# Patient Record
Sex: MALE | Race: WHITE | NOT HISPANIC OR LATINO | Employment: OTHER | URBAN - METROPOLITAN AREA
[De-identification: names, ages, dates, MRNs, and addresses within clinical notes are randomized per-mention and may not be internally consistent; named-entity substitution may affect disease eponyms.]

---

## 2017-03-08 ENCOUNTER — HOSPITAL ENCOUNTER (OUTPATIENT)
Dept: LAB | Facility: HOSPITAL | Age: 72
Setting detail: SPECIMEN
Discharge: HOME | End: 2017-03-08
Attending: INTERNAL MEDICINE | Admitting: INTERNAL MEDICINE

## 2017-09-05 ENCOUNTER — HOSPITAL ENCOUNTER (OUTPATIENT)
Dept: LAB | Facility: HOSPITAL | Age: 72
Setting detail: SPECIMEN
Discharge: HOME | End: 2017-09-05
Attending: INTERNAL MEDICINE | Admitting: INTERNAL MEDICINE

## 2017-12-29 ENCOUNTER — HOSPITAL ENCOUNTER (OUTPATIENT)
Dept: CARDIOLOGY | Facility: HOSPITAL | Age: 72
Discharge: HOME | End: 2017-12-29
Attending: INTERNAL MEDICINE | Admitting: INTERNAL MEDICINE

## 2018-03-08 ENCOUNTER — OFFICE (AMBULATORY)
Dept: URBAN - METROPOLITAN AREA CLINIC 64 | Facility: CLINIC | Age: 73
End: 2018-03-08

## 2018-03-08 VITALS
HEIGHT: 70 IN | HEART RATE: 60 BPM | DIASTOLIC BLOOD PRESSURE: 88 MMHG | SYSTOLIC BLOOD PRESSURE: 207 MMHG | WEIGHT: 210 LBS

## 2018-03-08 DIAGNOSIS — Z86.010 PERSONAL HISTORY OF COLONIC POLYPS: ICD-10-CM

## 2018-03-08 PROCEDURE — 99202 OFFICE O/P NEW SF 15 MIN: CPT | Performed by: INTERNAL MEDICINE

## 2018-04-11 ENCOUNTER — OFFICE (AMBULATORY)
Dept: URBAN - METROPOLITAN AREA PATHOLOGY 4 | Facility: PATHOLOGY | Age: 73
End: 2018-04-11
Payer: COMMERCIAL

## 2018-04-11 ENCOUNTER — ON CAMPUS - OUTPATIENT (AMBULATORY)
Dept: URBAN - METROPOLITAN AREA HOSPITAL 2 | Facility: HOSPITAL | Age: 73
End: 2018-04-11

## 2018-04-11 ENCOUNTER — HOSPITAL ENCOUNTER (OUTPATIENT)
Dept: OTHER | Facility: HOSPITAL | Age: 73
Setting detail: SPECIMEN
Discharge: HOME | End: 2018-04-11
Attending: INTERNAL MEDICINE | Admitting: INTERNAL MEDICINE

## 2018-04-11 VITALS
SYSTOLIC BLOOD PRESSURE: 93 MMHG | WEIGHT: 204 LBS | OXYGEN SATURATION: 99 % | OXYGEN SATURATION: 100 % | HEART RATE: 69 BPM | HEART RATE: 66 BPM | DIASTOLIC BLOOD PRESSURE: 64 MMHG | RESPIRATION RATE: 12 BRPM | SYSTOLIC BLOOD PRESSURE: 106 MMHG | DIASTOLIC BLOOD PRESSURE: 71 MMHG | OXYGEN SATURATION: 94 % | DIASTOLIC BLOOD PRESSURE: 73 MMHG | RESPIRATION RATE: 20 BRPM | RESPIRATION RATE: 16 BRPM | DIASTOLIC BLOOD PRESSURE: 42 MMHG | HEIGHT: 69 IN | DIASTOLIC BLOOD PRESSURE: 56 MMHG | HEART RATE: 62 BPM | HEART RATE: 72 BPM | SYSTOLIC BLOOD PRESSURE: 108 MMHG | TEMPERATURE: 98 F | DIASTOLIC BLOOD PRESSURE: 48 MMHG | SYSTOLIC BLOOD PRESSURE: 131 MMHG | RESPIRATION RATE: 18 BRPM | HEART RATE: 61 BPM | DIASTOLIC BLOOD PRESSURE: 61 MMHG | OXYGEN SATURATION: 97 % | SYSTOLIC BLOOD PRESSURE: 155 MMHG | RESPIRATION RATE: 15 BRPM | OXYGEN SATURATION: 98 % | SYSTOLIC BLOOD PRESSURE: 154 MMHG | SYSTOLIC BLOOD PRESSURE: 120 MMHG

## 2018-04-11 DIAGNOSIS — K57.30 DIVERTICULOSIS OF LARGE INTESTINE WITHOUT PERFORATION OR ABS: ICD-10-CM

## 2018-04-11 DIAGNOSIS — D12.2 BENIGN NEOPLASM OF ASCENDING COLON: ICD-10-CM

## 2018-04-11 DIAGNOSIS — D12.3 BENIGN NEOPLASM OF TRANSVERSE COLON: ICD-10-CM

## 2018-04-11 DIAGNOSIS — K64.0 FIRST DEGREE HEMORRHOIDS: ICD-10-CM

## 2018-04-11 DIAGNOSIS — Z86.010 PERSONAL HISTORY OF COLONIC POLYPS: ICD-10-CM

## 2018-04-11 LAB
GI HISTOLOGY: A. UNSPECIFIED: (no result)
GI HISTOLOGY: PDF REPORT: (no result)

## 2018-04-11 PROCEDURE — 88305 TISSUE EXAM BY PATHOLOGIST: CPT | Mod: 26 | Performed by: INTERNAL MEDICINE

## 2018-04-11 PROCEDURE — 45385 COLONOSCOPY W/LESION REMOVAL: CPT | Mod: PT | Performed by: INTERNAL MEDICINE

## 2018-04-11 RX ADMIN — PROPOFOL: 10 INJECTION, EMULSION INTRAVENOUS at 10:25

## 2019-01-01 ENCOUNTER — OFFICE VISIT (OUTPATIENT)
Dept: CARDIOLOGY | Facility: CLINIC | Age: 74
End: 2019-01-01

## 2019-01-01 ENCOUNTER — HOSPITAL ENCOUNTER (OUTPATIENT)
Dept: OTHER | Facility: HOSPITAL | Age: 74
Discharge: HOME | End: 2019-08-05

## 2019-01-01 ENCOUNTER — HOSPITAL ENCOUNTER (OUTPATIENT)
Dept: INTERVENTIONAL RADIOLOGY/VASCULAR | Facility: HOSPITAL | Age: 74
Discharge: HOME | End: 2019-08-21
Admitting: RADIOLOGY

## 2019-01-01 ENCOUNTER — TRANSCRIBE ORDERS (OUTPATIENT)
Dept: ADMINISTRATIVE | Facility: HOSPITAL | Age: 74
End: 2019-01-01

## 2019-01-01 VITALS
SYSTOLIC BLOOD PRESSURE: 170 MMHG | HEIGHT: 69 IN | DIASTOLIC BLOOD PRESSURE: 70 MMHG | BODY MASS INDEX: 31.4 KG/M2 | OXYGEN SATURATION: 97 % | HEART RATE: 62 BPM | WEIGHT: 212 LBS

## 2019-01-01 VITALS
BODY MASS INDEX: 30.27 KG/M2 | DIASTOLIC BLOOD PRESSURE: 64 MMHG | WEIGHT: 204.37 LBS | HEIGHT: 69 IN | HEART RATE: 63 BPM | OXYGEN SATURATION: 96 % | SYSTOLIC BLOOD PRESSURE: 163 MMHG | RESPIRATION RATE: 13 BRPM | TEMPERATURE: 98.2 F

## 2019-01-01 DIAGNOSIS — I73.9 PVD (PERIPHERAL VASCULAR DISEASE) WITH CLAUDICATION (HCC): Primary | ICD-10-CM

## 2019-01-01 DIAGNOSIS — I35.0 NONRHEUMATIC AORTIC VALVE STENOSIS: ICD-10-CM

## 2019-01-01 DIAGNOSIS — Z00.6 EXAMINATION FOR NORMAL COMPARISON OR CONTROL IN CLINICAL RESEARCH: ICD-10-CM

## 2019-01-01 DIAGNOSIS — E78.5 DYSLIPIDEMIA: ICD-10-CM

## 2019-01-01 DIAGNOSIS — I70.213 ATHEROSCLER OF NATIVE ARTERY OF BOTH LEGS WITH INTERMIT CLAUDICATION (HCC): ICD-10-CM

## 2019-01-01 DIAGNOSIS — I70.212 ATHEROSCLEROSIS OF NATIVE ARTERY OF LEFT LOWER EXTREMITY WITH INTERMITTENT CLAUDICATION (HCC): ICD-10-CM

## 2019-01-01 DIAGNOSIS — Z95.1 HX OF CABG: Primary | ICD-10-CM

## 2019-01-01 DIAGNOSIS — I10 ESSENTIAL HYPERTENSION: ICD-10-CM

## 2019-01-01 LAB
ANION GAP SERPL CALCULATED.3IONS-SCNC: 18.1 MMOL/L (ref 5–15)
APTT PPP: 25.2 SECONDS (ref 24–31)
BASOPHILS # BLD AUTO: 0 10*3/MM3 (ref 0–0.2)
BASOPHILS NFR BLD AUTO: 0.4 % (ref 0–1.5)
BUN BLD-MCNC: 12 MG/DL (ref 8–20)
BUN/CREAT SERPL: 10 (ref 6.2–20.3)
CALCIUM SPEC-SCNC: 9.1 MG/DL (ref 8.9–10.3)
CHLORIDE SERPL-SCNC: 107 MMOL/L (ref 101–111)
CO2 SERPL-SCNC: 19 MMOL/L (ref 22–32)
CREAT BLD-MCNC: 1.2 MG/DL (ref 0.7–1.2)
DEPRECATED RDW RBC AUTO: 45.1 FL (ref 37–54)
DEPRECATED RDW RBC AUTO: 45.9 FL (ref 37–54)
EOSINOPHIL # BLD AUTO: 0.1 10*3/MM3 (ref 0–0.4)
EOSINOPHIL NFR BLD AUTO: 1.2 % (ref 0.3–6.2)
ERYTHROCYTE [DISTWIDTH] IN BLOOD BY AUTOMATED COUNT: 15.5 % (ref 12.3–15.4)
ERYTHROCYTE [DISTWIDTH] IN BLOOD BY AUTOMATED COUNT: 15.8 % (ref 12.3–15.4)
GFR SERPL CREATININE-BSD FRML MDRD: 60 ML/MIN/1.73
GLUCOSE BLD-MCNC: 202 MG/DL (ref 65–99)
HCT VFR BLD AUTO: 37.2 % (ref 37.5–51)
HCT VFR BLD AUTO: 37.3 % (ref 37.5–51)
HGB BLD-MCNC: 12.1 G/DL (ref 13–17.7)
HGB BLD-MCNC: 12.1 G/DL (ref 13–17.7)
INR PPP: 1.04 (ref 0.9–1.1)
LYMPHOCYTES # BLD AUTO: 2 10*3/MM3 (ref 0.7–3.1)
LYMPHOCYTES NFR BLD AUTO: 24.1 % (ref 19.6–45.3)
MCH RBC QN AUTO: 26.7 PG (ref 26.6–33)
MCH RBC QN AUTO: 26.9 PG (ref 26.6–33)
MCHC RBC AUTO-ENTMCNC: 32.5 G/DL (ref 31.5–35.7)
MCHC RBC AUTO-ENTMCNC: 32.6 G/DL (ref 31.5–35.7)
MCV RBC AUTO: 82.2 FL (ref 79–97)
MCV RBC AUTO: 82.6 FL (ref 79–97)
MONOCYTES # BLD AUTO: 0.8 10*3/MM3 (ref 0.1–0.9)
MONOCYTES NFR BLD AUTO: 9.4 % (ref 5–12)
NEUTROPHILS # BLD AUTO: 5.4 10*3/MM3 (ref 1.7–7)
NEUTROPHILS NFR BLD AUTO: 64.9 % (ref 42.7–76)
NRBC BLD AUTO-RTO: 0.1 /100 WBC (ref 0–0.2)
PLATELET # BLD AUTO: 270 10*3/MM3 (ref 140–450)
PLATELET # BLD AUTO: 282 10*3/MM3 (ref 140–450)
PMV BLD AUTO: 8.4 FL (ref 6–12)
PMV BLD AUTO: 9.1 FL (ref 6–12)
POTASSIUM BLD-SCNC: 4.1 MMOL/L (ref 3.6–5.1)
PROTHROMBIN TIME: 10.6 SECONDS (ref 9.6–11.7)
RBC # BLD AUTO: 4.5 10*6/MM3 (ref 4.14–5.8)
RBC # BLD AUTO: 4.54 10*6/MM3 (ref 4.14–5.8)
SODIUM BLD-SCNC: 140 MMOL/L (ref 136–144)
WBC NRBC COR # BLD: 8.3 10*3/MM3 (ref 3.4–10.8)
WBC NRBC COR # BLD: 8.4 10*3/MM3 (ref 3.4–10.8)

## 2019-01-01 PROCEDURE — 25010000002 ONDANSETRON PER 1 MG: Performed by: RADIOLOGY

## 2019-01-01 PROCEDURE — C1725 CATH, TRANSLUMIN NON-LASER: HCPCS

## 2019-01-01 PROCEDURE — C1894 INTRO/SHEATH, NON-LASER: HCPCS

## 2019-01-01 PROCEDURE — C1769 GUIDE WIRE: HCPCS

## 2019-01-01 PROCEDURE — 75630 X-RAY AORTA LEG ARTERIES: CPT

## 2019-01-01 PROCEDURE — 25010000002 HEPARIN (PORCINE) PER 1000 UNITS: Performed by: RADIOLOGY

## 2019-01-01 PROCEDURE — 85025 COMPLETE CBC W/AUTO DIFF WBC: CPT | Performed by: RADIOLOGY

## 2019-01-01 PROCEDURE — 80048 BASIC METABOLIC PNL TOTAL CA: CPT | Performed by: RADIOLOGY

## 2019-01-01 PROCEDURE — 85730 THROMBOPLASTIN TIME PARTIAL: CPT | Performed by: RADIOLOGY

## 2019-01-01 PROCEDURE — 25010000002 IOPAMIDOL 61 % SOLUTION: Performed by: THORACIC SURGERY (CARDIOTHORACIC VASCULAR SURGERY)

## 2019-01-01 PROCEDURE — C1760 CLOSURE DEV, VASC: HCPCS

## 2019-01-01 PROCEDURE — 25010000002 MIDAZOLAM PER 1 MG: Performed by: RADIOLOGY

## 2019-01-01 PROCEDURE — 99153 MOD SED SAME PHYS/QHP EA: CPT

## 2019-01-01 PROCEDURE — 93000 ELECTROCARDIOGRAM COMPLETE: CPT | Performed by: INTERNAL MEDICINE

## 2019-01-01 PROCEDURE — 99152 MOD SED SAME PHYS/QHP 5/>YRS: CPT

## 2019-01-01 PROCEDURE — C1876 STENT, NON-COA/NON-COV W/DEL: HCPCS

## 2019-01-01 PROCEDURE — 25010000002 FENTANYL CITRATE (PF) 100 MCG/2ML SOLUTION: Performed by: RADIOLOGY

## 2019-01-01 PROCEDURE — 85610 PROTHROMBIN TIME: CPT | Performed by: RADIOLOGY

## 2019-01-01 PROCEDURE — C1887 CATHETER, GUIDING: HCPCS

## 2019-01-01 PROCEDURE — 85027 COMPLETE CBC AUTOMATED: CPT | Performed by: RADIOLOGY

## 2019-01-01 PROCEDURE — 99214 OFFICE O/P EST MOD 30 MIN: CPT | Performed by: INTERNAL MEDICINE

## 2019-01-01 RX ORDER — METFORMIN HYDROCHLORIDE 500 MG/1
TABLET, EXTENDED RELEASE ORAL
Qty: 360 TABLET | Refills: 0 | OUTPATIENT
Start: 2019-01-01

## 2019-01-01 RX ORDER — AMLODIPINE BESYLATE 5 MG/1
TABLET ORAL EVERY 24 HOURS
COMMUNITY
Start: 2018-06-27 | End: 2019-01-01

## 2019-01-01 RX ORDER — HEPARIN SODIUM 1000 [USP'U]/ML
INJECTION, SOLUTION INTRAVENOUS; SUBCUTANEOUS
Status: COMPLETED | OUTPATIENT
Start: 2019-01-01 | End: 2019-01-01

## 2019-01-01 RX ORDER — NIACIN 500 MG
TABLET ORAL EVERY 24 HOURS
COMMUNITY
Start: 2015-08-24 | End: 2020-01-01 | Stop reason: SDUPTHER

## 2019-01-01 RX ORDER — LOSARTAN POTASSIUM 100 MG/1
100 TABLET ORAL DAILY
COMMUNITY
Start: 2015-08-24

## 2019-01-01 RX ORDER — ATORVASTATIN CALCIUM 40 MG/1
TABLET, FILM COATED ORAL
Qty: 90 TABLET | OUTPATIENT
Start: 2019-01-01

## 2019-01-01 RX ORDER — OFLOXACIN 3 MG/ML
SOLUTION/ DROPS OPHTHALMIC
Refills: 0 | COMMUNITY
Start: 2019-01-01 | End: 2020-01-01

## 2019-01-01 RX ORDER — FINASTERIDE 5 MG/1
5 TABLET, FILM COATED ORAL NIGHTLY
COMMUNITY
Start: 2015-08-24

## 2019-01-01 RX ORDER — ASPIRIN 81 MG/1
81 TABLET ORAL 2 TIMES DAILY
COMMUNITY
Start: 2015-08-24 | End: 2020-01-01 | Stop reason: HOSPADM

## 2019-01-01 RX ORDER — NITROGLYCERIN 0.4 MG/1
TABLET SUBLINGUAL
Qty: 25 TABLET | Refills: 0 | Status: SHIPPED | OUTPATIENT
Start: 2019-01-01 | End: 2019-01-01 | Stop reason: SDUPTHER

## 2019-01-01 RX ORDER — GLIMEPIRIDE 2 MG/1
TABLET ORAL
Qty: 360 TABLET | Refills: 0 | OUTPATIENT
Start: 2019-01-01

## 2019-01-01 RX ORDER — ESOMEPRAZOLE MAGNESIUM 40 MG/1
CAPSULE, DELAYED RELEASE ORAL
COMMUNITY
Start: 2015-08-24 | End: 2020-01-01 | Stop reason: ALTCHOICE

## 2019-01-01 RX ORDER — SODIUM CHLORIDE 0.9 % (FLUSH) 0.9 %
3-10 SYRINGE (ML) INJECTION AS NEEDED
Status: DISCONTINUED | OUTPATIENT
Start: 2019-01-01 | End: 2019-01-01 | Stop reason: HOSPADM

## 2019-01-01 RX ORDER — ATENOLOL 25 MG/1
TABLET ORAL
Qty: 180 TABLET | Refills: 1 | Status: SHIPPED | OUTPATIENT
Start: 2019-01-01 | End: 2020-01-01

## 2019-01-01 RX ORDER — MIDAZOLAM HYDROCHLORIDE 1 MG/ML
INJECTION INTRAMUSCULAR; INTRAVENOUS
Status: COMPLETED | OUTPATIENT
Start: 2019-01-01 | End: 2019-01-01

## 2019-01-01 RX ORDER — GLIMEPIRIDE 2 MG/1
4 TABLET ORAL 2 TIMES DAILY
COMMUNITY
Start: 2018-02-13

## 2019-01-01 RX ORDER — CLOPIDOGREL BISULFATE 75 MG/1
TABLET ORAL EVERY 24 HOURS
COMMUNITY
Start: 2018-03-01 | End: 2019-01-01 | Stop reason: SDUPTHER

## 2019-01-01 RX ORDER — ATORVASTATIN CALCIUM 40 MG/1
TABLET, FILM COATED ORAL
Qty: 30 TABLET | Refills: 0 | Status: SHIPPED | OUTPATIENT
Start: 2019-01-01 | End: 2020-01-01 | Stop reason: ALTCHOICE

## 2019-01-01 RX ORDER — METFORMIN HYDROCHLORIDE 500 MG/1
2000 TABLET, EXTENDED RELEASE ORAL
COMMUNITY
Start: 2018-09-13

## 2019-01-01 RX ORDER — CLOPIDOGREL BISULFATE 75 MG/1
TABLET ORAL
Qty: 90 TABLET | Refills: 1 | Status: SHIPPED | OUTPATIENT
Start: 2019-01-01 | End: 2020-01-01

## 2019-01-01 RX ORDER — MINOXIDIL 2 %
1 SOLUTION, NON-ORAL TOPICAL 2 TIMES DAILY
COMMUNITY
Start: 2015-08-24 | End: 2020-01-01 | Stop reason: HOSPADM

## 2019-01-01 RX ORDER — NITROGLYCERIN 0.4 MG/1
TABLET SUBLINGUAL
COMMUNITY
Start: 2018-07-02 | End: 2019-01-01 | Stop reason: SDUPTHER

## 2019-01-01 RX ORDER — AMLODIPINE BESYLATE 10 MG/1
10 TABLET ORAL DAILY
Refills: 3 | COMMUNITY
Start: 2019-05-11

## 2019-01-01 RX ORDER — ISOSORBIDE MONONITRATE 60 MG/1
TABLET, EXTENDED RELEASE ORAL EVERY 24 HOURS
COMMUNITY
Start: 2017-09-28 | End: 2019-01-01

## 2019-01-01 RX ORDER — ONDANSETRON 2 MG/ML
INJECTION INTRAMUSCULAR; INTRAVENOUS
Status: COMPLETED | OUTPATIENT
Start: 2019-01-01 | End: 2019-01-01

## 2019-01-01 RX ORDER — SODIUM CHLORIDE 0.9 % (FLUSH) 0.9 %
3 SYRINGE (ML) INJECTION EVERY 12 HOURS SCHEDULED
Status: DISCONTINUED | OUTPATIENT
Start: 2019-01-01 | End: 2019-01-01 | Stop reason: HOSPADM

## 2019-01-01 RX ORDER — ISOSORBIDE MONONITRATE 60 MG/1
TABLET, EXTENDED RELEASE ORAL
Qty: 90 TABLET | Refills: 0 | Status: SHIPPED | OUTPATIENT
Start: 2019-01-01 | End: 2020-01-01

## 2019-01-01 RX ORDER — ATORVASTATIN CALCIUM 40 MG/1
TABLET, FILM COATED ORAL
COMMUNITY
Start: 2018-07-09 | End: 2019-01-01

## 2019-01-01 RX ORDER — FENTANYL CITRATE 50 UG/ML
INJECTION, SOLUTION INTRAMUSCULAR; INTRAVENOUS
Status: COMPLETED | OUTPATIENT
Start: 2019-01-01 | End: 2019-01-01

## 2019-01-01 RX ORDER — ATENOLOL 25 MG/1
TABLET ORAL EVERY 12 HOURS
COMMUNITY
Start: 2017-11-16 | End: 2019-01-01 | Stop reason: SDUPTHER

## 2019-01-01 RX ORDER — NITROGLYCERIN 0.4 MG/1
TABLET SUBLINGUAL
Qty: 25 TABLET | Refills: 0 | Status: SHIPPED | OUTPATIENT
Start: 2019-01-01 | End: 2020-01-01

## 2019-01-01 RX ADMIN — IOPAMIDOL 11 ML: 612 INJECTION, SOLUTION INTRAVENOUS at 11:22

## 2019-01-01 RX ADMIN — HEPARIN SODIUM 5000 UNITS: 1000 INJECTION, SOLUTION INTRAVENOUS; SUBCUTANEOUS at 09:38

## 2019-01-01 RX ADMIN — FENTANYL CITRATE 100 MCG: 50 INJECTION, SOLUTION INTRAMUSCULAR; INTRAVENOUS at 09:15

## 2019-01-01 RX ADMIN — ONDANSETRON 4 MG: 2 INJECTION INTRAMUSCULAR; INTRAVENOUS at 08:55

## 2019-01-01 RX ADMIN — FENTANYL CITRATE 50 MCG: 50 INJECTION, SOLUTION INTRAMUSCULAR; INTRAVENOUS at 09:27

## 2019-01-01 RX ADMIN — MIDAZOLAM 1 MG: 1 INJECTION INTRAMUSCULAR; INTRAVENOUS at 09:15

## 2019-01-01 RX ADMIN — CEFAZOLIN SODIUM 2 G: 1 INJECTION, POWDER, FOR SOLUTION INTRAMUSCULAR; INTRAVENOUS at 09:01

## 2019-01-01 RX ADMIN — IOPAMIDOL 100 ML: 612 INJECTION, SOLUTION INTRAVENOUS at 11:22

## 2019-01-01 RX ADMIN — MIDAZOLAM 0.5 MG: 1 INJECTION INTRAMUSCULAR; INTRAVENOUS at 09:27

## 2019-06-21 PROBLEM — R01.1 SYSTOLIC MURMUR: Status: ACTIVE | Noted: 2017-06-26

## 2019-06-21 PROBLEM — E78.5 HYPERLIPIDEMIA: Status: ACTIVE | Noted: 2019-01-01

## 2019-06-21 PROBLEM — I21.3 ST ELEVATION (STEMI) MYOCARDIAL INFARCTION (HCC): Status: ACTIVE | Noted: 2019-01-01

## 2019-06-21 PROBLEM — I25.10 CORONARY ARTERY DISEASE: Status: ACTIVE | Noted: 2019-01-01

## 2019-07-01 NOTE — PROGRESS NOTES
Encounter Date:07/01/2019  6 months follow-up    Patient ID: Clemente Salinas is a 73 y.o. male.    Chief Complaint:  Status post CABG  Systolic murmur.  Status post stent placement  Hypertension  Diabetes  Dyslipidemia      History of Present Illness  Patient is having tightness with walking concerning for intermittent claudication.    Since I have last seen, the patient has been without any chest discomfort ,shortness of breath, palpitations, dizziness or syncope.  Denies having any headache ,abdominal pain ,nausea, vomiting , diarrhea constipation, loss of weight or loss of appetite.  Denies having any excessive bruising ,hematuria or blood in the stool.    Review of all systems negative except as indicated    Assessment and Plan       [[[[[[[[[[[[[[[[[  Impression  =============   - status post CABG 07/2001.    Status post stent to SVG to lateral marginal branch and LAD 09/09/2013.  Status post stent to SVG to RCA 06/18/2013 after subendocardial myocardial infarction. lima to LAD was patent.  SVG to 2nd marginal branch and RCA were patent.  Normal left   ventricular function.     -  exertional chest discomfort -improved.    Negative stress Cardiolite test 07/05/2016.     -systolic murmur -echocardiogram will be reviewed.  Patient has gradient of 17 mm of mercury across aortic valve.  12/29/2017    Possible left lower extremity claudication-intermittent       -diabetes dyslipidemia and hypertension -improved      - status post cholecystectomy      -past history of smoking      -history of BPH      -family history of coronary artery disease.  ============    Plan  ============  EKG showed sinus bradycardia nonspecific ST-T wave changes 54/min  Patient is having left lower extremity claudication.  Patient has an appointment with vascular surgeon.   patient is not having any angina pectoris or congestive heart failure  Medications were reviewed and updated.  Followup in 6 months  ]]]]]]]]]]]]]]]]]]]]]]]]             Diagnosis Plan   1. Hx of CABG     2. Dyslipidemia     3. Essential hypertension     4. Atherosclerosis of native artery of left lower extremity with intermittent claudication (CMS/HCC)     5. Nonrheumatic aortic valve stenosis     LAB RESULTS (LAST 7 DAYS)    CBC        BMP        CMP         BNP        TROPONIN        CoAg        Creatinine Clearance  CrCl cannot be calculated (No order found.).    ABG        Radiology  No radiology results for the last day                The following portions of the patient's history were reviewed and updated as appropriate: allergies, current medications, past family history, past medical history, past social history, past surgical history and problem list.    Review of Systems   Constitution: Negative for weight gain and weight loss.   Cardiovascular: Negative for chest pain, leg swelling, palpitations and syncope.   Respiratory: Negative for shortness of breath.    Skin: Negative for rash.   Gastrointestinal: Negative for nausea and vomiting.   Neurological: Negative for dizziness, light-headedness and numbness.         Current Outpatient Medications:   •  amLODIPine (NORVASC) 10 MG tablet, Take  by mouth Daily., Disp: , Rfl: 3  •  aspirin (ADULT ASPIRIN EC LOW STRENGTH) 81 MG EC tablet, ADULT ASPIRIN EC LOW STRENGTH 81 MG ORAL TABLET DELAYED RELEASE, Disp: , Rfl:   •  atenolol (TENORMIN) 25 MG tablet, Every 12 (Twelve) Hours., Disp: , Rfl:   •  atorvastatin (LIPITOR) 40 MG tablet, ATORVASTATIN CALCIUM 40 MG TABS, Disp: , Rfl:   •  Cholecalciferol ([employer]S VITAMIN D3) 1000 units chewable tablet, Daily., Disp: , Rfl:   •  clopidogrel (PLAVIX) 75 MG tablet, Daily., Disp: , Rfl:   •  esomeprazole (NEXIUM) 40 MG capsule, NEXIUM 40 MG CPDR, Disp: , Rfl:   •  finasteride (PROSCAR) 5 MG tablet, FINASTERIDE 5 MG TABS, Disp: , Rfl:   •  glimepiride (AMARYL) 2 MG tablet, GLIMEPIRIDE 2 MG TABS, Disp: , Rfl:   •  glucose blood (ONETOUCH VERIO) test strip, ONETOUCH VERIO STRP, Disp: , Rfl:    •  isosorbide mononitrate (IMDUR) 60 MG 24 hr tablet, Daily., Disp: , Rfl:   •  losartan (COZAAR) 100 MG tablet, LOSARTAN POTASSIUM 50 MG TABS, Disp: , Rfl:   •  metFORMIN ER (GLUCOPHAGE-XR) 500 MG 24 hr tablet, 4 (Four) Times a Day., Disp: , Rfl:   •  niacin 500 MG tablet, Daily., Disp: , Rfl:   •  nitroglycerin (NITROSTAT) 0.4 MG SL tablet, NITROSTAT 0.4 MG SUBL, Disp: , Rfl:   •  ofloxacin (OCUFLOX) 0.3 % ophthalmic solution, , Disp: , Rfl: 0  •  Omega-3 Fatty Acids (CVS FISH OIL) 1200 MG capsule, CVS FISH OIL 1200 MG CAPS, Disp: , Rfl:     No Known Allergies    Family History   Problem Relation Age of Onset   • Heart disease Father    • Heart attack Father 62   • Heart attack Paternal Grandmother 72   • Heart disease Paternal Grandmother        Past Surgical History:   Procedure Laterality Date   • CHOLECYSTECTOMY     • CORONARY ANGIOPLASTY WITH STENT PLACEMENT  09/09/2013    LAD   • CORONARY ARTERY BYPASS GRAFT  07/2001   • CORONARY STENT PLACEMENT  06/18/2013       Past Medical History:   Diagnosis Date   • BPH (benign prostatic hyperplasia)    • CAD (coronary artery disease)    • DM type 2 (diabetes mellitus, type 2) (CMS/[facility]) 2001   • HLD (hyperlipidemia)    • HTN (hypertension) 2003   • Myocardial infarction (CMS/[facility])    • Peripheral neuropathy    • Vitamin D deficiency 12/2010       Family History   Problem Relation Age of Onset   • Heart disease Father    • Heart attack Father 62   • Heart attack Paternal Grandmother 72   • Heart disease Paternal Grandmother        Social History     Socioeconomic History   • Marital status:      Spouse name: Not on file   • Number of children: Not on file   • Years of education: Not on file   • Highest education level: Not on file   Tobacco Use   • Smoking status: Former Smoker   Substance and Sexual Activity   • Alcohol use: No   • Drug use: No           ECG 12 Lead  Date/Time: 7/1/2019 10:45 AM  Performed by: Pj Hough MD  Authorized by: [place]  "MD Pj   Previous ECG: no previous ECG available  Comments: Sinus bradycardia 54/min nonspecific ST-T wave changes normal axis normal intervals no ectopy              Objective:       Physical Exam    /70 (BP Location: Left arm, Patient Position: Sitting, Cuff Size: Large Adult)   Pulse 62   Ht 175.3 cm (69\")   Wt 96.2 kg (212 lb)   SpO2 97%   BMI 31.31 kg/m²   The patient is alert, oriented and in no distress.    Vital signs as noted above.    Head and neck revealed no carotid bruits or jugular venous distension.  No thyromegaly or lymphadenopathy is present.    Lungs clear.  No wheezing.  Breath sounds are normal bilaterally.    Heart normal first and second heart sounds.  Grade 2/6 systolic murmur.  No pericardial rub is present.  No gallop is present.    Abdomen soft and nontender.  No organomegaly is present.    Extremities revealed decreased lower extremity pulses.  No pedal edema.  Skin warm and dry.    Musculoskeletal system is grossly normal.    CNS grossly normal.        "

## 2019-08-21 PROBLEM — I70.213 ATHEROSCLER OF NATIVE ARTERY OF BOTH LEGS WITH INTERMIT CLAUDICATION (HCC): Status: ACTIVE | Noted: 2019-01-01

## 2019-08-21 NOTE — NURSING NOTE
Dr. Patterson deployed stent in left common iliac artery. Angioplasty within stent taking place. TrialBee stent 10mm x 37mm x 75cm, Lot # [id number].

## 2019-08-21 NOTE — NURSING NOTE
Dr. Patterson is performing angioplasty using a [id number] balloon, in patient's right superficial femoral artery.

## 2019-08-21 NOTE — DISCHARGE INSTRUCTIONS
A responsible adult should stay with you and you should rest quietly for the rest of the day. Do not drink alcohol, drive or cook for 24 hours following your procedure.  Progress your diet as tolerated.  Resume your usually medications including aspirin.  When you remove your dressing in 48 hours, a small amount of blood is to be expected. Do not be alarmed.  If your legs become painful, numb, swollen or is bleeding excessively apply pressure and proceed to the Emergency room. Have someone wake you up during the first night home to check on you. Do not shower, bath, or get your dressing wet at all for 48 hours.  You may shower after the dressing is removed. No lifting more that 10 pounds for 48 hours.  If severe pain, increased shortness of air or racing heartbeat occur, seek immediate medical attention.  Follow up with Dr. Blandon with further questions.

## 2019-08-21 NOTE — NURSING NOTE
Local dressing of 4x4, betadine, and tegaderm applied to RCFA. Hemostasis achieved to LCFA. Small hematoma noted to LCFA, but area is soft per S. RT StanfordR. Local pressure dressing of 4x4, betadine, and tegaderm applied to LCFA.

## 2019-08-21 NOTE — NURSING NOTE
Dr. Patterson deployed stent in pt's right superficial femoral artery and angioplasty within stent is taking place. [facility] 6mm x 37mm x 75cm, lot # [id number].

## 2019-08-21 NOTE — NURSING NOTE
Pt transferred back to post-op via stretcher and on room air. Pt. Is awake but drowsy and oriented x3. Pt. Has dressings dry and intact to right and left groins. Pt. Placed back on 2L oxygen via N/C in post-op due to dropping to 88% at times being drowsy.

## 2019-08-21 NOTE — NURSING NOTE
Pt. Brought into IR procedure room and moved self from stretcher to IR exam table in supine position. Pt. Provided warm blankets for comfort. Pt. Remains on heart, blood pressure, and oxygen sat monitoring. Pt. Placed on 2L oxygen via N/C for procedure. Pt denies any pain prior to start of procedure.

## 2019-08-21 NOTE — NURSING NOTE
Pt. Right and left groin prepped in sterile fashion using chlorhexidine scrub and then draped on sterile fashion.

## 2020-01-01 ENCOUNTER — APPOINTMENT (OUTPATIENT)
Dept: MRI IMAGING | Facility: HOSPITAL | Age: 75
End: 2020-01-01

## 2020-01-01 ENCOUNTER — APPOINTMENT (OUTPATIENT)
Dept: GENERAL RADIOLOGY | Facility: HOSPITAL | Age: 75
End: 2020-01-01

## 2020-01-01 ENCOUNTER — HOSPITAL ENCOUNTER (INPATIENT)
Facility: HOSPITAL | Age: 75
LOS: 4 days | Discharge: HOME HEALTH | End: 2020-05-11
Attending: NEUROLOGICAL SURGERY | Admitting: NEUROLOGICAL SURGERY

## 2020-01-01 ENCOUNTER — READMISSION MANAGEMENT (OUTPATIENT)
Dept: CALL CENTER | Facility: HOSPITAL | Age: 75
End: 2020-01-01

## 2020-01-01 ENCOUNTER — ANESTHESIA (OUTPATIENT)
Dept: GASTROENTEROLOGY | Facility: HOSPITAL | Age: 75
End: 2020-01-01

## 2020-01-01 ENCOUNTER — TELEPHONE (OUTPATIENT)
Dept: NEUROSURGERY | Facility: CLINIC | Age: 75
End: 2020-01-01

## 2020-01-01 ENCOUNTER — OFFICE VISIT (OUTPATIENT)
Dept: CARDIOLOGY | Facility: CLINIC | Age: 75
End: 2020-01-01

## 2020-01-01 ENCOUNTER — APPOINTMENT (OUTPATIENT)
Dept: CT IMAGING | Facility: HOSPITAL | Age: 75
End: 2020-01-01

## 2020-01-01 ENCOUNTER — HOSPITAL ENCOUNTER (OUTPATIENT)
Dept: RADIATION ONCOLOGY | Facility: HOSPITAL | Age: 75
Setting detail: RADIATION/ONCOLOGY SERIES
End: 2020-01-01

## 2020-01-01 ENCOUNTER — ANESTHESIA (OUTPATIENT)
Dept: MEDSURG UNIT | Facility: HOSPITAL | Age: 75
End: 2020-01-01

## 2020-01-01 ENCOUNTER — ANESTHESIA EVENT (OUTPATIENT)
Dept: GASTROENTEROLOGY | Facility: HOSPITAL | Age: 75
End: 2020-01-01

## 2020-01-01 ENCOUNTER — OFFICE VISIT (OUTPATIENT)
Dept: NEUROSURGERY | Facility: CLINIC | Age: 75
End: 2020-01-01

## 2020-01-01 ENCOUNTER — ANESTHESIA EVENT (OUTPATIENT)
Dept: MEDSURG UNIT | Facility: HOSPITAL | Age: 75
End: 2020-01-01

## 2020-01-01 ENCOUNTER — HOSPITAL ENCOUNTER (INPATIENT)
Facility: HOSPITAL | Age: 75
LOS: 5 days | Discharge: HOME | End: 2020-05-05
Attending: FAMILY MEDICINE | Admitting: FAMILY MEDICINE

## 2020-01-01 ENCOUNTER — ANESTHESIA EVENT (OUTPATIENT)
Dept: PERIOP | Facility: HOSPITAL | Age: 75
End: 2020-01-01

## 2020-01-01 ENCOUNTER — HOSPITAL ENCOUNTER (EMERGENCY)
Facility: HOSPITAL | Age: 75
End: 2020-05-25
Attending: EMERGENCY MEDICINE | Admitting: EMERGENCY MEDICINE

## 2020-01-01 ENCOUNTER — ANESTHESIA (OUTPATIENT)
Dept: PERIOP | Facility: HOSPITAL | Age: 75
End: 2020-01-01

## 2020-01-01 VITALS
SYSTOLIC BLOOD PRESSURE: 111 MMHG | DIASTOLIC BLOOD PRESSURE: 59 MMHG | OXYGEN SATURATION: 96 % | BODY MASS INDEX: 26.87 KG/M2 | WEIGHT: 181.44 LBS | RESPIRATION RATE: 14 BRPM | HEIGHT: 69 IN | HEART RATE: 70 BPM | TEMPERATURE: 98.3 F

## 2020-01-01 VITALS — BODY MASS INDEX: 26.78 KG/M2 | WEIGHT: 181.44 LBS

## 2020-01-01 VITALS
WEIGHT: 209 LBS | OXYGEN SATURATION: 96 % | SYSTOLIC BLOOD PRESSURE: 170 MMHG | BODY MASS INDEX: 30.96 KG/M2 | DIASTOLIC BLOOD PRESSURE: 68 MMHG | HEIGHT: 69 IN | HEART RATE: 65 BPM

## 2020-01-01 VITALS
BODY MASS INDEX: 28.73 KG/M2 | SYSTOLIC BLOOD PRESSURE: 148 MMHG | HEIGHT: 69 IN | OXYGEN SATURATION: 95 % | WEIGHT: 194 LBS | DIASTOLIC BLOOD PRESSURE: 59 MMHG | HEART RATE: 50 BPM | RESPIRATION RATE: 16 BRPM | TEMPERATURE: 97.4 F

## 2020-01-01 DIAGNOSIS — C79.9 METASTATIC CANCER (HCC): ICD-10-CM

## 2020-01-01 DIAGNOSIS — I46.9 CARDIOPULMONARY ARREST (HCC): Primary | ICD-10-CM

## 2020-01-01 DIAGNOSIS — Z98.1 S/P CERVICAL SPINAL FUSION: Primary | ICD-10-CM

## 2020-01-01 DIAGNOSIS — E78.2 MIXED HYPERLIPIDEMIA: Primary | ICD-10-CM

## 2020-01-01 DIAGNOSIS — I70.212 ATHEROSCLEROSIS OF NATIVE ARTERY OF LEFT LOWER EXTREMITY WITH INTERMITTENT CLAUDICATION (HCC): ICD-10-CM

## 2020-01-01 DIAGNOSIS — Z98.61 STATUS POST PERCUTANEOUS TRANSLUMINAL CORONARY ANGIOPLASTY: ICD-10-CM

## 2020-01-01 DIAGNOSIS — Z98.890 S/P VERTEBROPLASTY: ICD-10-CM

## 2020-01-01 DIAGNOSIS — C34.31 MALIGNANT NEOPLASM OF LOWER LOBE OF RIGHT LUNG (HCC): ICD-10-CM

## 2020-01-01 DIAGNOSIS — Z95.1 STATUS POST CORONARY ARTERY BYPASS GRAFT: ICD-10-CM

## 2020-01-01 DIAGNOSIS — E78.2 MODERATE MIXED HYPERLIPIDEMIA NOT REQUIRING STATIN THERAPY: ICD-10-CM

## 2020-01-01 DIAGNOSIS — E11.49 TYPE 2 DIABETES MELLITUS WITH OTHER DIABETIC NEUROLOGICAL COMPLICATION (HCC): ICD-10-CM

## 2020-01-01 DIAGNOSIS — I10 ESSENTIAL HYPERTENSION: ICD-10-CM

## 2020-01-01 DIAGNOSIS — C34.91 MALIGNANT NEOPLASM OF RIGHT LUNG, UNSPECIFIED PART OF LUNG (HCC): Primary | ICD-10-CM

## 2020-01-01 LAB
ABO GROUP BLD: NORMAL
ALBUMIN SERPL-MCNC: 3.5 G/DL (ref 3.5–5.2)
ALBUMIN SERPL-MCNC: 3.5 G/DL (ref 3.5–5.2)
ALBUMIN SERPL-MCNC: 4.1 G/DL (ref 3.5–5.2)
ALBUMIN/GLOB SERPL: 1.2 G/DL
ALBUMIN/GLOB SERPL: 1.2 G/DL
ALBUMIN/GLOB SERPL: 1.3 G/DL
ALP SERPL-CCNC: 141 U/L (ref 39–117)
ALP SERPL-CCNC: 156 U/L (ref 39–117)
ALP SERPL-CCNC: 187 U/L (ref 39–117)
ALT SERPL W P-5'-P-CCNC: 10 U/L (ref 1–41)
ALT SERPL W P-5'-P-CCNC: 14 U/L (ref 1–41)
ALT SERPL W P-5'-P-CCNC: 15 U/L (ref 1–41)
ANION GAP SERPL CALCULATED.3IONS-SCNC: 11 MMOL/L (ref 5–15)
ANION GAP SERPL CALCULATED.3IONS-SCNC: 13 MMOL/L (ref 5–15)
ANION GAP SERPL CALCULATED.3IONS-SCNC: 14 MMOL/L (ref 5–15)
ANION GAP SERPL CALCULATED.3IONS-SCNC: 14 MMOL/L (ref 5–15)
APTT PPP: 21.3 SECONDS (ref 24–31)
AST SERPL-CCNC: 14 U/L (ref 1–40)
AST SERPL-CCNC: 17 U/L (ref 1–40)
AST SERPL-CCNC: 22 U/L (ref 1–40)
B PERT DNA SPEC QL NAA+PROBE: NOT DETECTED
B PERT DNA SPEC QL NAA+PROBE: NOT DETECTED
BACTERIA SPEC RESP CULT: NO GROWTH
BASE DEFICIT: ABNORMAL
BASE EXCESS BLDA CALC-SCNC: <0 MMOL/L (ref 0–3)
BASOPHILS # BLD AUTO: 0 10*3/MM3 (ref 0–0.2)
BASOPHILS NFR BLD AUTO: 0.1 % (ref 0–1.5)
BASOPHILS NFR BLD AUTO: 0.1 % (ref 0–1.5)
BASOPHILS NFR BLD AUTO: 0.3 % (ref 0–1.5)
BASOPHILS NFR BLD AUTO: 0.3 % (ref 0–1.5)
BH BB BLOOD EXPIRATION DATE: NORMAL
BH BB BLOOD EXPIRATION DATE: NORMAL
BH BB BLOOD TYPE BARCODE: 6200
BH BB BLOOD TYPE BARCODE: 6200
BH BB DISPENSE STATUS: NORMAL
BH BB DISPENSE STATUS: NORMAL
BH BB PRODUCT CODE: NORMAL
BH BB PRODUCT CODE: NORMAL
BH BB UNIT NUMBER: NORMAL
BH BB UNIT NUMBER: NORMAL
BILIRUB SERPL-MCNC: 0.4 MG/DL (ref 0.2–1.2)
BILIRUB SERPL-MCNC: 0.4 MG/DL (ref 0.2–1.2)
BILIRUB SERPL-MCNC: 0.5 MG/DL (ref 0.2–1.2)
BLD GP AB SCN SERPL QL: NEGATIVE
BUN BLD-MCNC: 14 MG/DL (ref 8–23)
BUN BLD-MCNC: 15 MG/DL (ref 8–23)
BUN BLD-MCNC: 18 MG/DL (ref 8–23)
BUN BLD-MCNC: 28 MG/DL (ref 8–23)
BUN/CREAT SERPL: 11.6 (ref 7–25)
BUN/CREAT SERPL: 13 (ref 7–25)
BUN/CREAT SERPL: 16.7 (ref 7–25)
BUN/CREAT SERPL: 24.3 (ref 7–25)
C PNEUM DNA NPH QL NAA+NON-PROBE: NOT DETECTED
C PNEUM DNA NPH QL NAA+NON-PROBE: NOT DETECTED
CA-I BLDA-SCNC: 1.22 MMOL/L (ref 1.12–1.32)
CA-I SERPL ISE-MCNC: 1.36 MMOL/L (ref 1.2–1.3)
CA-I SERPL ISE-MCNC: 1.57 MMOL/L (ref 1.2–1.3)
CALCIUM SPEC-SCNC: 12.6 MG/DL (ref 8.6–10.5)
CALCIUM SPEC-SCNC: 8.1 MG/DL (ref 8.6–10.5)
CALCIUM SPEC-SCNC: 8.6 MG/DL (ref 8.6–10.5)
CALCIUM SPEC-SCNC: 9.9 MG/DL (ref 8.6–10.5)
CHLORIDE SERPL-SCNC: 102 MMOL/L (ref 98–107)
CHLORIDE SERPL-SCNC: 102 MMOL/L (ref 98–107)
CHLORIDE SERPL-SCNC: 93 MMOL/L (ref 98–107)
CHLORIDE SERPL-SCNC: 93 MMOL/L (ref 98–107)
CMV DNA SPEC QL NAA+PROBE: NEGATIVE
CO2 BLDA-SCNC: 25 MMOL/L (ref 23–27)
CO2 SERPL-SCNC: 22 MMOL/L (ref 22–29)
CO2 SERPL-SCNC: 23 MMOL/L (ref 22–29)
CO2 SERPL-SCNC: 23 MMOL/L (ref 22–29)
CO2 SERPL-SCNC: 25 MMOL/L (ref 22–29)
CREAT BLD-MCNC: 1.03 MG/DL (ref 0.76–1.27)
CREAT BLD-MCNC: 1.08 MG/DL (ref 0.76–1.27)
CREAT BLD-MCNC: 1.15 MG/DL (ref 0.76–1.27)
CREAT BLD-MCNC: 1.15 MG/DL (ref 0.76–1.27)
CREAT BLD-MCNC: 1.21 MG/DL (ref 0.76–1.27)
CROSSMATCH INTERPRETATION: NORMAL
CROSSMATCH INTERPRETATION: NORMAL
D DIMER PPP FEU-MCNC: 3.77 MCGFEU/ML (ref 0.17–0.59)
DEPRECATED RDW RBC AUTO: 44.6 FL (ref 37–54)
DEPRECATED RDW RBC AUTO: 47.3 FL (ref 37–54)
DEPRECATED RDW RBC AUTO: 47.7 FL (ref 37–54)
DEPRECATED RDW RBC AUTO: 49.4 FL (ref 37–54)
EOSINOPHIL # BLD AUTO: 0 10*3/MM3 (ref 0–0.4)
EOSINOPHIL # BLD AUTO: 0 10*3/MM3 (ref 0–0.4)
EOSINOPHIL # BLD AUTO: 0.1 10*3/MM3 (ref 0–0.4)
EOSINOPHIL # BLD AUTO: 0.1 10*3/MM3 (ref 0–0.4)
EOSINOPHIL NFR BLD AUTO: 0 % (ref 0.3–6.2)
EOSINOPHIL NFR BLD AUTO: 0.2 % (ref 0.3–6.2)
EOSINOPHIL NFR BLD AUTO: 0.6 % (ref 0.3–6.2)
EOSINOPHIL NFR BLD AUTO: 0.8 % (ref 0.3–6.2)
ERYTHROCYTE [DISTWIDTH] IN BLOOD BY AUTOMATED COUNT: 16.2 % (ref 12.3–15.4)
ERYTHROCYTE [DISTWIDTH] IN BLOOD BY AUTOMATED COUNT: 16.6 % (ref 12.3–15.4)
ERYTHROCYTE [DISTWIDTH] IN BLOOD BY AUTOMATED COUNT: 16.7 % (ref 12.3–15.4)
ERYTHROCYTE [DISTWIDTH] IN BLOOD BY AUTOMATED COUNT: 16.9 % (ref 12.3–15.4)
FERRITIN SERPL-MCNC: 115.7 NG/ML (ref 30–400)
FLUAV H1 2009 PAND RNA NPH QL NAA+PROBE: NOT DETECTED
FLUAV H1 2009 PAND RNA NPH QL NAA+PROBE: NOT DETECTED
FLUAV H1 HA GENE NPH QL NAA+PROBE: NOT DETECTED
FLUAV H1 HA GENE NPH QL NAA+PROBE: NOT DETECTED
FLUAV H3 RNA NPH QL NAA+PROBE: NOT DETECTED
FLUAV H3 RNA NPH QL NAA+PROBE: NOT DETECTED
FLUAV SUBTYP SPEC NAA+PROBE: NOT DETECTED
FLUAV SUBTYP SPEC NAA+PROBE: NOT DETECTED
FLUBV RNA ISLT QL NAA+PROBE: NOT DETECTED
FLUBV RNA ISLT QL NAA+PROBE: NOT DETECTED
GFR SERPL CREATININE-BSD FRML MDRD: 59 ML/MIN/1.73
GFR SERPL CREATININE-BSD FRML MDRD: 63 ML/MIN/1.73
GFR SERPL CREATININE-BSD FRML MDRD: 63 ML/MIN/1.73
GFR SERPL CREATININE-BSD FRML MDRD: 67 ML/MIN/1.73
GFR SERPL CREATININE-BSD FRML MDRD: 71 ML/MIN/1.73
GLOBULIN UR ELPH-MCNC: 2.6 GM/DL
GLOBULIN UR ELPH-MCNC: 3 GM/DL
GLOBULIN UR ELPH-MCNC: 3.4 GM/DL
GLUCOSE BLD-MCNC: 130 MG/DL (ref 65–99)
GLUCOSE BLD-MCNC: 197 MG/DL (ref 65–99)
GLUCOSE BLD-MCNC: 254 MG/DL (ref 65–99)
GLUCOSE BLD-MCNC: 350 MG/DL (ref 65–99)
GLUCOSE BLDC GLUCOMTR-MCNC: 130 MG/DL (ref 70–105)
GLUCOSE BLDC GLUCOMTR-MCNC: 132 MG/DL (ref 70–105)
GLUCOSE BLDC GLUCOMTR-MCNC: 141 MG/DL (ref 70–105)
GLUCOSE BLDC GLUCOMTR-MCNC: 143 MG/DL (ref 70–105)
GLUCOSE BLDC GLUCOMTR-MCNC: 145 MG/DL (ref 70–105)
GLUCOSE BLDC GLUCOMTR-MCNC: 145 MG/DL (ref 70–105)
GLUCOSE BLDC GLUCOMTR-MCNC: 146 MG/DL (ref 70–105)
GLUCOSE BLDC GLUCOMTR-MCNC: 152 MG/DL (ref 70–105)
GLUCOSE BLDC GLUCOMTR-MCNC: 153 MG/DL (ref 70–105)
GLUCOSE BLDC GLUCOMTR-MCNC: 154 MG/DL (ref 70–105)
GLUCOSE BLDC GLUCOMTR-MCNC: 172 MG/DL (ref 70–105)
GLUCOSE BLDC GLUCOMTR-MCNC: 174 MG/DL (ref 70–105)
GLUCOSE BLDC GLUCOMTR-MCNC: 178 MG/DL (ref 70–105)
GLUCOSE BLDC GLUCOMTR-MCNC: 191 MG/DL (ref 70–105)
GLUCOSE BLDC GLUCOMTR-MCNC: 205 MG/DL (ref 70–105)
GLUCOSE BLDC GLUCOMTR-MCNC: 206 MG/DL (ref 70–105)
GLUCOSE BLDC GLUCOMTR-MCNC: 209 MG/DL (ref 70–105)
GLUCOSE BLDC GLUCOMTR-MCNC: 210 MG/DL (ref 70–105)
GLUCOSE BLDC GLUCOMTR-MCNC: 212 MG/DL (ref 70–105)
GLUCOSE BLDC GLUCOMTR-MCNC: 217 MG/DL (ref 70–105)
GLUCOSE BLDC GLUCOMTR-MCNC: 224 MG/DL (ref 70–105)
GLUCOSE BLDC GLUCOMTR-MCNC: 225 MG/DL (ref 70–105)
GLUCOSE BLDC GLUCOMTR-MCNC: 233 MG/DL (ref 70–105)
GLUCOSE BLDC GLUCOMTR-MCNC: 234 MG/DL (ref 70–105)
GLUCOSE BLDC GLUCOMTR-MCNC: 234 MG/DL (ref 70–105)
GLUCOSE BLDC GLUCOMTR-MCNC: 243 MG/DL (ref 70–105)
GLUCOSE BLDC GLUCOMTR-MCNC: 248 MG/DL (ref 70–105)
GLUCOSE BLDC GLUCOMTR-MCNC: 269 MG/DL (ref 70–105)
GLUCOSE BLDC GLUCOMTR-MCNC: 270 MG/DL (ref 70–105)
GLUCOSE BLDC GLUCOMTR-MCNC: 278 MG/DL (ref 70–105)
GLUCOSE BLDC GLUCOMTR-MCNC: 286 MG/DL (ref 70–105)
GLUCOSE BLDC GLUCOMTR-MCNC: 292 MG/DL (ref 70–105)
GLUCOSE BLDC GLUCOMTR-MCNC: 297 MG/DL (ref 70–105)
GLUCOSE BLDC GLUCOMTR-MCNC: 297 MG/DL (ref 70–105)
GLUCOSE BLDC GLUCOMTR-MCNC: 301 MG/DL (ref 70–105)
GLUCOSE BLDC GLUCOMTR-MCNC: 302 MG/DL (ref 70–105)
GLUCOSE BLDC GLUCOMTR-MCNC: 305 MG/DL (ref 70–105)
GLUCOSE BLDC GLUCOMTR-MCNC: 305 MG/DL (ref 70–105)
GLUCOSE BLDC GLUCOMTR-MCNC: 306 MG/DL (ref 70–105)
GLUCOSE BLDC GLUCOMTR-MCNC: 313 MG/DL (ref 70–105)
GLUCOSE BLDC GLUCOMTR-MCNC: 318 MG/DL (ref 70–105)
GLUCOSE BLDC GLUCOMTR-MCNC: 348 MG/DL (ref 70–105)
GRAM STN SPEC: NORMAL
GRAM STN SPEC: NORMAL
HADV DNA SPEC NAA+PROBE: NOT DETECTED
HADV DNA SPEC NAA+PROBE: NOT DETECTED
HCO3 BLDA-SCNC: 23.6 MMOL/L (ref 22–26)
HCOV 229E RNA SPEC QL NAA+PROBE: NOT DETECTED
HCOV 229E RNA SPEC QL NAA+PROBE: NOT DETECTED
HCOV HKU1 RNA SPEC QL NAA+PROBE: NOT DETECTED
HCOV HKU1 RNA SPEC QL NAA+PROBE: NOT DETECTED
HCOV NL63 RNA SPEC QL NAA+PROBE: NOT DETECTED
HCOV NL63 RNA SPEC QL NAA+PROBE: NOT DETECTED
HCOV OC43 RNA SPEC QL NAA+PROBE: NOT DETECTED
HCOV OC43 RNA SPEC QL NAA+PROBE: NOT DETECTED
HCT VFR BLD AUTO: 27.6 % (ref 37.5–51)
HCT VFR BLD AUTO: 29.1 % (ref 37.5–51)
HCT VFR BLD AUTO: 30.3 % (ref 37.5–51)
HCT VFR BLD AUTO: 35.5 % (ref 37.5–51)
HCT VFR BLD AUTO: 38.7 % (ref 37.5–51)
HCT VFR BLDA CALC: 30 % (ref 38–51)
HGB BLD-MCNC: 10.9 G/DL (ref 13–17.7)
HGB BLD-MCNC: 12.1 G/DL (ref 13–17.7)
HGB BLD-MCNC: 12.7 G/DL (ref 13–17.7)
HGB BLD-MCNC: 9.5 G/DL (ref 13–17.7)
HGB BLD-MCNC: 9.7 G/DL (ref 13–17.7)
HGB BLDA-MCNC: 10.2 G/DL (ref 12–17)
HMPV RNA NPH QL NAA+NON-PROBE: NOT DETECTED
HMPV RNA NPH QL NAA+NON-PROBE: NOT DETECTED
HPIV1 RNA SPEC QL NAA+PROBE: NOT DETECTED
HPIV1 RNA SPEC QL NAA+PROBE: NOT DETECTED
HPIV2 RNA SPEC QL NAA+PROBE: NOT DETECTED
HPIV2 RNA SPEC QL NAA+PROBE: NOT DETECTED
HPIV3 RNA NPH QL NAA+PROBE: NOT DETECTED
HPIV3 RNA NPH QL NAA+PROBE: NOT DETECTED
HPIV4 P GENE NPH QL NAA+PROBE: NOT DETECTED
HPIV4 P GENE NPH QL NAA+PROBE: NOT DETECTED
INR PPP: 1.19 (ref 0.9–1.1)
IRON 24H UR-MRATE: 44 MCG/DL (ref 59–158)
IRON SATN MFR SERPL: 13 % (ref 20–50)
LAB AP CASE REPORT: NORMAL
LAB AP DIAGNOSIS COMMENT: NORMAL
LYMPHOCYTES # BLD AUTO: 0.5 10*3/MM3 (ref 0.7–3.1)
LYMPHOCYTES # BLD AUTO: 0.6 10*3/MM3 (ref 0.7–3.1)
LYMPHOCYTES # BLD AUTO: 1.1 10*3/MM3 (ref 0.7–3.1)
LYMPHOCYTES # BLD AUTO: 1.3 10*3/MM3 (ref 0.7–3.1)
LYMPHOCYTES NFR BLD AUTO: 12.4 % (ref 19.6–45.3)
LYMPHOCYTES NFR BLD AUTO: 4.6 % (ref 19.6–45.3)
LYMPHOCYTES NFR BLD AUTO: 5.9 % (ref 19.6–45.3)
LYMPHOCYTES NFR BLD AUTO: 9.5 % (ref 19.6–45.3)
Lab: NORMAL
M PNEUMO IGG SER IA-ACNC: NOT DETECTED
M PNEUMO IGG SER IA-ACNC: NOT DETECTED
MCH RBC QN AUTO: 27 PG (ref 26.6–33)
MCH RBC QN AUTO: 27 PG (ref 26.6–33)
MCH RBC QN AUTO: 27.3 PG (ref 26.6–33)
MCH RBC QN AUTO: 28.1 PG (ref 26.6–33)
MCHC RBC AUTO-ENTMCNC: 32.6 G/DL (ref 31.5–35.7)
MCHC RBC AUTO-ENTMCNC: 33 G/DL (ref 31.5–35.7)
MCHC RBC AUTO-ENTMCNC: 34.1 G/DL (ref 31.5–35.7)
MCHC RBC AUTO-ENTMCNC: 35.8 G/DL (ref 31.5–35.7)
MCV RBC AUTO: 78.5 FL (ref 79–97)
MCV RBC AUTO: 80 FL (ref 79–97)
MCV RBC AUTO: 81.8 FL (ref 79–97)
MCV RBC AUTO: 83 FL (ref 79–97)
MONOCYTES # BLD AUTO: 0.5 10*3/MM3 (ref 0.1–0.9)
MONOCYTES # BLD AUTO: 0.6 10*3/MM3 (ref 0.1–0.9)
MONOCYTES # BLD AUTO: 0.7 10*3/MM3 (ref 0.1–0.9)
MONOCYTES # BLD AUTO: 1.6 10*3/MM3 (ref 0.1–0.9)
MONOCYTES NFR BLD AUTO: 11.6 % (ref 5–12)
MONOCYTES NFR BLD AUTO: 4.5 % (ref 5–12)
MONOCYTES NFR BLD AUTO: 6.3 % (ref 5–12)
MONOCYTES NFR BLD AUTO: 7.8 % (ref 5–12)
NEUTROPHILS # BLD AUTO: 10.5 10*3/MM3 (ref 1.7–7)
NEUTROPHILS # BLD AUTO: 6.8 10*3/MM3 (ref 1.7–7)
NEUTROPHILS # BLD AUTO: 8.2 10*3/MM3 (ref 1.7–7)
NEUTROPHILS # BLD AUTO: 9.2 10*3/MM3 (ref 1.7–7)
NEUTROPHILS NFR BLD AUTO: 77.8 % (ref 42.7–76)
NEUTROPHILS NFR BLD AUTO: 78.9 % (ref 42.7–76)
NEUTROPHILS NFR BLD AUTO: 87.5 % (ref 42.7–76)
NEUTROPHILS NFR BLD AUTO: 90.8 % (ref 42.7–76)
NRBC BLD AUTO-RTO: 0 /100 WBC (ref 0–0.2)
NRBC BLD AUTO-RTO: 0.1 /100 WBC (ref 0–0.2)
PATH REPORT.FINAL DX SPEC: NORMAL
PATH REPORT.GROSS SPEC: NORMAL
PCO2 BLDA: 44 MM HG (ref 35–45)
PH BLDA: 7.34 PH UNITS (ref 7.35–7.45)
PLATELET # BLD AUTO: 202 10*3/MM3 (ref 140–450)
PLATELET # BLD AUTO: 212 10*3/MM3 (ref 140–450)
PLATELET # BLD AUTO: 217 10*3/MM3 (ref 140–450)
PLATELET # BLD AUTO: 261 10*3/MM3 (ref 140–450)
PMV BLD AUTO: 7.3 FL (ref 6–12)
PMV BLD AUTO: 7.5 FL (ref 6–12)
PMV BLD AUTO: 7.7 FL (ref 6–12)
PMV BLD AUTO: 7.9 FL (ref 6–12)
PO2 BLDA: 293 MM HG (ref 80–105)
POTASSIUM BLD-SCNC: 3.8 MMOL/L (ref 3.5–5.2)
POTASSIUM BLD-SCNC: 4.2 MMOL/L (ref 3.5–5.2)
POTASSIUM BLD-SCNC: 4.3 MMOL/L (ref 3.5–5.2)
POTASSIUM BLD-SCNC: 4.4 MMOL/L (ref 3.5–5.2)
POTASSIUM BLDA-SCNC: 4.1 MMOL/L (ref 3.5–4.9)
PROT SERPL-MCNC: 6.1 G/DL (ref 6–8.5)
PROT SERPL-MCNC: 6.5 G/DL (ref 6–8.5)
PROT SERPL-MCNC: 7.5 G/DL (ref 6–8.5)
PROTHROMBIN TIME: 12.1 SECONDS (ref 9.6–11.7)
RBC # BLD AUTO: 3.5 10*6/MM3 (ref 4.14–5.8)
RBC # BLD AUTO: 3.86 10*6/MM3 (ref 4.14–5.8)
RBC # BLD AUTO: 4.43 10*6/MM3 (ref 4.14–5.8)
RBC # BLD AUTO: 4.73 10*6/MM3 (ref 4.14–5.8)
RH BLD: POSITIVE
RHINOVIRUS RNA SPEC NAA+PROBE: NOT DETECTED
RHINOVIRUS RNA SPEC NAA+PROBE: NOT DETECTED
RSV RNA NPH QL NAA+NON-PROBE: NOT DETECTED
RSV RNA NPH QL NAA+NON-PROBE: NOT DETECTED
SAO2 % BLDCOA: 100 % (ref 95–98)
SARS-COV-2 RNA RESP QL NAA+PROBE: NOT DETECTED
SODIUM BLD-SCNC: 129 MMOL/L (ref 136–145)
SODIUM BLD-SCNC: 130 MMOL/L (ref 136–145)
SODIUM BLD-SCNC: 138 MMOL/L (ref 136–145)
SODIUM BLD-SCNC: 138 MMOL/L (ref 136–145)
SODIUM BLDA-SCNC: 134 MMOL/L (ref 138–146)
SPECIMEN SOURCE: NORMAL
SPECIMEN SOURCE: NORMAL
T&S EXPIRATION DATE: NORMAL
TIBC SERPL-MCNC: 350 MCG/DL (ref 298–536)
TRANSFERRIN SERPL-MCNC: 235 MG/DL (ref 200–360)
TROPONIN T SERPL-MCNC: <0.01 NG/ML (ref 0–0.03)
UNIT  ABO: NORMAL
UNIT  ABO: NORMAL
UNIT  RH: NORMAL
UNIT  RH: NORMAL
WBC NRBC COR # BLD: 10.1 10*3/MM3 (ref 3.4–10.8)
WBC NRBC COR # BLD: 13.4 10*3/MM3 (ref 3.4–10.8)
WBC NRBC COR # BLD: 8.6 10*3/MM3 (ref 3.4–10.8)
WBC NRBC COR # BLD: 9.3 10*3/MM3 (ref 3.4–10.8)

## 2020-01-01 PROCEDURE — C1713 ANCHOR/SCREW BN/BN,TIS/BN: HCPCS | Performed by: NEUROLOGICAL SURGERY

## 2020-01-01 PROCEDURE — 85025 COMPLETE CBC W/AUTO DIFF WBC: CPT | Performed by: NURSE PRACTITIONER

## 2020-01-01 PROCEDURE — 86900 BLOOD TYPING SEROLOGIC ABO: CPT

## 2020-01-01 PROCEDURE — 82803 BLOOD GASES ANY COMBINATION: CPT

## 2020-01-01 PROCEDURE — 71275 CT ANGIOGRAPHY CHEST: CPT

## 2020-01-01 PROCEDURE — 72040 X-RAY EXAM NECK SPINE 2-3 VW: CPT

## 2020-01-01 PROCEDURE — 88177 CYTP FNA EVAL EA ADDL: CPT | Performed by: INTERNAL MEDICINE

## 2020-01-01 PROCEDURE — 85014 HEMATOCRIT: CPT | Performed by: NURSE PRACTITIONER

## 2020-01-01 PROCEDURE — 25010000002 HYDROMORPHONE PER 4 MG: Performed by: NURSE PRACTITIONER

## 2020-01-01 PROCEDURE — 72156 MRI NECK SPINE W/O & W/DYE: CPT

## 2020-01-01 PROCEDURE — 88341 IMHCHEM/IMCYTCHM EA ADD ANTB: CPT | Performed by: INTERNAL MEDICINE

## 2020-01-01 PROCEDURE — G0378 HOSPITAL OBSERVATION PER HR: HCPCS

## 2020-01-01 PROCEDURE — 22585 ARTHRD ANT NTRBD MIN DSC EA: CPT | Performed by: NEUROLOGICAL SURGERY

## 2020-01-01 PROCEDURE — 82330 ASSAY OF CALCIUM: CPT

## 2020-01-01 PROCEDURE — 87070 CULTURE OTHR SPECIMN AEROBIC: CPT | Performed by: INTERNAL MEDICINE

## 2020-01-01 PROCEDURE — 82728 ASSAY OF FERRITIN: CPT | Performed by: INTERNAL MEDICINE

## 2020-01-01 PROCEDURE — 80053 COMPREHEN METABOLIC PANEL: CPT | Performed by: FAMILY MEDICINE

## 2020-01-01 PROCEDURE — 63710000001 DEXAMETHASONE PER 0.25 MG: Performed by: NEUROLOGICAL SURGERY

## 2020-01-01 PROCEDURE — 85730 THROMBOPLASTIN TIME PARTIAL: CPT | Performed by: ANESTHESIOLOGY

## 2020-01-01 PROCEDURE — 25010000002 FENTANYL CITRATE (PF) 100 MCG/2ML SOLUTION: Performed by: ANESTHESIOLOGIST ASSISTANT

## 2020-01-01 PROCEDURE — 25010000002 ENOXAPARIN PER 10 MG: Performed by: NURSE PRACTITIONER

## 2020-01-01 PROCEDURE — 85018 HEMOGLOBIN: CPT | Performed by: NURSE PRACTITIONER

## 2020-01-01 PROCEDURE — 63710000001 INSULIN LISPRO (HUMAN) PER 5 UNITS: Performed by: FAMILY MEDICINE

## 2020-01-01 PROCEDURE — 87206 SMEAR FLUORESCENT/ACID STAI: CPT | Performed by: INTERNAL MEDICINE

## 2020-01-01 PROCEDURE — 63710000001 INSULIN LISPRO (HUMAN) PER 5 UNITS: Performed by: INTERNAL MEDICINE

## 2020-01-01 PROCEDURE — 25010000002 PROPOFOL 10 MG/ML EMULSION: Performed by: ANESTHESIOLOGY

## 2020-01-01 PROCEDURE — 25010000002 HYDRALAZINE PER 20 MG: Performed by: ANESTHESIOLOGY

## 2020-01-01 PROCEDURE — 92950 HEART/LUNG RESUSCITATION CPR: CPT

## 2020-01-01 PROCEDURE — 36430 TRANSFUSION BLD/BLD COMPNT: CPT

## 2020-01-01 PROCEDURE — 25010000002 SUCCINYLCHOLINE PER 20 MG: Performed by: NURSE ANESTHETIST, CERTIFIED REGISTERED

## 2020-01-01 PROCEDURE — 84466 ASSAY OF TRANSFERRIN: CPT | Performed by: INTERNAL MEDICINE

## 2020-01-01 PROCEDURE — 97116 GAIT TRAINING THERAPY: CPT

## 2020-01-01 PROCEDURE — 25010000003 AMPICILLIN-SULBACTAM PER 1.5 G: Performed by: INTERNAL MEDICINE

## 2020-01-01 PROCEDURE — 84295 ASSAY OF SERUM SODIUM: CPT

## 2020-01-01 PROCEDURE — 99024 POSTOP FOLLOW-UP VISIT: CPT | Performed by: NURSE PRACTITIONER

## 2020-01-01 PROCEDURE — 22843 INSERT SPINE FIXATION DEVICE: CPT | Performed by: NEUROLOGICAL SURGERY

## 2020-01-01 PROCEDURE — 71045 X-RAY EXAM CHEST 1 VIEW: CPT

## 2020-01-01 PROCEDURE — 25010000002 EPINEPHRINE 1 MG/10ML SOLUTION PREFILLED SYRINGE: Performed by: EMERGENCY MEDICINE

## 2020-01-01 PROCEDURE — 87205 SMEAR GRAM STAIN: CPT | Performed by: INTERNAL MEDICINE

## 2020-01-01 PROCEDURE — 72100 X-RAY EXAM L-S SPINE 2/3 VWS: CPT

## 2020-01-01 PROCEDURE — 22554 ARTHRD ANT NTRBD MIN DSC CRV: CPT | Performed by: NEUROLOGICAL SURGERY

## 2020-01-01 PROCEDURE — 97162 PT EVAL MOD COMPLEX 30 MIN: CPT

## 2020-01-01 PROCEDURE — 82962 GLUCOSE BLOOD TEST: CPT

## 2020-01-01 PROCEDURE — 85379 FIBRIN DEGRADATION QUANT: CPT | Performed by: PHYSICIAN ASSISTANT

## 2020-01-01 PROCEDURE — 72129 CT CHEST SPINE W/DYE: CPT

## 2020-01-01 PROCEDURE — 97530 THERAPEUTIC ACTIVITIES: CPT

## 2020-01-01 PROCEDURE — 63710000001 INSULIN LISPRO (HUMAN) PER 5 UNITS: Performed by: NEUROLOGICAL SURGERY

## 2020-01-01 PROCEDURE — 72126 CT NECK SPINE W/DYE: CPT

## 2020-01-01 PROCEDURE — 76000 FLUOROSCOPY <1 HR PHYS/QHP: CPT

## 2020-01-01 PROCEDURE — 63710000001 INSULIN REGULAR HUMAN PER 5 UNITS: Performed by: FAMILY MEDICINE

## 2020-01-01 PROCEDURE — 85610 PROTHROMBIN TIME: CPT | Performed by: NURSE PRACTITIONER

## 2020-01-01 PROCEDURE — 63710000001 INSULIN REGULAR HUMAN PER 5 UNITS: Performed by: ANESTHESIOLOGY

## 2020-01-01 PROCEDURE — 22614 ARTHRD PST TQ 1NTRSPC EA ADD: CPT | Performed by: NEUROLOGICAL SURGERY

## 2020-01-01 PROCEDURE — 25010000002 ZOLEDRONIC ACID PER 1 MG: Performed by: INTERNAL MEDICINE

## 2020-01-01 PROCEDURE — 25010000002 GADOTERIDOL PER 1 ML: Performed by: FAMILY MEDICINE

## 2020-01-01 PROCEDURE — 25010000002 MORPHINE PER 10 MG: Performed by: FAMILY MEDICINE

## 2020-01-01 PROCEDURE — 0PS33ZZ REPOSITION CERVICAL VERTEBRA, PERCUTANEOUS APPROACH: ICD-10-PCS | Performed by: NEUROLOGICAL SURGERY

## 2020-01-01 PROCEDURE — 25010000002 HYDROMORPHONE PER 4 MG: Performed by: NEUROLOGICAL SURGERY

## 2020-01-01 PROCEDURE — 25010000002 ONDANSETRON PER 1 MG: Performed by: FAMILY MEDICINE

## 2020-01-01 PROCEDURE — 25010000002 ALBUMIN HUMAN 5% PER 50 ML: Performed by: NURSE ANESTHETIST, CERTIFIED REGISTERED

## 2020-01-01 PROCEDURE — 88342 IMHCHEM/IMCYTCHM 1ST ANTB: CPT | Performed by: INTERNAL MEDICINE

## 2020-01-01 PROCEDURE — P9016 RBC LEUKOCYTES REDUCED: HCPCS

## 2020-01-01 PROCEDURE — 22600 ARTHRD PST TQ 1NTRSPC CRV: CPT | Performed by: NEUROLOGICAL SURGERY

## 2020-01-01 PROCEDURE — 88173 CYTOPATH EVAL FNA REPORT: CPT | Performed by: INTERNAL MEDICINE

## 2020-01-01 PROCEDURE — 25010000002 HYDROMORPHONE PER 4 MG: Performed by: ANESTHESIOLOGY

## 2020-01-01 PROCEDURE — 88112 CYTOPATH CELL ENHANCE TECH: CPT | Performed by: INTERNAL MEDICINE

## 2020-01-01 PROCEDURE — 70470 CT HEAD/BRAIN W/O & W/DYE: CPT

## 2020-01-01 PROCEDURE — 87798 DETECT AGENT NOS DNA AMP: CPT | Performed by: FAMILY MEDICINE

## 2020-01-01 PROCEDURE — 07D78ZX EXTRACTION OF THORAX LYMPHATIC, VIA NATURAL OR ARTIFICIAL OPENING ENDOSCOPIC, DIAGNOSTIC: ICD-10-PCS | Performed by: INTERNAL MEDICINE

## 2020-01-01 PROCEDURE — 87496 CYTOMEG DNA AMP PROBE: CPT | Performed by: INTERNAL MEDICINE

## 2020-01-01 PROCEDURE — 25010000002 FENTANYL CITRATE (PF) 100 MCG/2ML SOLUTION: Performed by: ANESTHESIOLOGY

## 2020-01-01 PROCEDURE — 88108 CYTOPATH CONCENTRATE TECH: CPT | Performed by: INTERNAL MEDICINE

## 2020-01-01 PROCEDURE — A9579 GAD-BASE MR CONTRAST NOS,1ML: HCPCS | Performed by: FAMILY MEDICINE

## 2020-01-01 PROCEDURE — 74177 CT ABD & PELVIS W/CONTRAST: CPT

## 2020-01-01 PROCEDURE — 86850 RBC ANTIBODY SCREEN: CPT | Performed by: ANESTHESIOLOGY

## 2020-01-01 PROCEDURE — 82330 ASSAY OF CALCIUM: CPT | Performed by: INTERNAL MEDICINE

## 2020-01-01 PROCEDURE — 72157 MRI CHEST SPINE W/O & W/DYE: CPT

## 2020-01-01 PROCEDURE — 63710000001 INSULIN LISPRO (HUMAN) PER 5 UNITS: Performed by: ANESTHESIOLOGY

## 2020-01-01 PROCEDURE — 85025 COMPLETE CBC W/AUTO DIFF WBC: CPT | Performed by: PHYSICIAN ASSISTANT

## 2020-01-01 PROCEDURE — 0 IOPAMIDOL PER 1 ML: Performed by: PHYSICIAN ASSISTANT

## 2020-01-01 PROCEDURE — 25010000002 ONDANSETRON PER 1 MG: Performed by: ANESTHESIOLOGY

## 2020-01-01 PROCEDURE — 25010000002 DEXAMETHASONE PER 1 MG: Performed by: NURSE ANESTHETIST, CERTIFIED REGISTERED

## 2020-01-01 PROCEDURE — 25010000002 PROPOFOL 10 MG/ML EMULSION: Performed by: ANESTHESIOLOGIST ASSISTANT

## 2020-01-01 PROCEDURE — 22510 PERQ CERVICOTHORACIC INJECT: CPT | Performed by: NEUROLOGICAL SURGERY

## 2020-01-01 PROCEDURE — 0RG20K0 FUSION OF 2 OR MORE CERVICAL VERTEBRAL JOINTS WITH NONAUTOLOGOUS TISSUE SUBSTITUTE, ANTERIOR APPROACH, ANTERIOR COLUMN, OPEN APPROACH: ICD-10-PCS | Performed by: NEUROLOGICAL SURGERY

## 2020-01-01 PROCEDURE — 80053 COMPREHEN METABOLIC PANEL: CPT | Performed by: PHYSICIAN ASSISTANT

## 2020-01-01 PROCEDURE — 99024 POSTOP FOLLOW-UP VISIT: CPT | Performed by: NEUROLOGICAL SURGERY

## 2020-01-01 PROCEDURE — P9041 ALBUMIN (HUMAN),5%, 50ML: HCPCS | Performed by: NURSE ANESTHETIST, CERTIFIED REGISTERED

## 2020-01-01 PROCEDURE — 85014 HEMATOCRIT: CPT

## 2020-01-01 PROCEDURE — 25010000002 CEFAZOLIN PER 500 MG: Performed by: NURSE PRACTITIONER

## 2020-01-01 PROCEDURE — 86900 BLOOD TYPING SEROLOGIC ABO: CPT | Performed by: ANESTHESIOLOGY

## 2020-01-01 PROCEDURE — 0BDF8ZX EXTRACTION OF RIGHT LOWER LUNG LOBE, VIA NATURAL OR ARTIFICIAL OPENING ENDOSCOPIC, DIAGNOSTIC: ICD-10-PCS | Performed by: INTERNAL MEDICINE

## 2020-01-01 PROCEDURE — 82947 ASSAY GLUCOSE BLOOD QUANT: CPT

## 2020-01-01 PROCEDURE — 87116 MYCOBACTERIA CULTURE: CPT | Performed by: INTERNAL MEDICINE

## 2020-01-01 PROCEDURE — 85025 COMPLETE CBC W/AUTO DIFF WBC: CPT | Performed by: FAMILY MEDICINE

## 2020-01-01 PROCEDURE — 0099U HC BIOFIRE FILMARRAY RESP PANEL 1: CPT | Performed by: INTERNAL MEDICINE

## 2020-01-01 PROCEDURE — 80048 BASIC METABOLIC PNL TOTAL CA: CPT

## 2020-01-01 PROCEDURE — 86923 COMPATIBILITY TEST ELECTRIC: CPT

## 2020-01-01 PROCEDURE — 0RT30ZZ RESECTION OF CERVICAL VERTEBRAL DISC, OPEN APPROACH: ICD-10-PCS | Performed by: NEUROLOGICAL SURGERY

## 2020-01-01 PROCEDURE — 80053 COMPREHEN METABOLIC PANEL: CPT | Performed by: INTERNAL MEDICINE

## 2020-01-01 PROCEDURE — 0PU33JZ SUPPLEMENT CERVICAL VERTEBRA WITH SYNTHETIC SUBSTITUTE, PERCUTANEOUS APPROACH: ICD-10-PCS | Performed by: NEUROLOGICAL SURGERY

## 2020-01-01 PROCEDURE — 99284 EMERGENCY DEPT VISIT MOD MDM: CPT

## 2020-01-01 PROCEDURE — 93000 ELECTROCARDIOGRAM COMPLETE: CPT | Performed by: INTERNAL MEDICINE

## 2020-01-01 PROCEDURE — 72132 CT LUMBAR SPINE W/DYE: CPT

## 2020-01-01 PROCEDURE — 99283 EMERGENCY DEPT VISIT LOW MDM: CPT

## 2020-01-01 PROCEDURE — 25010000002 IRON SUCROSE PER 1 MG: Performed by: NURSE PRACTITIONER

## 2020-01-01 PROCEDURE — C1726 CATH, BAL DIL, NON-VASCULAR: HCPCS | Performed by: INTERNAL MEDICINE

## 2020-01-01 PROCEDURE — 99231 SBSQ HOSP IP/OBS SF/LOW 25: CPT | Performed by: NEUROLOGICAL SURGERY

## 2020-01-01 PROCEDURE — 84484 ASSAY OF TROPONIN QUANT: CPT | Performed by: PHYSICIAN ASSISTANT

## 2020-01-01 PROCEDURE — 84132 ASSAY OF SERUM POTASSIUM: CPT

## 2020-01-01 PROCEDURE — 0B9F8ZX DRAINAGE OF RIGHT LOWER LUNG LOBE, VIA NATURAL OR ARTIFICIAL OPENING ENDOSCOPIC, DIAGNOSTIC: ICD-10-PCS | Performed by: INTERNAL MEDICINE

## 2020-01-01 PROCEDURE — 72020 X-RAY EXAM OF SPINE 1 VIEW: CPT

## 2020-01-01 PROCEDURE — 88304 TISSUE EXAM BY PATHOLOGIST: CPT | Performed by: NEUROLOGICAL SURGERY

## 2020-01-01 PROCEDURE — 70553 MRI BRAIN STEM W/O & W/DYE: CPT

## 2020-01-01 PROCEDURE — 87102 FUNGUS ISOLATION CULTURE: CPT | Performed by: INTERNAL MEDICINE

## 2020-01-01 PROCEDURE — 25010000002 KETOROLAC TROMETHAMINE PER 15 MG: Performed by: NEUROLOGICAL SURGERY

## 2020-01-01 PROCEDURE — 63300 REMOVE VERT XDRL BODY CRVCL: CPT | Performed by: NEUROLOGICAL SURGERY

## 2020-01-01 PROCEDURE — 22854 INSJ BIOMECHANICAL DEVICE: CPT | Performed by: NEUROLOGICAL SURGERY

## 2020-01-01 PROCEDURE — 22512 VERTEBROPLASTY ADDL INJECT: CPT | Performed by: NEUROLOGICAL SURGERY

## 2020-01-01 PROCEDURE — 83540 ASSAY OF IRON: CPT | Performed by: INTERNAL MEDICINE

## 2020-01-01 PROCEDURE — 25010000002 CEFTRIAXONE PER 250 MG: Performed by: PHYSICIAN ASSISTANT

## 2020-01-01 PROCEDURE — 87635 SARS-COV-2 COVID-19 AMP PRB: CPT | Performed by: PHYSICIAN ASSISTANT

## 2020-01-01 PROCEDURE — 81479 UNLISTED MOLECULAR PATHOLOGY: CPT

## 2020-01-01 PROCEDURE — 88305 TISSUE EXAM BY PATHOLOGIST: CPT | Performed by: INTERNAL MEDICINE

## 2020-01-01 PROCEDURE — 99214 OFFICE O/P EST MOD 30 MIN: CPT | Performed by: INTERNAL MEDICINE

## 2020-01-01 PROCEDURE — 85025 COMPLETE CBC W/AUTO DIFF WBC: CPT | Performed by: INTERNAL MEDICINE

## 2020-01-01 PROCEDURE — 86901 BLOOD TYPING SEROLOGIC RH(D): CPT

## 2020-01-01 PROCEDURE — 0 IOPAMIDOL PER 1 ML: Performed by: FAMILY MEDICINE

## 2020-01-01 PROCEDURE — 86901 BLOOD TYPING SEROLOGIC RH(D): CPT | Performed by: ANESTHESIOLOGY

## 2020-01-01 PROCEDURE — 93005 ELECTROCARDIOGRAM TRACING: CPT | Performed by: PHYSICIAN ASSISTANT

## 2020-01-01 PROCEDURE — 0099U HC BIOFIRE FILMARRAY RESP PANEL 1: CPT | Performed by: PHYSICIAN ASSISTANT

## 2020-01-01 PROCEDURE — 82565 ASSAY OF CREATININE: CPT | Performed by: INTERNAL MEDICINE

## 2020-01-01 PROCEDURE — 25010000002 MIDAZOLAM PER 1 MG: Performed by: ANESTHESIOLOGY

## 2020-01-01 PROCEDURE — 88172 CYTP DX EVAL FNA 1ST EA SITE: CPT | Performed by: INTERNAL MEDICINE

## 2020-01-01 PROCEDURE — 25010000002 HYDROMORPHONE PER 4 MG: Performed by: NURSE ANESTHETIST, CERTIFIED REGISTERED

## 2020-01-01 DEVICE — ROD 1553201120 5.5 TI CP4 NS CURV 120MM
Type: IMPLANTABLE DEVICE | Site: SPINE CERVICAL | Status: FUNCTIONAL
Brand: CD HORIZON® SPINAL SYSTEM

## 2020-01-01 DEVICE — TRANSITION MAS G3605535 5.5 X 35MM
Type: IMPLANTABLE DEVICE | Site: SPINE CERVICAL | Status: FUNCTIONAL
Brand: INFINITY™ OCCIPITOCERVICAL UPPER THORACIC SYSTEM

## 2020-01-01 DEVICE — IMPLANTABLE DEVICE: Type: IMPLANTABLE DEVICE | Site: SPINE CERVICAL | Status: FUNCTIONAL

## 2020-01-01 DEVICE — ALLOGRFT SPNG STRIP OSTEOAMP COMPRESSIBLE LG 50X10X7MM: Type: IMPLANTABLE DEVICE | Site: SPINE CERVICAL | Status: FUNCTIONAL

## 2020-01-01 DEVICE — SYSTEM WITHOUT NEEDLES WITH HV BONE CEMENT
Type: IMPLANTABLE DEVICE | Site: SPINE LUMBAR | Status: FUNCTIONAL
Brand: AUTOPLEX, VERTAPLEX

## 2020-01-01 DEVICE — FLOSEAL HEMOSTATIC MATRIX, 10 ML
Type: IMPLANTABLE DEVICE | Site: SPINE CERVICAL | Status: FUNCTIONAL
Brand: FLOSEAL

## 2020-01-01 DEVICE — GRFT BONE MAGNIFUSE PC 1X10CM: Type: IMPLANTABLE DEVICE | Site: SPINE CERVICAL | Status: FUNCTIONAL

## 2020-01-01 RX ORDER — ACETAMINOPHEN 650 MG/1
650 SUPPOSITORY RECTAL ONCE AS NEEDED
Status: DISCONTINUED | OUTPATIENT
Start: 2020-01-01 | End: 2020-01-01 | Stop reason: HOSPADM

## 2020-01-01 RX ORDER — EPHEDRINE SULFATE 50 MG/ML
5 INJECTION, SOLUTION INTRAVENOUS ONCE AS NEEDED
Status: DISCONTINUED | OUTPATIENT
Start: 2020-01-01 | End: 2020-01-01 | Stop reason: HOSPADM

## 2020-01-01 RX ORDER — PREGABALIN 100 MG/1
100 CAPSULE ORAL EVERY 12 HOURS SCHEDULED
Qty: 60 CAPSULE | Refills: 1 | Status: SHIPPED | OUTPATIENT
Start: 2020-01-01

## 2020-01-01 RX ORDER — ASPIRIN 81 MG/1
81 TABLET ORAL DAILY
Status: DISCONTINUED | OUTPATIENT
Start: 2020-01-01 | End: 2020-01-01

## 2020-01-01 RX ORDER — HYDROCODONE BITARTRATE AND ACETAMINOPHEN 5; 325 MG/1; MG/1
1 TABLET ORAL ONCE AS NEEDED
Status: DISCONTINUED | OUTPATIENT
Start: 2020-01-01 | End: 2020-01-01 | Stop reason: HOSPADM

## 2020-01-01 RX ORDER — MORPHINE SULFATE 4 MG/ML
2 INJECTION, SOLUTION INTRAMUSCULAR; INTRAVENOUS
Status: DISCONTINUED | OUTPATIENT
Start: 2020-01-01 | End: 2020-01-01 | Stop reason: HOSPADM

## 2020-01-01 RX ORDER — PREGABALIN 100 MG/1
100 CAPSULE ORAL EVERY 12 HOURS SCHEDULED
Status: DISCONTINUED | OUTPATIENT
Start: 2020-01-01 | End: 2020-01-01 | Stop reason: SDUPTHER

## 2020-01-01 RX ORDER — HYDROCODONE BITARTRATE AND ACETAMINOPHEN 7.5; 325 MG/1; MG/1
1 TABLET ORAL EVERY 6 HOURS PRN
Qty: 60 TABLET | Refills: 0 | Status: ON HOLD | OUTPATIENT
Start: 2020-01-01 | End: 2020-01-01

## 2020-01-01 RX ORDER — NITROGLYCERIN 0.4 MG/1
0.4 TABLET SUBLINGUAL
Status: DISCONTINUED | OUTPATIENT
Start: 2020-01-01 | End: 2020-01-01 | Stop reason: HOSPADM

## 2020-01-01 RX ORDER — ASPIRIN 81 MG/1
81 TABLET ORAL ONCE
Status: COMPLETED | OUTPATIENT
Start: 2020-01-01 | End: 2020-01-01

## 2020-01-01 RX ORDER — HYDROMORPHONE HCL 110MG/55ML
1 PATIENT CONTROLLED ANALGESIA SYRINGE INTRAVENOUS ONCE
Status: COMPLETED | OUTPATIENT
Start: 2020-01-01 | End: 2020-01-01

## 2020-01-01 RX ORDER — ISOSORBIDE MONONITRATE 60 MG/1
60 TABLET, EXTENDED RELEASE ORAL DAILY
COMMUNITY

## 2020-01-01 RX ORDER — LIDOCAINE HYDROCHLORIDE AND EPINEPHRINE 10; 10 MG/ML; UG/ML
INJECTION, SOLUTION INFILTRATION; PERINEURAL AS NEEDED
Status: DISCONTINUED | OUTPATIENT
Start: 2020-01-01 | End: 2020-01-01 | Stop reason: HOSPADM

## 2020-01-01 RX ORDER — PHENYLEPHRINE HCL IN 0.9% NACL 0.5 MG/5ML
.5-3 SYRINGE (ML) INTRAVENOUS
Status: DISCONTINUED | OUTPATIENT
Start: 2020-01-01 | End: 2020-01-01 | Stop reason: HOSPADM

## 2020-01-01 RX ORDER — AMLODIPINE BESYLATE 5 MG/1
10 TABLET ORAL ONCE
Status: COMPLETED | OUTPATIENT
Start: 2020-01-01 | End: 2020-01-01

## 2020-01-01 RX ORDER — ONDANSETRON 2 MG/ML
4 INJECTION INTRAMUSCULAR; INTRAVENOUS ONCE
Status: COMPLETED | OUTPATIENT
Start: 2020-01-01 | End: 2020-01-01

## 2020-01-01 RX ORDER — HYDRALAZINE HYDROCHLORIDE 20 MG/ML
5 INJECTION INTRAMUSCULAR; INTRAVENOUS
Status: DISCONTINUED | OUTPATIENT
Start: 2020-01-01 | End: 2020-01-01 | Stop reason: HOSPADM

## 2020-01-01 RX ORDER — ACETAMINOPHEN 325 MG/1
650 TABLET ORAL ONCE AS NEEDED
Status: DISCONTINUED | OUTPATIENT
Start: 2020-01-01 | End: 2020-01-01 | Stop reason: HOSPADM

## 2020-01-01 RX ORDER — AMLODIPINE BESYLATE 5 MG/1
10 TABLET ORAL DAILY
Status: DISCONTINUED | OUTPATIENT
Start: 2020-01-01 | End: 2020-01-01 | Stop reason: HOSPADM

## 2020-01-01 RX ORDER — HYDROCODONE BITARTRATE AND ACETAMINOPHEN 10; 325 MG/1; MG/1
1 TABLET ORAL EVERY 4 HOURS PRN
Status: DISCONTINUED | OUTPATIENT
Start: 2020-01-01 | End: 2020-01-01 | Stop reason: HOSPADM

## 2020-01-01 RX ORDER — ROSUVASTATIN CALCIUM 10 MG/1
1 TABLET, COATED ORAL DAILY
COMMUNITY
Start: 2019-01-01

## 2020-01-01 RX ORDER — ROSUVASTATIN CALCIUM 10 MG/1
10 TABLET, COATED ORAL DAILY
Status: DISCONTINUED | OUTPATIENT
Start: 2020-01-01 | End: 2020-01-01 | Stop reason: HOSPADM

## 2020-01-01 RX ORDER — HYDROCODONE BITARTRATE AND ACETAMINOPHEN 10; 325 MG/1; MG/1
1 TABLET ORAL EVERY 4 HOURS PRN
Qty: 90 TABLET | Refills: 0 | Status: SHIPPED | OUTPATIENT
Start: 2020-01-01

## 2020-01-01 RX ORDER — GLIPIZIDE 5 MG/1
5 TABLET ORAL
Status: DISCONTINUED | OUTPATIENT
Start: 2020-01-01 | End: 2020-01-01 | Stop reason: HOSPADM

## 2020-01-01 RX ORDER — OXYCODONE HYDROCHLORIDE 5 MG/1
5 TABLET ORAL ONCE AS NEEDED
Status: DISCONTINUED | OUTPATIENT
Start: 2020-01-01 | End: 2020-01-01 | Stop reason: HOSPADM

## 2020-01-01 RX ORDER — RANITIDINE HCL 75 MG
75 TABLET ORAL DAILY
COMMUNITY
End: 2020-01-01 | Stop reason: HOSPADM

## 2020-01-01 RX ORDER — ATENOLOL 25 MG/1
25 TABLET ORAL 2 TIMES DAILY
Status: DISCONTINUED | OUTPATIENT
Start: 2020-01-01 | End: 2020-01-01 | Stop reason: HOSPADM

## 2020-01-01 RX ORDER — ISOSORBIDE MONONITRATE 60 MG/1
60 TABLET, EXTENDED RELEASE ORAL DAILY
Status: DISCONTINUED | OUTPATIENT
Start: 2020-01-01 | End: 2020-01-01 | Stop reason: HOSPADM

## 2020-01-01 RX ORDER — ALPRAZOLAM 0.5 MG/1
0.5 TABLET ORAL 3 TIMES DAILY PRN
Status: DISCONTINUED | OUTPATIENT
Start: 2020-01-01 | End: 2020-01-01 | Stop reason: HOSPADM

## 2020-01-01 RX ORDER — PANTOPRAZOLE SODIUM 40 MG/1
1 TABLET, DELAYED RELEASE ORAL DAILY
COMMUNITY
Start: 2019-01-01 | End: 2020-01-01

## 2020-01-01 RX ORDER — CHLORAL HYDRATE 500 MG
1000 CAPSULE ORAL
COMMUNITY

## 2020-01-01 RX ORDER — SODIUM CHLORIDE 9 MG/ML
9 INJECTION, SOLUTION INTRAVENOUS CONTINUOUS PRN
Status: DISCONTINUED | OUTPATIENT
Start: 2020-01-01 | End: 2020-01-01 | Stop reason: HOSPADM

## 2020-01-01 RX ORDER — SODIUM CHLORIDE, SODIUM LACTATE, POTASSIUM CHLORIDE, CALCIUM CHLORIDE 600; 310; 30; 20 MG/100ML; MG/100ML; MG/100ML; MG/100ML
1000 INJECTION, SOLUTION INTRAVENOUS CONTINUOUS
Status: DISPENSED | OUTPATIENT
Start: 2020-01-01 | End: 2020-01-01

## 2020-01-01 RX ORDER — LIDOCAINE 50 MG/G
OINTMENT TOPICAL AS NEEDED
Status: DISCONTINUED | OUTPATIENT
Start: 2020-01-01 | End: 2020-01-01 | Stop reason: HOSPADM

## 2020-01-01 RX ORDER — LABETALOL HYDROCHLORIDE 5 MG/ML
10 INJECTION, SOLUTION INTRAVENOUS ONCE
Status: COMPLETED | OUTPATIENT
Start: 2020-01-01 | End: 2020-01-01

## 2020-01-01 RX ORDER — SODIUM CHLORIDE 0.9 % (FLUSH) 0.9 %
10 SYRINGE (ML) INJECTION AS NEEDED
Status: DISCONTINUED | OUTPATIENT
Start: 2020-01-01 | End: 2020-01-01 | Stop reason: HOSPADM

## 2020-01-01 RX ORDER — DEXTROSE MONOHYDRATE 25 G/50ML
25 INJECTION, SOLUTION INTRAVENOUS
Status: DISCONTINUED | OUTPATIENT
Start: 2020-01-01 | End: 2020-01-01 | Stop reason: HOSPADM

## 2020-01-01 RX ORDER — ONDANSETRON 2 MG/ML
4 INJECTION INTRAMUSCULAR; INTRAVENOUS EVERY 6 HOURS PRN
Status: DISCONTINUED | OUTPATIENT
Start: 2020-01-01 | End: 2020-01-01 | Stop reason: HOSPADM

## 2020-01-01 RX ORDER — PROMETHAZINE HYDROCHLORIDE 25 MG/1
25 SUPPOSITORY RECTAL ONCE AS NEEDED
Status: DISCONTINUED | OUTPATIENT
Start: 2020-01-01 | End: 2020-01-01 | Stop reason: SDUPTHER

## 2020-01-01 RX ORDER — NALOXONE HCL 0.4 MG/ML
0.4 VIAL (ML) INJECTION AS NEEDED
Status: DISCONTINUED | OUTPATIENT
Start: 2020-01-01 | End: 2020-01-01 | Stop reason: HOSPADM

## 2020-01-01 RX ORDER — GLYCOPYRROLATE 1 MG/5 ML
SYRINGE (ML) INTRAVENOUS AS NEEDED
Status: DISCONTINUED | OUTPATIENT
Start: 2020-01-01 | End: 2020-01-01 | Stop reason: SURG

## 2020-01-01 RX ORDER — MEPERIDINE HYDROCHLORIDE 25 MG/ML
12.5 INJECTION INTRAMUSCULAR; INTRAVENOUS; SUBCUTANEOUS
Status: DISCONTINUED | OUTPATIENT
Start: 2020-01-01 | End: 2020-01-01 | Stop reason: HOSPADM

## 2020-01-01 RX ORDER — MIDAZOLAM HYDROCHLORIDE 1 MG/ML
INJECTION INTRAMUSCULAR; INTRAVENOUS AS NEEDED
Status: DISCONTINUED | OUTPATIENT
Start: 2020-01-01 | End: 2020-01-01 | Stop reason: SURG

## 2020-01-01 RX ORDER — ONDANSETRON 2 MG/ML
INJECTION INTRAMUSCULAR; INTRAVENOUS AS NEEDED
Status: DISCONTINUED | OUTPATIENT
Start: 2020-01-01 | End: 2020-01-01 | Stop reason: SURG

## 2020-01-01 RX ORDER — EPINEPHRINE 0.1 MG/ML
SYRINGE (ML) INJECTION
Status: COMPLETED | OUTPATIENT
Start: 2020-01-01 | End: 2020-01-01

## 2020-01-01 RX ORDER — FERROUS SULFATE TAB EC 324 MG (65 MG FE EQUIVALENT) 324 (65 FE) MG
324 TABLET DELAYED RESPONSE ORAL 2 TIMES DAILY WITH MEALS
Status: DISCONTINUED | OUTPATIENT
Start: 2020-01-01 | End: 2020-01-01 | Stop reason: HOSPADM

## 2020-01-01 RX ORDER — HYDROMORPHONE HCL 110MG/55ML
0.5 PATIENT CONTROLLED ANALGESIA SYRINGE INTRAVENOUS
Status: DISCONTINUED | OUTPATIENT
Start: 2020-01-01 | End: 2020-01-01 | Stop reason: HOSPADM

## 2020-01-01 RX ORDER — ATORVASTATIN CALCIUM 40 MG/1
TABLET, FILM COATED ORAL
Qty: 30 TABLET | Refills: 0 | OUTPATIENT
Start: 2020-01-01

## 2020-01-01 RX ORDER — FERROUS SULFATE TAB EC 324 MG (65 MG FE EQUIVALENT) 324 (65 FE) MG
324 TABLET DELAYED RESPONSE ORAL 2 TIMES DAILY WITH MEALS
Qty: 30 TABLET | Refills: 0 | Status: SHIPPED | OUTPATIENT
Start: 2020-01-01

## 2020-01-01 RX ORDER — ALPRAZOLAM 1 MG/1
1 TABLET ORAL NIGHTLY
Qty: 30 TABLET | Refills: 0 | Status: SHIPPED | OUTPATIENT
Start: 2020-01-01 | End: 2020-01-01

## 2020-01-01 RX ORDER — HYDROCODONE BITARTRATE AND ACETAMINOPHEN 7.5; 325 MG/1; MG/1
1 TABLET ORAL EVERY 6 HOURS PRN
Status: DISCONTINUED | OUTPATIENT
Start: 2020-01-01 | End: 2020-01-01 | Stop reason: HOSPADM

## 2020-01-01 RX ORDER — SODIUM CHLORIDE 0.9 % (FLUSH) 0.9 %
10 SYRINGE (ML) INJECTION AS NEEDED
Status: CANCELLED | OUTPATIENT
Start: 2020-01-01

## 2020-01-01 RX ORDER — AMOXICILLIN 250 MG
2 CAPSULE ORAL 2 TIMES DAILY PRN
Status: DISCONTINUED | OUTPATIENT
Start: 2020-01-01 | End: 2020-01-01 | Stop reason: HOSPADM

## 2020-01-01 RX ORDER — DIPHENHYDRAMINE HCL 25 MG
25 TABLET ORAL
Status: DISCONTINUED | OUTPATIENT
Start: 2020-01-01 | End: 2020-01-01 | Stop reason: HOSPADM

## 2020-01-01 RX ORDER — NALOXONE HCL 0.4 MG/ML
0.4 VIAL (ML) INJECTION
Status: DISCONTINUED | OUTPATIENT
Start: 2020-01-01 | End: 2020-01-01 | Stop reason: HOSPADM

## 2020-01-01 RX ORDER — PROMETHAZINE HYDROCHLORIDE 25 MG/ML
12.5 INJECTION, SOLUTION INTRAMUSCULAR; INTRAVENOUS ONCE AS NEEDED
Status: DISCONTINUED | OUTPATIENT
Start: 2020-01-01 | End: 2020-01-01 | Stop reason: HOSPADM

## 2020-01-01 RX ORDER — LOSARTAN POTASSIUM 50 MG/1
100 TABLET ORAL DAILY
Status: DISCONTINUED | OUTPATIENT
Start: 2020-01-01 | End: 2020-01-01 | Stop reason: HOSPADM

## 2020-01-01 RX ORDER — ZOLPIDEM TARTRATE 5 MG/1
5 TABLET ORAL NIGHTLY PRN
Status: DISCONTINUED | OUTPATIENT
Start: 2020-01-01 | End: 2020-01-01 | Stop reason: HOSPADM

## 2020-01-01 RX ORDER — LABETALOL HYDROCHLORIDE 5 MG/ML
5 INJECTION, SOLUTION INTRAVENOUS
Status: DISCONTINUED | OUTPATIENT
Start: 2020-01-01 | End: 2020-01-01 | Stop reason: HOSPADM

## 2020-01-01 RX ORDER — LIDOCAINE HYDROCHLORIDE 20 MG/ML
INJECTION, SOLUTION EPIDURAL; INFILTRATION; INTRACAUDAL; PERINEURAL AS NEEDED
Status: DISCONTINUED | OUTPATIENT
Start: 2020-01-01 | End: 2020-01-01 | Stop reason: SURG

## 2020-01-01 RX ORDER — HYDROMORPHONE HCL 110MG/55ML
1 PATIENT CONTROLLED ANALGESIA SYRINGE INTRAVENOUS
Status: DISCONTINUED | OUTPATIENT
Start: 2020-01-01 | End: 2020-01-01 | Stop reason: HOSPADM

## 2020-01-01 RX ORDER — CLOPIDOGREL BISULFATE 75 MG/1
75 TABLET ORAL DAILY
COMMUNITY

## 2020-01-01 RX ORDER — DEXTROMETHORPHAN HYDROBROMIDE AND PROMETHAZINE HYDROCHLORIDE 15; 6.25 MG/5ML; MG/5ML
SYRUP ORAL
COMMUNITY
Start: 2019-01-01 | End: 2020-01-01

## 2020-01-01 RX ORDER — ALPRAZOLAM 1 MG/1
1 TABLET ORAL NIGHTLY
Status: DISCONTINUED | OUTPATIENT
Start: 2020-01-01 | End: 2020-01-01 | Stop reason: HOSPADM

## 2020-01-01 RX ORDER — SODIUM CHLORIDE AND POTASSIUM CHLORIDE 150; 450 MG/100ML; MG/100ML
100 INJECTION, SOLUTION INTRAVENOUS CONTINUOUS
Status: DISCONTINUED | OUTPATIENT
Start: 2020-01-01 | End: 2020-01-01 | Stop reason: HOSPADM

## 2020-01-01 RX ORDER — SODIUM CHLORIDE 0.9 % (FLUSH) 0.9 %
3 SYRINGE (ML) INJECTION EVERY 12 HOURS SCHEDULED
Status: DISCONTINUED | OUTPATIENT
Start: 2020-01-01 | End: 2020-01-01 | Stop reason: HOSPADM

## 2020-01-01 RX ORDER — PROMETHAZINE HYDROCHLORIDE 25 MG/1
25 SUPPOSITORY RECTAL ONCE AS NEEDED
Status: DISCONTINUED | OUTPATIENT
Start: 2020-01-01 | End: 2020-01-01 | Stop reason: HOSPADM

## 2020-01-01 RX ORDER — KETOROLAC TROMETHAMINE 30 MG/ML
30 INJECTION, SOLUTION INTRAMUSCULAR; INTRAVENOUS ONCE
Status: COMPLETED | OUTPATIENT
Start: 2020-01-01 | End: 2020-01-01

## 2020-01-01 RX ORDER — ROCURONIUM BROMIDE 10 MG/ML
INJECTION, SOLUTION INTRAVENOUS AS NEEDED
Status: DISCONTINUED | OUTPATIENT
Start: 2020-01-01 | End: 2020-01-01 | Stop reason: SURG

## 2020-01-01 RX ORDER — FENTANYL CITRATE 50 UG/ML
50 INJECTION, SOLUTION INTRAMUSCULAR; INTRAVENOUS
Status: DISCONTINUED | OUTPATIENT
Start: 2020-01-01 | End: 2020-01-01 | Stop reason: HOSPADM

## 2020-01-01 RX ORDER — MORPHINE SULFATE 4 MG/ML
2 INJECTION, SOLUTION INTRAMUSCULAR; INTRAVENOUS
Status: DISPENSED | OUTPATIENT
Start: 2020-01-01 | End: 2020-01-01

## 2020-01-01 RX ORDER — DIPHENHYDRAMINE HYDROCHLORIDE 50 MG/ML
12.5 INJECTION INTRAMUSCULAR; INTRAVENOUS
Status: DISCONTINUED | OUTPATIENT
Start: 2020-01-01 | End: 2020-01-01 | Stop reason: HOSPADM

## 2020-01-01 RX ORDER — DEXTROSE MONOHYDRATE 25 G/50ML
25-50 INJECTION, SOLUTION INTRAVENOUS
Status: DISCONTINUED | OUTPATIENT
Start: 2020-01-01 | End: 2020-01-01 | Stop reason: HOSPADM

## 2020-01-01 RX ORDER — MIRTAZAPINE 15 MG/1
15 TABLET, FILM COATED ORAL NIGHTLY
Status: DISCONTINUED | OUTPATIENT
Start: 2020-01-01 | End: 2020-01-01 | Stop reason: HOSPADM

## 2020-01-01 RX ORDER — SODIUM CHLORIDE 0.9 % (FLUSH) 0.9 %
10 SYRINGE (ML) INJECTION EVERY 12 HOURS SCHEDULED
Status: DISCONTINUED | OUTPATIENT
Start: 2020-01-01 | End: 2020-01-01 | Stop reason: HOSPADM

## 2020-01-01 RX ORDER — PROMETHAZINE HYDROCHLORIDE 25 MG/1
25 TABLET ORAL ONCE AS NEEDED
Status: DISCONTINUED | OUTPATIENT
Start: 2020-01-01 | End: 2020-01-01 | Stop reason: SDUPTHER

## 2020-01-01 RX ORDER — DEXAMETHASONE SODIUM PHOSPHATE 4 MG/ML
INJECTION, SOLUTION INTRA-ARTICULAR; INTRALESIONAL; INTRAMUSCULAR; INTRAVENOUS; SOFT TISSUE AS NEEDED
Status: DISCONTINUED | OUTPATIENT
Start: 2020-01-01 | End: 2020-01-01 | Stop reason: SURG

## 2020-01-01 RX ORDER — ONDANSETRON 2 MG/ML
4 INJECTION INTRAMUSCULAR; INTRAVENOUS ONCE AS NEEDED
Status: DISCONTINUED | OUTPATIENT
Start: 2020-01-01 | End: 2020-01-01 | Stop reason: HOSPADM

## 2020-01-01 RX ORDER — SODIUM CHLORIDE 9 MG/ML
INJECTION, SOLUTION INTRAVENOUS CONTINUOUS PRN
Status: DISCONTINUED | OUTPATIENT
Start: 2020-01-01 | End: 2020-01-01 | Stop reason: SURG

## 2020-01-01 RX ORDER — HYDROMORPHONE HCL 110MG/55ML
0.5 PATIENT CONTROLLED ANALGESIA SYRINGE INTRAVENOUS
Status: DISCONTINUED | OUTPATIENT
Start: 2020-01-01 | End: 2020-01-01

## 2020-01-01 RX ORDER — FAMOTIDINE 20 MG/1
20 TABLET, FILM COATED ORAL DAILY
Status: DISCONTINUED | OUTPATIENT
Start: 2020-01-01 | End: 2020-01-01 | Stop reason: HOSPADM

## 2020-01-01 RX ORDER — PANTOPRAZOLE SODIUM 40 MG/1
40 TABLET, DELAYED RELEASE ORAL
Status: DISCONTINUED | OUTPATIENT
Start: 2020-01-01 | End: 2020-01-01 | Stop reason: HOSPADM

## 2020-01-01 RX ORDER — ATENOLOL 25 MG/1
TABLET ORAL
Qty: 180 TABLET | Refills: 1 | Status: SHIPPED | OUTPATIENT
Start: 2020-01-01 | End: 2020-01-01

## 2020-01-01 RX ORDER — NALOXONE HCL 0.4 MG/ML
0.4 VIAL (ML) INJECTION
Status: DISCONTINUED | OUTPATIENT
Start: 2020-01-01 | End: 2020-01-01

## 2020-01-01 RX ORDER — NIACIN 500 MG/1
1 TABLET, EXTENDED RELEASE ORAL DAILY
COMMUNITY
Start: 2020-01-01

## 2020-01-01 RX ORDER — PROMETHAZINE HYDROCHLORIDE 25 MG/1
25 TABLET ORAL ONCE AS NEEDED
Status: DISCONTINUED | OUTPATIENT
Start: 2020-01-01 | End: 2020-01-01 | Stop reason: HOSPADM

## 2020-01-01 RX ORDER — DEXAMETHASONE 4 MG/1
4 TABLET ORAL EVERY 12 HOURS SCHEDULED
Status: DISCONTINUED | OUTPATIENT
Start: 2020-01-01 | End: 2020-01-01 | Stop reason: HOSPADM

## 2020-01-01 RX ORDER — FINASTERIDE 5 MG/1
5 TABLET, FILM COATED ORAL NIGHTLY
Status: DISCONTINUED | OUTPATIENT
Start: 2020-01-01 | End: 2020-01-01 | Stop reason: HOSPADM

## 2020-01-01 RX ORDER — PHENYLEPHRINE HCL IN 0.9% NACL 0.5 MG/5ML
SYRINGE (ML) INTRAVENOUS AS NEEDED
Status: DISCONTINUED | OUTPATIENT
Start: 2020-01-01 | End: 2020-01-01 | Stop reason: SURG

## 2020-01-01 RX ORDER — LIDOCAINE 50 MG/G
OINTMENT TOPICAL
Status: DISPENSED
Start: 2020-01-01 | End: 2020-01-01

## 2020-01-01 RX ORDER — BENZONATATE 100 MG/1
1 CAPSULE ORAL 3 TIMES DAILY
COMMUNITY
Start: 2019-01-01 | End: 2020-01-01

## 2020-01-01 RX ORDER — NICOTINE POLACRILEX 4 MG
15 LOZENGE BUCCAL
Status: DISCONTINUED | OUTPATIENT
Start: 2020-01-01 | End: 2020-01-01 | Stop reason: HOSPADM

## 2020-01-01 RX ORDER — CALCIUM CARBONATE 200(500)MG
2 TABLET,CHEWABLE ORAL 2 TIMES DAILY PRN
Status: DISCONTINUED | OUTPATIENT
Start: 2020-01-01 | End: 2020-01-01 | Stop reason: HOSPADM

## 2020-01-01 RX ORDER — SODIUM CHLORIDE 0.9 % (FLUSH) 0.9 %
10 SYRINGE (ML) INJECTION EVERY 12 HOURS SCHEDULED
Status: CANCELLED | OUTPATIENT
Start: 2020-01-01

## 2020-01-01 RX ORDER — PROMETHAZINE HYDROCHLORIDE 25 MG/ML
6.25 INJECTION, SOLUTION INTRAMUSCULAR; INTRAVENOUS ONCE AS NEEDED
Status: DISCONTINUED | OUTPATIENT
Start: 2020-01-01 | End: 2020-01-01 | Stop reason: HOSPADM

## 2020-01-01 RX ORDER — EPINEPHRINE 0.1 MG/ML
SYRINGE (ML) INJECTION
Status: DISCONTINUED
Start: 2020-01-01 | End: 2020-01-01 | Stop reason: WASHOUT

## 2020-01-01 RX ORDER — EPHEDRINE SULFATE/0.9% NACL/PF 25 MG/5 ML
SYRINGE (ML) INTRAVENOUS AS NEEDED
Status: DISCONTINUED | OUTPATIENT
Start: 2020-01-01 | End: 2020-01-01 | Stop reason: SURG

## 2020-01-01 RX ORDER — ATENOLOL 25 MG/1
25 TABLET ORAL 2 TIMES DAILY
COMMUNITY

## 2020-01-01 RX ORDER — VECURONIUM BROMIDE 1 MG/ML
INJECTION, POWDER, LYOPHILIZED, FOR SOLUTION INTRAVENOUS AS NEEDED
Status: DISCONTINUED | OUTPATIENT
Start: 2020-01-01 | End: 2020-01-01 | Stop reason: SURG

## 2020-01-01 RX ORDER — DEXAMETHASONE 4 MG/1
4 TABLET ORAL EVERY 12 HOURS SCHEDULED
Qty: 60 TABLET | Refills: 0 | Status: SHIPPED | OUTPATIENT
Start: 2020-01-01

## 2020-01-01 RX ORDER — METFORMIN HYDROCHLORIDE 500 MG/1
2000 TABLET, EXTENDED RELEASE ORAL
Status: DISCONTINUED | OUTPATIENT
Start: 2020-01-01 | End: 2020-01-01 | Stop reason: HOSPADM

## 2020-01-01 RX ORDER — FENTANYL CITRATE 50 UG/ML
INJECTION, SOLUTION INTRAMUSCULAR; INTRAVENOUS AS NEEDED
Status: DISCONTINUED | OUTPATIENT
Start: 2020-01-01 | End: 2020-01-01 | Stop reason: SURG

## 2020-01-01 RX ORDER — SODIUM CHLORIDE 9 MG/ML
9 INJECTION, SOLUTION INTRAVENOUS CONTINUOUS PRN
Status: CANCELLED | OUTPATIENT
Start: 2020-01-01

## 2020-01-01 RX ORDER — GLIPIZIDE 5 MG/1
10 TABLET ORAL
Status: DISCONTINUED | OUTPATIENT
Start: 2020-01-01 | End: 2020-01-01 | Stop reason: HOSPADM

## 2020-01-01 RX ORDER — ALBUMIN, HUMAN INJ 5% 5 %
SOLUTION INTRAVENOUS CONTINUOUS PRN
Status: DISCONTINUED | OUTPATIENT
Start: 2020-01-01 | End: 2020-01-01 | Stop reason: SURG

## 2020-01-01 RX ORDER — PROPOFOL 10 MG/ML
VIAL (ML) INTRAVENOUS AS NEEDED
Status: DISCONTINUED | OUTPATIENT
Start: 2020-01-01 | End: 2020-01-01 | Stop reason: SURG

## 2020-01-01 RX ORDER — NITROGLYCERIN 0.4 MG/1
TABLET SUBLINGUAL
Qty: 25 TABLET | Refills: 0 | Status: SHIPPED | OUTPATIENT
Start: 2020-01-01

## 2020-01-01 RX ORDER — ASPIRIN 81 MG/1
81 TABLET ORAL DAILY
COMMUNITY

## 2020-01-01 RX ORDER — SUCCINYLCHOLINE CHLORIDE 20 MG/ML
INJECTION INTRAMUSCULAR; INTRAVENOUS AS NEEDED
Status: DISCONTINUED | OUTPATIENT
Start: 2020-01-01 | End: 2020-01-01 | Stop reason: SURG

## 2020-01-01 RX ORDER — ISOSORBIDE MONONITRATE 60 MG/1
TABLET, EXTENDED RELEASE ORAL
Qty: 90 TABLET | Refills: 1 | Status: SHIPPED | OUTPATIENT
Start: 2020-01-01 | End: 2020-01-01

## 2020-01-01 RX ORDER — CLOPIDOGREL BISULFATE 75 MG/1
75 TABLET ORAL DAILY
Status: DISCONTINUED | OUTPATIENT
Start: 2020-01-01 | End: 2020-01-01

## 2020-01-01 RX ORDER — PREGABALIN 100 MG/1
100 CAPSULE ORAL EVERY 12 HOURS SCHEDULED
Status: DISCONTINUED | OUTPATIENT
Start: 2020-01-01 | End: 2020-01-01 | Stop reason: HOSPADM

## 2020-01-01 RX ORDER — CLOPIDOGREL BISULFATE 75 MG/1
75 TABLET ORAL DAILY
COMMUNITY
End: 2020-01-01 | Stop reason: HOSPADM

## 2020-01-01 RX ORDER — LIDOCAINE HYDROCHLORIDE 10 MG/ML
0.5 INJECTION, SOLUTION INFILTRATION; PERINEURAL ONCE AS NEEDED
Status: DISCONTINUED | OUTPATIENT
Start: 2020-01-01 | End: 2020-01-01 | Stop reason: HOSPADM

## 2020-01-01 RX ORDER — HYDRALAZINE HYDROCHLORIDE 20 MG/ML
INJECTION INTRAMUSCULAR; INTRAVENOUS AS NEEDED
Status: DISCONTINUED | OUTPATIENT
Start: 2020-01-01 | End: 2020-01-01 | Stop reason: SURG

## 2020-01-01 RX ORDER — IPRATROPIUM BROMIDE AND ALBUTEROL SULFATE 2.5; .5 MG/3ML; MG/3ML
3 SOLUTION RESPIRATORY (INHALATION) ONCE AS NEEDED
Status: DISCONTINUED | OUTPATIENT
Start: 2020-01-01 | End: 2020-01-01 | Stop reason: HOSPADM

## 2020-01-01 RX ADMIN — Medication 10 ML: at 20:45

## 2020-01-01 RX ADMIN — INSULIN LISPRO 5 UNITS: 100 INJECTION, SOLUTION INTRAVENOUS; SUBCUTANEOUS at 17:27

## 2020-01-01 RX ADMIN — MIDAZOLAM 2 MG: 1 INJECTION INTRAMUSCULAR; INTRAVENOUS at 11:45

## 2020-01-01 RX ADMIN — ROSUVASTATIN CALCIUM 10 MG: 10 TABLET, FILM COATED ORAL at 08:53

## 2020-01-01 RX ADMIN — SERTRALINE HYDROCHLORIDE 25 MG: 50 TABLET ORAL at 08:02

## 2020-01-01 RX ADMIN — AMPICILLIN AND SULBACTAM 3 G: 1; 2 INJECTION, POWDER, FOR SOLUTION INTRAMUSCULAR; INTRAVENOUS at 03:24

## 2020-01-01 RX ADMIN — ATENOLOL 25 MG: 25 TABLET ORAL at 20:49

## 2020-01-01 RX ADMIN — ATENOLOL 25 MG: 25 TABLET ORAL at 09:10

## 2020-01-01 RX ADMIN — INSULIN LISPRO 3 UNITS: 100 INJECTION, SOLUTION INTRAVENOUS; SUBCUTANEOUS at 18:12

## 2020-01-01 RX ADMIN — FENTANYL CITRATE 100 MCG: 50 INJECTION, SOLUTION INTRAMUSCULAR; INTRAVENOUS at 11:46

## 2020-01-01 RX ADMIN — PROPOFOL 50 MG: 10 INJECTION, EMULSION INTRAVENOUS at 12:31

## 2020-01-01 RX ADMIN — ATENOLOL 25 MG: 25 TABLET ORAL at 08:52

## 2020-01-01 RX ADMIN — ISOSORBIDE MONONITRATE 60 MG: 60 TABLET, EXTENDED RELEASE ORAL at 12:25

## 2020-01-01 RX ADMIN — AMPICILLIN AND SULBACTAM 3 G: 1; 2 INJECTION, POWDER, FOR SOLUTION INTRAMUSCULAR; INTRAVENOUS at 20:50

## 2020-01-01 RX ADMIN — Medication 3 ML: at 20:54

## 2020-01-01 RX ADMIN — DEXAMETHASONE 4 MG: 4 TABLET ORAL at 09:29

## 2020-01-01 RX ADMIN — HYDROCODONE BITARTRATE AND ACETAMINOPHEN 1 TABLET: 10; 325 TABLET ORAL at 13:54

## 2020-01-01 RX ADMIN — Medication 10 ML: at 22:16

## 2020-01-01 RX ADMIN — FERROUS SULFATE TAB EC 324 MG (65 MG FE EQUIVALENT) 324 MG: 324 (65 FE) TABLET DELAYED RESPONSE at 09:29

## 2020-01-01 RX ADMIN — PREGABALIN 100 MG: 100 CAPSULE ORAL at 08:52

## 2020-01-01 RX ADMIN — AMPICILLIN AND SULBACTAM 3 G: 1; 2 INJECTION, POWDER, FOR SOLUTION INTRAMUSCULAR; INTRAVENOUS at 19:56

## 2020-01-01 RX ADMIN — IOPAMIDOL 85 ML: 755 INJECTION, SOLUTION INTRAVENOUS at 20:58

## 2020-01-01 RX ADMIN — ATENOLOL 25 MG: 25 TABLET ORAL at 04:31

## 2020-01-01 RX ADMIN — PHENYLEPHRINE HYDROCHLORIDE 200 MCG: 10 INJECTION INTRAVENOUS at 11:55

## 2020-01-01 RX ADMIN — FINASTERIDE 5 MG: 5 TABLET, FILM COATED ORAL at 20:38

## 2020-01-01 RX ADMIN — FAMOTIDINE 20 MG: 20 TABLET ORAL at 08:09

## 2020-01-01 RX ADMIN — CLOPIDOGREL BISULFATE 75 MG: 75 TABLET ORAL at 08:09

## 2020-01-01 RX ADMIN — LOSARTAN POTASSIUM 100 MG: 50 TABLET, FILM COATED ORAL at 09:38

## 2020-01-01 RX ADMIN — LOSARTAN POTASSIUM 100 MG: 50 TABLET, FILM COATED ORAL at 08:01

## 2020-01-01 RX ADMIN — PHENYLEPHRINE HYDROCHLORIDE 100 MCG: 10 INJECTION INTRAVENOUS at 17:16

## 2020-01-01 RX ADMIN — ASPIRIN 81 MG: 81 TABLET, COATED ORAL at 09:40

## 2020-01-01 RX ADMIN — Medication 0.2 MG: at 08:41

## 2020-01-01 RX ADMIN — INSULIN HUMAN 4 UNITS: 100 INJECTION, SOLUTION PARENTERAL at 21:31

## 2020-01-01 RX ADMIN — DEXAMETHASONE 4 MG: 4 TABLET ORAL at 10:12

## 2020-01-01 RX ADMIN — ROSUVASTATIN CALCIUM 10 MG: 10 TABLET, FILM COATED ORAL at 08:52

## 2020-01-01 RX ADMIN — ALBUMIN HUMAN: 0.05 INJECTION, SOLUTION INTRAVENOUS at 14:43

## 2020-01-01 RX ADMIN — FERROUS SULFATE TAB EC 324 MG (65 MG FE EQUIVALENT) 324 MG: 324 (65 FE) TABLET DELAYED RESPONSE at 17:30

## 2020-01-01 RX ADMIN — HYDROCODONE BITARTRATE AND ACETAMINOPHEN 1 TABLET: 10; 325 TABLET ORAL at 04:31

## 2020-01-01 RX ADMIN — AMPICILLIN AND SULBACTAM 3 G: 1; 2 INJECTION, POWDER, FOR SOLUTION INTRAMUSCULAR; INTRAVENOUS at 15:23

## 2020-01-01 RX ADMIN — INSULIN LISPRO 2 UNITS: 100 INJECTION, SOLUTION INTRAVENOUS; SUBCUTANEOUS at 08:52

## 2020-01-01 RX ADMIN — PREGABALIN 100 MG: 100 CAPSULE ORAL at 21:48

## 2020-01-01 RX ADMIN — GLIPIZIDE 10 MG: 5 TABLET ORAL at 07:45

## 2020-01-01 RX ADMIN — ATENOLOL 25 MG: 25 TABLET ORAL at 20:01

## 2020-01-01 RX ADMIN — GLIPIZIDE 10 MG: 5 TABLET ORAL at 12:40

## 2020-01-01 RX ADMIN — AMPICILLIN AND SULBACTAM 3 G: 1; 2 INJECTION, POWDER, FOR SOLUTION INTRAMUSCULAR; INTRAVENOUS at 02:56

## 2020-01-01 RX ADMIN — FERROUS SULFATE TAB EC 324 MG (65 MG FE EQUIVALENT) 324 MG: 324 (65 FE) TABLET DELAYED RESPONSE at 17:25

## 2020-01-01 RX ADMIN — FERROUS SULFATE TAB EC 324 MG (65 MG FE EQUIVALENT) 324 MG: 324 (65 FE) TABLET DELAYED RESPONSE at 09:47

## 2020-01-01 RX ADMIN — GLIPIZIDE 5 MG: 5 TABLET ORAL at 08:52

## 2020-01-01 RX ADMIN — Medication 10 ML: at 22:11

## 2020-01-01 RX ADMIN — ROSUVASTATIN CALCIUM 10 MG: 10 TABLET, FILM COATED ORAL at 08:09

## 2020-01-01 RX ADMIN — FINASTERIDE 5 MG: 5 TABLET, FILM COATED ORAL at 01:33

## 2020-01-01 RX ADMIN — ATENOLOL 25 MG: 25 TABLET ORAL at 20:06

## 2020-01-01 RX ADMIN — ATENOLOL 25 MG: 25 TABLET ORAL at 07:44

## 2020-01-01 RX ADMIN — HYDROCODONE BITARTRATE AND ACETAMINOPHEN 1 TABLET: 7.5; 325 TABLET ORAL at 14:00

## 2020-01-01 RX ADMIN — ATENOLOL 25 MG: 25 TABLET ORAL at 21:31

## 2020-01-01 RX ADMIN — Medication 10 MG: at 14:39

## 2020-01-01 RX ADMIN — GLIPIZIDE 5 MG: 5 TABLET ORAL at 09:10

## 2020-01-01 RX ADMIN — AMPICILLIN AND SULBACTAM 3 G: 1; 2 INJECTION, POWDER, FOR SOLUTION INTRAMUSCULAR; INTRAVENOUS at 14:52

## 2020-01-01 RX ADMIN — HYDROCODONE BITARTRATE AND ACETAMINOPHEN 1 TABLET: 7.5; 325 TABLET ORAL at 12:44

## 2020-01-01 RX ADMIN — INSULIN HUMAN 5 UNITS: 100 INJECTION, SOLUTION PARENTERAL at 11:08

## 2020-01-01 RX ADMIN — ATENOLOL 25 MG: 25 TABLET ORAL at 08:11

## 2020-01-01 RX ADMIN — FERROUS SULFATE TAB EC 324 MG (65 MG FE EQUIVALENT) 324 MG: 324 (65 FE) TABLET DELAYED RESPONSE at 09:10

## 2020-01-01 RX ADMIN — FAMOTIDINE 20 MG: 20 TABLET ORAL at 07:46

## 2020-01-01 RX ADMIN — PHENYLEPHRINE HYDROCHLORIDE 200 MCG: 10 INJECTION INTRAVENOUS at 11:52

## 2020-01-01 RX ADMIN — ALPRAZOLAM 1 MG: 1 TABLET ORAL at 20:58

## 2020-01-01 RX ADMIN — DEXAMETHASONE 4 MG: 4 TABLET ORAL at 12:24

## 2020-01-01 RX ADMIN — AMLODIPINE BESYLATE 10 MG: 5 TABLET ORAL at 07:42

## 2020-01-01 RX ADMIN — INSULIN LISPRO 4 UNITS: 100 INJECTION, SOLUTION INTRAVENOUS; SUBCUTANEOUS at 12:08

## 2020-01-01 RX ADMIN — ATENOLOL 25 MG: 25 TABLET ORAL at 20:44

## 2020-01-01 RX ADMIN — DEXAMETHASONE 4 MG: 4 TABLET ORAL at 07:46

## 2020-01-01 RX ADMIN — HYDROCODONE BITARTRATE AND ACETAMINOPHEN 1 TABLET: 10; 325 TABLET ORAL at 09:48

## 2020-01-01 RX ADMIN — ISOSORBIDE MONONITRATE 60 MG: 60 TABLET, EXTENDED RELEASE ORAL at 08:53

## 2020-01-01 RX ADMIN — VECURONIUM BROMIDE 2 MG: 10 INJECTION, POWDER, LYOPHILIZED, FOR SOLUTION INTRAVENOUS at 09:23

## 2020-01-01 RX ADMIN — HYDROMORPHONE HYDROCHLORIDE 0.5 MG: 2 INJECTION, SOLUTION INTRAMUSCULAR; INTRAVENOUS; SUBCUTANEOUS at 18:35

## 2020-01-01 RX ADMIN — GLIPIZIDE 10 MG: 5 TABLET ORAL at 08:02

## 2020-01-01 RX ADMIN — Medication 10 ML: at 07:47

## 2020-01-01 RX ADMIN — INSULIN LISPRO 10 UNITS: 100 INJECTION, SOLUTION INTRAVENOUS; SUBCUTANEOUS at 13:21

## 2020-01-01 RX ADMIN — Medication 3 ML: at 21:57

## 2020-01-01 RX ADMIN — MIRTAZAPINE 15 MG: 15 TABLET, FILM COATED ORAL at 20:38

## 2020-01-01 RX ADMIN — Medication 10 ML: at 22:23

## 2020-01-01 RX ADMIN — HYDROCODONE BITARTRATE AND ACETAMINOPHEN 1 TABLET: 10; 325 TABLET ORAL at 05:02

## 2020-01-01 RX ADMIN — FERROUS SULFATE TAB EC 324 MG (65 MG FE EQUIVALENT) 324 MG: 324 (65 FE) TABLET DELAYED RESPONSE at 07:42

## 2020-01-01 RX ADMIN — ISOSORBIDE MONONITRATE 60 MG: 60 TABLET, EXTENDED RELEASE ORAL at 08:10

## 2020-01-01 RX ADMIN — FINASTERIDE 5 MG: 5 TABLET, FILM COATED ORAL at 21:49

## 2020-01-01 RX ADMIN — LOSARTAN POTASSIUM 100 MG: 50 TABLET, FILM COATED ORAL at 08:52

## 2020-01-01 RX ADMIN — FERROUS SULFATE TAB EC 324 MG (65 MG FE EQUIVALENT) 324 MG: 324 (65 FE) TABLET DELAYED RESPONSE at 12:40

## 2020-01-01 RX ADMIN — SODIUM CHLORIDE, SODIUM LACTATE, POTASSIUM CHLORIDE, AND CALCIUM CHLORIDE: 600; 310; 30; 20 INJECTION, SOLUTION INTRAVENOUS at 15:08

## 2020-01-01 RX ADMIN — METFORMIN ER 500 MG 2000 MG: 500 TABLET ORAL at 17:05

## 2020-01-01 RX ADMIN — Medication 3 ML: at 20:39

## 2020-01-01 RX ADMIN — FINASTERIDE 5 MG: 5 TABLET, FILM COATED ORAL at 20:06

## 2020-01-01 RX ADMIN — ENOXAPARIN SODIUM 40 MG: 40 INJECTION SUBCUTANEOUS at 17:15

## 2020-01-01 RX ADMIN — DEXAMETHASONE 4 MG: 4 TABLET ORAL at 20:50

## 2020-01-01 RX ADMIN — HYDROMORPHONE HYDROCHLORIDE 0.5 MG: 2 INJECTION, SOLUTION INTRAMUSCULAR; INTRAVENOUS; SUBCUTANEOUS at 18:04

## 2020-01-01 RX ADMIN — ATENOLOL 25 MG: 25 TABLET ORAL at 01:33

## 2020-01-01 RX ADMIN — AMLODIPINE BESYLATE 10 MG: 5 TABLET ORAL at 12:24

## 2020-01-01 RX ADMIN — Medication 0.2 MG: at 11:47

## 2020-01-01 RX ADMIN — ROCURONIUM BROMIDE 50 MG: 10 INJECTION, SOLUTION INTRAVENOUS at 08:27

## 2020-01-01 RX ADMIN — DEXAMETHASONE 4 MG: 4 TABLET ORAL at 21:31

## 2020-01-01 RX ADMIN — ALBUMIN HUMAN: 0.05 INJECTION, SOLUTION INTRAVENOUS at 15:13

## 2020-01-01 RX ADMIN — ISOSORBIDE MONONITRATE 60 MG: 60 TABLET, EXTENDED RELEASE ORAL at 08:52

## 2020-01-01 RX ADMIN — HYDROCODONE BITARTRATE AND ACETAMINOPHEN 1 TABLET: 10; 325 TABLET ORAL at 20:06

## 2020-01-01 RX ADMIN — ATENOLOL 25 MG: 25 TABLET ORAL at 12:24

## 2020-01-01 RX ADMIN — LOSARTAN POTASSIUM 100 MG: 50 TABLET, FILM COATED ORAL at 08:09

## 2020-01-01 RX ADMIN — FENTANYL CITRATE 50 MCG: 50 INJECTION, SOLUTION INTRAMUSCULAR; INTRAVENOUS at 08:27

## 2020-01-01 RX ADMIN — INSULIN LISPRO 5 UNITS: 100 INJECTION, SOLUTION INTRAVENOUS; SUBCUTANEOUS at 11:50

## 2020-01-01 RX ADMIN — GLIPIZIDE 5 MG: 5 TABLET ORAL at 09:40

## 2020-01-01 RX ADMIN — ATENOLOL 25 MG: 25 TABLET ORAL at 21:49

## 2020-01-01 RX ADMIN — PHENYLEPHRINE HYDROCHLORIDE 100 MCG: 10 INJECTION INTRAVENOUS at 15:32

## 2020-01-01 RX ADMIN — PROPOFOL: 10 INJECTION, EMULSION INTRAVENOUS at 14:42

## 2020-01-01 RX ADMIN — SERTRALINE HYDROCHLORIDE 25 MG: 50 TABLET ORAL at 09:28

## 2020-01-01 RX ADMIN — FINASTERIDE 5 MG: 5 TABLET, FILM COATED ORAL at 21:48

## 2020-01-01 RX ADMIN — FAMOTIDINE 20 MG: 20 TABLET ORAL at 09:29

## 2020-01-01 RX ADMIN — AMLODIPINE BESYLATE 10 MG: 5 TABLET ORAL at 08:53

## 2020-01-01 RX ADMIN — AMPICILLIN AND SULBACTAM 3 G: 1; 2 INJECTION, POWDER, FOR SOLUTION INTRAMUSCULAR; INTRAVENOUS at 08:09

## 2020-01-01 RX ADMIN — SUGAMMADEX 200 MG: 100 INJECTION, SOLUTION INTRAVENOUS at 09:34

## 2020-01-01 RX ADMIN — HYDROCODONE BITARTRATE AND ACETAMINOPHEN 1 TABLET: 7.5; 325 TABLET ORAL at 19:29

## 2020-01-01 RX ADMIN — AMLODIPINE BESYLATE 10 MG: 5 TABLET ORAL at 08:52

## 2020-01-01 RX ADMIN — ENOXAPARIN SODIUM 40 MG: 40 INJECTION SUBCUTANEOUS at 20:48

## 2020-01-01 RX ADMIN — PANTOPRAZOLE SODIUM 40 MG: 40 TABLET, DELAYED RELEASE ORAL at 05:04

## 2020-01-01 RX ADMIN — Medication 10 ML: at 12:09

## 2020-01-01 RX ADMIN — ZOLPIDEM TARTRATE 5 MG: 5 TABLET, COATED ORAL at 20:58

## 2020-01-01 RX ADMIN — Medication 10 ML: at 08:36

## 2020-01-01 RX ADMIN — Medication 10 ML: at 08:56

## 2020-01-01 RX ADMIN — PREGABALIN 100 MG: 100 CAPSULE ORAL at 20:53

## 2020-01-01 RX ADMIN — AMPICILLIN AND SULBACTAM 3 G: 1; 2 INJECTION, POWDER, FOR SOLUTION INTRAMUSCULAR; INTRAVENOUS at 21:01

## 2020-01-01 RX ADMIN — PREGABALIN 100 MG: 100 CAPSULE ORAL at 09:10

## 2020-01-01 RX ADMIN — ISOSORBIDE MONONITRATE 60 MG: 60 TABLET, EXTENDED RELEASE ORAL at 09:46

## 2020-01-01 RX ADMIN — AMPICILLIN AND SULBACTAM 3 G: 1; 2 INJECTION, POWDER, FOR SOLUTION INTRAMUSCULAR; INTRAVENOUS at 21:49

## 2020-01-01 RX ADMIN — HYDROCODONE BITARTRATE AND ACETAMINOPHEN 1 TABLET: 7.5; 325 TABLET ORAL at 01:43

## 2020-01-01 RX ADMIN — SODIUM CHLORIDE 4 MG: 9 INJECTION, SOLUTION INTRAVENOUS at 17:23

## 2020-01-01 RX ADMIN — FERROUS SULFATE TAB EC 324 MG (65 MG FE EQUIVALENT) 324 MG: 324 (65 FE) TABLET DELAYED RESPONSE at 18:26

## 2020-01-01 RX ADMIN — PROPOFOL 100 MCG/KG/MIN: 10 INJECTION, EMULSION INTRAVENOUS at 11:47

## 2020-01-01 RX ADMIN — KETOROLAC TROMETHAMINE 30 MG: 30 INJECTION, SOLUTION INTRAMUSCULAR at 11:46

## 2020-01-01 RX ADMIN — ISOSORBIDE MONONITRATE 60 MG: 60 TABLET, EXTENDED RELEASE ORAL at 08:09

## 2020-01-01 RX ADMIN — AMLODIPINE BESYLATE 10 MG: 5 TABLET ORAL at 09:29

## 2020-01-01 RX ADMIN — INSULIN LISPRO 10 UNITS: 100 INJECTION, SOLUTION INTRAVENOUS; SUBCUTANEOUS at 08:01

## 2020-01-01 RX ADMIN — LOSARTAN POTASSIUM 100 MG: 50 TABLET, FILM COATED ORAL at 04:32

## 2020-01-01 RX ADMIN — HYDROMORPHONE HYDROCHLORIDE 1 MG: 2 INJECTION, SOLUTION INTRAMUSCULAR; INTRAVENOUS; SUBCUTANEOUS at 21:22

## 2020-01-01 RX ADMIN — HYDROMORPHONE HYDROCHLORIDE 0.5 MG: 2 INJECTION, SOLUTION INTRAMUSCULAR; INTRAVENOUS; SUBCUTANEOUS at 18:19

## 2020-01-01 RX ADMIN — ATENOLOL 25 MG: 25 TABLET ORAL at 09:38

## 2020-01-01 RX ADMIN — HYDROMORPHONE HYDROCHLORIDE 0.5 MG: 2 INJECTION, SOLUTION INTRAMUSCULAR; INTRAVENOUS; SUBCUTANEOUS at 21:48

## 2020-01-01 RX ADMIN — INSULIN LISPRO 8 UNITS: 100 INJECTION, SOLUTION INTRAVENOUS; SUBCUTANEOUS at 09:28

## 2020-01-01 RX ADMIN — PREGABALIN 100 MG: 100 CAPSULE ORAL at 20:06

## 2020-01-01 RX ADMIN — INSULIN LISPRO 10 UNITS: 100 INJECTION, SOLUTION INTRAVENOUS; SUBCUTANEOUS at 12:36

## 2020-01-01 RX ADMIN — FERROUS SULFATE TAB EC 324 MG (65 MG FE EQUIVALENT) 324 MG: 324 (65 FE) TABLET DELAYED RESPONSE at 08:09

## 2020-01-01 RX ADMIN — PROPOFOL 220 MG: 10 INJECTION, EMULSION INTRAVENOUS at 11:47

## 2020-01-01 RX ADMIN — SODIUM BICARBONATE 50 MEQ: 84 INJECTION, SOLUTION INTRAVENOUS at 07:12

## 2020-01-01 RX ADMIN — ASPIRIN 81 MG: 81 TABLET, COATED ORAL at 01:33

## 2020-01-01 RX ADMIN — SERTRALINE HYDROCHLORIDE 25 MG: 50 TABLET ORAL at 08:10

## 2020-01-01 RX ADMIN — ALPRAZOLAM 1 MG: 1 TABLET ORAL at 21:31

## 2020-01-01 RX ADMIN — PHENYLEPHRINE HYDROCHLORIDE 100 MCG: 10 INJECTION INTRAVENOUS at 14:22

## 2020-01-01 RX ADMIN — ISOSORBIDE MONONITRATE 60 MG: 60 TABLET, EXTENDED RELEASE ORAL at 09:28

## 2020-01-01 RX ADMIN — Medication 1 MG: at 07:12

## 2020-01-01 RX ADMIN — ENOXAPARIN SODIUM 40 MG: 40 INJECTION SUBCUTANEOUS at 15:44

## 2020-01-01 RX ADMIN — ALPRAZOLAM 0.5 MG: 0.5 TABLET ORAL at 21:35

## 2020-01-01 RX ADMIN — GLIPIZIDE 10 MG: 5 TABLET ORAL at 09:40

## 2020-01-01 RX ADMIN — INSULIN LISPRO 8 UNITS: 100 INJECTION, SOLUTION INTRAVENOUS; SUBCUTANEOUS at 17:14

## 2020-01-01 RX ADMIN — HYDROMORPHONE HYDROCHLORIDE 0.5 MG: 2 INJECTION, SOLUTION INTRAMUSCULAR; INTRAVENOUS; SUBCUTANEOUS at 18:47

## 2020-01-01 RX ADMIN — GLIPIZIDE 10 MG: 5 TABLET ORAL at 09:28

## 2020-01-01 RX ADMIN — FENTANYL CITRATE 50 MCG: 50 INJECTION, SOLUTION INTRAMUSCULAR; INTRAVENOUS at 08:34

## 2020-01-01 RX ADMIN — FERROUS SULFATE TAB EC 324 MG (65 MG FE EQUIVALENT) 324 MG: 324 (65 FE) TABLET DELAYED RESPONSE at 21:48

## 2020-01-01 RX ADMIN — HYDROCODONE BITARTRATE AND ACETAMINOPHEN 1 TABLET: 10; 325 TABLET ORAL at 09:40

## 2020-01-01 RX ADMIN — Medication 10 ML: at 20:01

## 2020-01-01 RX ADMIN — ONDANSETRON 4 MG: 2 INJECTION INTRAMUSCULAR; INTRAVENOUS at 17:39

## 2020-01-01 RX ADMIN — LOSARTAN POTASSIUM 100 MG: 50 TABLET, FILM COATED ORAL at 12:25

## 2020-01-01 RX ADMIN — HYDROCODONE BITARTRATE AND ACETAMINOPHEN 1 TABLET: 7.5; 325 TABLET ORAL at 20:00

## 2020-01-01 RX ADMIN — Medication 3 ML: at 21:29

## 2020-01-01 RX ADMIN — Medication 10 ML: at 09:30

## 2020-01-01 RX ADMIN — IOPAMIDOL 50 ML: 755 INJECTION, SOLUTION INTRAVENOUS at 15:45

## 2020-01-01 RX ADMIN — METFORMIN ER 500 MG 2000 MG: 500 TABLET ORAL at 18:09

## 2020-01-01 RX ADMIN — Medication 3 ML: at 10:20

## 2020-01-01 RX ADMIN — AMPICILLIN AND SULBACTAM 3 G: 1; 2 INJECTION, POWDER, FOR SOLUTION INTRAMUSCULAR; INTRAVENOUS at 09:30

## 2020-01-01 RX ADMIN — ATENOLOL 25 MG: 25 TABLET ORAL at 20:38

## 2020-01-01 RX ADMIN — ATENOLOL 25 MG: 25 TABLET ORAL at 08:02

## 2020-01-01 RX ADMIN — FERROUS SULFATE TAB EC 324 MG (65 MG FE EQUIVALENT) 324 MG: 324 (65 FE) TABLET DELAYED RESPONSE at 17:15

## 2020-01-01 RX ADMIN — FINASTERIDE 5 MG: 5 TABLET, FILM COATED ORAL at 20:01

## 2020-01-01 RX ADMIN — PROPOFOL: 10 INJECTION, EMULSION INTRAVENOUS at 13:10

## 2020-01-01 RX ADMIN — MORPHINE SULFATE 2 MG: 4 INJECTION INTRAVENOUS at 23:48

## 2020-01-01 RX ADMIN — ONDANSETRON 4 MG: 2 INJECTION INTRAMUSCULAR; INTRAVENOUS at 08:36

## 2020-01-01 RX ADMIN — ROSUVASTATIN CALCIUM 10 MG: 10 TABLET, FILM COATED ORAL at 08:02

## 2020-01-01 RX ADMIN — LOSARTAN POTASSIUM 100 MG: 50 TABLET, FILM COATED ORAL at 07:42

## 2020-01-01 RX ADMIN — INSULIN LISPRO 8 UNITS: 100 INJECTION, SOLUTION INTRAVENOUS; SUBCUTANEOUS at 11:26

## 2020-01-01 RX ADMIN — AMLODIPINE BESYLATE 10 MG: 5 TABLET ORAL at 04:32

## 2020-01-01 RX ADMIN — INSULIN LISPRO 7 UNITS: 100 INJECTION, SOLUTION INTRAVENOUS; SUBCUTANEOUS at 21:48

## 2020-01-01 RX ADMIN — Medication 10 ML: at 08:10

## 2020-01-01 RX ADMIN — FERROUS SULFATE TAB EC 324 MG (65 MG FE EQUIVALENT) 324 MG: 324 (65 FE) TABLET DELAYED RESPONSE at 18:09

## 2020-01-01 RX ADMIN — AMPICILLIN AND SULBACTAM 3 G: 1; 2 INJECTION, POWDER, FOR SOLUTION INTRAMUSCULAR; INTRAVENOUS at 15:25

## 2020-01-01 RX ADMIN — AMPICILLIN AND SULBACTAM 3 G: 1; 2 INJECTION, POWDER, FOR SOLUTION INTRAMUSCULAR; INTRAVENOUS at 03:32

## 2020-01-01 RX ADMIN — INSULIN LISPRO 4 UNITS: 100 INJECTION, SOLUTION INTRAVENOUS; SUBCUTANEOUS at 07:47

## 2020-01-01 RX ADMIN — ONDANSETRON 4 MG: 2 INJECTION INTRAMUSCULAR; INTRAVENOUS at 11:06

## 2020-01-01 RX ADMIN — HYDROCODONE BITARTRATE AND ACETAMINOPHEN 1 TABLET: 7.5; 325 TABLET ORAL at 14:51

## 2020-01-01 RX ADMIN — LIDOCAINE HYDROCHLORIDE 50 MG: 20 INJECTION, SOLUTION EPIDURAL; INFILTRATION; INTRACAUDAL; PERINEURAL at 08:27

## 2020-01-01 RX ADMIN — DEXAMETHASONE 4 MG: 4 TABLET ORAL at 20:44

## 2020-01-01 RX ADMIN — INSULIN LISPRO 0 UNITS: 100 INJECTION, SOLUTION INTRAVENOUS; SUBCUTANEOUS at 17:06

## 2020-01-01 RX ADMIN — Medication 3 ML: at 09:42

## 2020-01-01 RX ADMIN — Medication 10 ML: at 21:06

## 2020-01-01 RX ADMIN — Medication 3 ML: at 08:53

## 2020-01-01 RX ADMIN — DEXAMETHASONE 4 MG: 4 TABLET ORAL at 08:01

## 2020-01-01 RX ADMIN — FERROUS SULFATE TAB EC 324 MG (65 MG FE EQUIVALENT) 324 MG: 324 (65 FE) TABLET DELAYED RESPONSE at 08:52

## 2020-01-01 RX ADMIN — LOSARTAN POTASSIUM 100 MG: 50 TABLET, FILM COATED ORAL at 09:10

## 2020-01-01 RX ADMIN — FENTANYL CITRATE 50 MCG: 50 INJECTION, SOLUTION INTRAMUSCULAR; INTRAVENOUS at 16:50

## 2020-01-01 RX ADMIN — MORPHINE SULFATE 2 MG: 4 INJECTION INTRAVENOUS at 06:54

## 2020-01-01 RX ADMIN — AMPICILLIN AND SULBACTAM 3 G: 1; 2 INJECTION, POWDER, FOR SOLUTION INTRAMUSCULAR; INTRAVENOUS at 03:40

## 2020-01-01 RX ADMIN — HYDROCODONE BITARTRATE AND ACETAMINOPHEN 1 TABLET: 7.5; 325 TABLET ORAL at 11:48

## 2020-01-01 RX ADMIN — INSULIN LISPRO 3 UNITS: 100 INJECTION, SOLUTION INTRAVENOUS; SUBCUTANEOUS at 09:55

## 2020-01-01 RX ADMIN — IOPAMIDOL 100 ML: 755 INJECTION, SOLUTION INTRAVENOUS at 15:45

## 2020-01-01 RX ADMIN — AMPICILLIN AND SULBACTAM 3 G: 1; 2 INJECTION, POWDER, FOR SOLUTION INTRAMUSCULAR; INTRAVENOUS at 21:32

## 2020-01-01 RX ADMIN — HYDROMORPHONE HYDROCHLORIDE 1 MG: 2 INJECTION, SOLUTION INTRAMUSCULAR; INTRAVENOUS; SUBCUTANEOUS at 07:53

## 2020-01-01 RX ADMIN — INSULIN LISPRO 5 UNITS: 100 INJECTION, SOLUTION INTRAVENOUS; SUBCUTANEOUS at 12:26

## 2020-01-01 RX ADMIN — HYDROMORPHONE HYDROCHLORIDE 1 MG: 2 INJECTION, SOLUTION INTRAMUSCULAR; INTRAVENOUS; SUBCUTANEOUS at 04:57

## 2020-01-01 RX ADMIN — GLIPIZIDE 10 MG: 5 TABLET ORAL at 08:09

## 2020-01-01 RX ADMIN — HYDRALAZINE HYDROCHLORIDE 10 MG: 20 INJECTION INTRAMUSCULAR; INTRAVENOUS at 17:39

## 2020-01-01 RX ADMIN — ATENOLOL 25 MG: 25 TABLET ORAL at 09:29

## 2020-01-01 RX ADMIN — PHENYLEPHRINE HYDROCHLORIDE 100 MCG: 10 INJECTION INTRAVENOUS at 17:10

## 2020-01-01 RX ADMIN — ENOXAPARIN SODIUM 40 MG: 40 INJECTION SUBCUTANEOUS at 00:00

## 2020-01-01 RX ADMIN — FERROUS SULFATE TAB EC 324 MG (65 MG FE EQUIVALENT) 324 MG: 324 (65 FE) TABLET DELAYED RESPONSE at 17:27

## 2020-01-01 RX ADMIN — ZOLPIDEM TARTRATE 5 MG: 5 TABLET, COATED ORAL at 21:49

## 2020-01-01 RX ADMIN — AMLODIPINE BESYLATE 10 MG: 5 TABLET ORAL at 08:09

## 2020-01-01 RX ADMIN — MIRTAZAPINE 15 MG: 15 TABLET, FILM COATED ORAL at 20:07

## 2020-01-01 RX ADMIN — Medication 10 ML: at 09:56

## 2020-01-01 RX ADMIN — ATENOLOL 25 MG: 25 TABLET ORAL at 21:48

## 2020-01-01 RX ADMIN — PROPOFOL 50 MG: 10 INJECTION, EMULSION INTRAVENOUS at 13:35

## 2020-01-01 RX ADMIN — AMPICILLIN AND SULBACTAM 3 G: 1; 2 INJECTION, POWDER, FOR SOLUTION INTRAMUSCULAR; INTRAVENOUS at 14:39

## 2020-01-01 RX ADMIN — INSULIN LISPRO 3 UNITS: 100 INJECTION, SOLUTION INTRAVENOUS; SUBCUTANEOUS at 09:47

## 2020-01-01 RX ADMIN — DEXAMETHASONE SODIUM PHOSPHATE 8 MG: 4 INJECTION, SOLUTION INTRAMUSCULAR; INTRAVENOUS at 13:13

## 2020-01-01 RX ADMIN — INSULIN LISPRO 10 UNITS: 100 INJECTION, SOLUTION INTRAVENOUS; SUBCUTANEOUS at 07:58

## 2020-01-01 RX ADMIN — INSULIN HUMAN 4 UNITS: 100 INJECTION, SOLUTION PARENTERAL at 21:01

## 2020-01-01 RX ADMIN — INSULIN HUMAN 4 UNITS: 100 INJECTION, SOLUTION PARENTERAL at 09:28

## 2020-01-01 RX ADMIN — ROSUVASTATIN CALCIUM 10 MG: 10 TABLET, FILM COATED ORAL at 09:40

## 2020-01-01 RX ADMIN — AMPICILLIN AND SULBACTAM 3 G: 1; 2 INJECTION, POWDER, FOR SOLUTION INTRAMUSCULAR; INTRAVENOUS at 00:30

## 2020-01-01 RX ADMIN — INSULIN LISPRO 5 UNITS: 100 INJECTION, SOLUTION INTRAVENOUS; SUBCUTANEOUS at 13:20

## 2020-01-01 RX ADMIN — CEFAZOLIN SODIUM 2 G: 1 INJECTION, POWDER, FOR SOLUTION INTRAMUSCULAR; INTRAVENOUS at 01:54

## 2020-01-01 RX ADMIN — PROPOFOL 200 MG: 10 INJECTION, EMULSION INTRAVENOUS at 08:27

## 2020-01-01 RX ADMIN — SODIUM CHLORIDE 200 MG: 9 INJECTION, SOLUTION INTRAVENOUS at 12:09

## 2020-01-01 RX ADMIN — PROPOFOL: 10 INJECTION, EMULSION INTRAVENOUS at 15:55

## 2020-01-01 RX ADMIN — CEFAZOLIN SODIUM 2 G: 1 INJECTION, POWDER, FOR SOLUTION INTRAMUSCULAR; INTRAVENOUS at 10:20

## 2020-01-01 RX ADMIN — Medication 5 MG: at 11:54

## 2020-01-01 RX ADMIN — REMIFENTANIL HYDROCHLORIDE 0.05 MCG/KG/MIN: 1 INJECTION, POWDER, LYOPHILIZED, FOR SOLUTION INTRAVENOUS at 11:48

## 2020-01-01 RX ADMIN — ROCURONIUM BROMIDE 10 MG: 10 INJECTION, SOLUTION INTRAVENOUS at 11:47

## 2020-01-01 RX ADMIN — ENOXAPARIN SODIUM 40 MG: 40 INJECTION SUBCUTANEOUS at 15:25

## 2020-01-01 RX ADMIN — ROSUVASTATIN CALCIUM 10 MG: 10 TABLET, FILM COATED ORAL at 09:28

## 2020-01-01 RX ADMIN — AMPICILLIN AND SULBACTAM 3 G: 1; 2 INJECTION, POWDER, FOR SOLUTION INTRAMUSCULAR; INTRAVENOUS at 15:44

## 2020-01-01 RX ADMIN — ISOSORBIDE MONONITRATE 60 MG: 60 TABLET, EXTENDED RELEASE ORAL at 04:31

## 2020-01-01 RX ADMIN — AMPICILLIN AND SULBACTAM 3 G: 1; 2 INJECTION, POWDER, FOR SOLUTION INTRAMUSCULAR; INTRAVENOUS at 07:49

## 2020-01-01 RX ADMIN — ROSUVASTATIN CALCIUM 10 MG: 10 TABLET, FILM COATED ORAL at 07:42

## 2020-01-01 RX ADMIN — HYDROCODONE BITARTRATE AND ACETAMINOPHEN 1 TABLET: 7.5; 325 TABLET ORAL at 08:00

## 2020-01-01 RX ADMIN — AMLODIPINE BESYLATE 10 MG: 5 TABLET ORAL at 09:10

## 2020-01-01 RX ADMIN — ISOSORBIDE MONONITRATE 60 MG: 60 TABLET, EXTENDED RELEASE ORAL at 07:44

## 2020-01-01 RX ADMIN — HYDROMORPHONE HYDROCHLORIDE 1 MG: 2 INJECTION, SOLUTION INTRAMUSCULAR; INTRAVENOUS; SUBCUTANEOUS at 11:46

## 2020-01-01 RX ADMIN — FINASTERIDE 5 MG: 5 TABLET, FILM COATED ORAL at 20:58

## 2020-01-01 RX ADMIN — CEFAZOLIN SODIUM 2 G: 1 INJECTION, POWDER, FOR SOLUTION INTRAMUSCULAR; INTRAVENOUS at 12:02

## 2020-01-01 RX ADMIN — AMLODIPINE BESYLATE 10 MG: 5 TABLET ORAL at 09:38

## 2020-01-01 RX ADMIN — METFORMIN ER 500 MG 2000 MG: 500 TABLET ORAL at 17:30

## 2020-01-01 RX ADMIN — DEXAMETHASONE 4 MG: 4 TABLET ORAL at 20:58

## 2020-01-01 RX ADMIN — Medication 10 ML: at 12:25

## 2020-01-01 RX ADMIN — FINASTERIDE 5 MG: 5 TABLET, FILM COATED ORAL at 20:44

## 2020-01-01 RX ADMIN — ALPRAZOLAM 0.5 MG: 0.5 TABLET ORAL at 21:49

## 2020-01-01 RX ADMIN — HYDRALAZINE HYDROCHLORIDE 10 MG: 20 INJECTION INTRAMUSCULAR; INTRAVENOUS at 17:45

## 2020-01-01 RX ADMIN — HYDROCODONE BITARTRATE AND ACETAMINOPHEN 1 TABLET: 10; 325 TABLET ORAL at 09:20

## 2020-01-01 RX ADMIN — PREGABALIN 100 MG: 100 CAPSULE ORAL at 09:38

## 2020-01-01 RX ADMIN — AMLODIPINE BESYLATE 10 MG: 5 TABLET ORAL at 08:02

## 2020-01-01 RX ADMIN — AMPICILLIN AND SULBACTAM 3 G: 1; 2 INJECTION, POWDER, FOR SOLUTION INTRAMUSCULAR; INTRAVENOUS at 20:44

## 2020-01-01 RX ADMIN — METFORMIN ER 500 MG 2000 MG: 500 TABLET ORAL at 17:25

## 2020-01-01 RX ADMIN — Medication 5 MG: at 14:22

## 2020-01-01 RX ADMIN — LABETALOL 20 MG/4 ML (5 MG/ML) INTRAVENOUS SYRINGE 10 MG: at 22:22

## 2020-01-01 RX ADMIN — PHENYLEPHRINE HYDROCHLORIDE 100 MCG: 10 INJECTION INTRAVENOUS at 14:42

## 2020-01-01 RX ADMIN — Medication 10 ML: at 20:57

## 2020-01-01 RX ADMIN — Medication 10 ML: at 08:01

## 2020-01-01 RX ADMIN — FERROUS SULFATE TAB EC 324 MG (65 MG FE EQUIVALENT) 324 MG: 324 (65 FE) TABLET DELAYED RESPONSE at 17:20

## 2020-01-01 RX ADMIN — MORPHINE SULFATE 2 MG: 4 INJECTION INTRAVENOUS at 04:30

## 2020-01-01 RX ADMIN — FINASTERIDE 5 MG: 5 TABLET, FILM COATED ORAL at 20:53

## 2020-01-01 RX ADMIN — INSULIN HUMAN 4 UNITS: 100 INJECTION, SOLUTION PARENTERAL at 12:25

## 2020-01-01 RX ADMIN — FINASTERIDE 5 MG: 5 TABLET, FILM COATED ORAL at 21:31

## 2020-01-01 RX ADMIN — SODIUM CHLORIDE: 0.9 INJECTION, SOLUTION INTRAVENOUS at 15:23

## 2020-01-01 RX ADMIN — PREGABALIN 100 MG: 100 CAPSULE ORAL at 08:53

## 2020-01-01 RX ADMIN — CEFTRIAXONE SODIUM 1 G: 10 INJECTION, POWDER, FOR SOLUTION INTRAVENOUS at 22:15

## 2020-01-01 RX ADMIN — ATENOLOL 25 MG: 25 TABLET ORAL at 08:09

## 2020-01-01 RX ADMIN — LOSARTAN POTASSIUM 100 MG: 50 TABLET, FILM COATED ORAL at 08:54

## 2020-01-01 RX ADMIN — HYDROCODONE BITARTRATE AND ACETAMINOPHEN 1 TABLET: 7.5; 325 TABLET ORAL at 04:55

## 2020-01-01 RX ADMIN — SODIUM CHLORIDE 9 ML/HR: 0.9 INJECTION, SOLUTION INTRAVENOUS at 08:05

## 2020-01-01 RX ADMIN — INSULIN LISPRO 4 UNITS: 100 INJECTION, SOLUTION INTRAVENOUS; SUBCUTANEOUS at 09:11

## 2020-01-01 RX ADMIN — GADOTERIDOL 20 ML: 279.3 INJECTION, SOLUTION INTRAVENOUS at 12:45

## 2020-01-01 RX ADMIN — SERTRALINE HYDROCHLORIDE 25 MG: 50 TABLET ORAL at 07:46

## 2020-01-01 RX ADMIN — HYDROMORPHONE HYDROCHLORIDE 1 MG: 2 INJECTION INTRAMUSCULAR; INTRAVENOUS; SUBCUTANEOUS at 16:54

## 2020-01-01 RX ADMIN — FAMOTIDINE 20 MG: 20 TABLET ORAL at 08:01

## 2020-01-01 RX ADMIN — INSULIN LISPRO 7 UNITS: 100 INJECTION, SOLUTION INTRAVENOUS; SUBCUTANEOUS at 18:46

## 2020-01-01 RX ADMIN — ROSUVASTATIN CALCIUM 10 MG: 10 TABLET, FILM COATED ORAL at 09:38

## 2020-01-01 RX ADMIN — AMLODIPINE BESYLATE 10 MG: 5 TABLET ORAL at 01:09

## 2020-01-01 RX ADMIN — INSULIN LISPRO 10 UNITS: 100 INJECTION, SOLUTION INTRAVENOUS; SUBCUTANEOUS at 17:20

## 2020-01-01 RX ADMIN — HYDROCODONE BITARTRATE AND ACETAMINOPHEN 1 TABLET: 7.5; 325 TABLET ORAL at 21:50

## 2020-01-01 RX ADMIN — CEFAZOLIN SODIUM 2 G: 1 INJECTION, POWDER, FOR SOLUTION INTRAMUSCULAR; INTRAVENOUS at 15:57

## 2020-01-01 RX ADMIN — LOSARTAN POTASSIUM 100 MG: 50 TABLET, FILM COATED ORAL at 09:28

## 2020-01-01 RX ADMIN — ROSUVASTATIN CALCIUM 10 MG: 10 TABLET, FILM COATED ORAL at 12:24

## 2020-01-01 RX ADMIN — HYDROCODONE BITARTRATE AND ACETAMINOPHEN 1 TABLET: 7.5; 325 TABLET ORAL at 20:58

## 2020-01-01 RX ADMIN — SODIUM CHLORIDE: 0.9 INJECTION, SOLUTION INTRAVENOUS at 11:53

## 2020-01-01 RX ADMIN — FERROUS SULFATE TAB EC 324 MG (65 MG FE EQUIVALENT) 324 MG: 324 (65 FE) TABLET DELAYED RESPONSE at 17:06

## 2020-01-01 RX ADMIN — FENTANYL CITRATE 100 MCG: 50 INJECTION, SOLUTION INTRAMUSCULAR; INTRAVENOUS at 11:51

## 2020-01-01 RX ADMIN — FERROUS SULFATE TAB EC 324 MG (65 MG FE EQUIVALENT) 324 MG: 324 (65 FE) TABLET DELAYED RESPONSE at 09:38

## 2020-01-01 RX ADMIN — SODIUM BICARBONATE 50 MEQ: 84 INJECTION, SOLUTION INTRAVENOUS at 07:13

## 2020-01-01 RX ADMIN — INSULIN LISPRO 10 UNITS: 100 INJECTION, SOLUTION INTRAVENOUS; SUBCUTANEOUS at 09:30

## 2020-01-01 RX ADMIN — ISOSORBIDE MONONITRATE 60 MG: 60 TABLET, EXTENDED RELEASE ORAL at 09:10

## 2020-01-01 RX ADMIN — Medication 10 ML: at 01:37

## 2020-01-01 RX ADMIN — Medication 10 ML: at 21:31

## 2020-01-01 RX ADMIN — SUCCINYLCHOLINE CHLORIDE 80 MG: 20 INJECTION, SOLUTION INTRAMUSCULAR; INTRAVENOUS at 11:48

## 2020-01-01 RX ADMIN — INSULIN LISPRO 4 UNITS: 100 INJECTION, SOLUTION INTRAVENOUS; SUBCUTANEOUS at 12:38

## 2020-01-01 RX ADMIN — HYDROCODONE BITARTRATE AND ACETAMINOPHEN 1 TABLET: 10; 325 TABLET ORAL at 08:53

## 2020-01-01 RX ADMIN — HYDROMORPHONE HYDROCHLORIDE 0.5 MG: 2 INJECTION, SOLUTION INTRAMUSCULAR; INTRAVENOUS; SUBCUTANEOUS at 05:30

## 2020-01-01 RX ADMIN — SERTRALINE HYDROCHLORIDE 25 MG: 50 TABLET ORAL at 12:24

## 2020-01-01 RX ADMIN — AMPICILLIN AND SULBACTAM 3 G: 1; 2 INJECTION, POWDER, FOR SOLUTION INTRAMUSCULAR; INTRAVENOUS at 06:04

## 2020-01-01 RX ADMIN — AMPICILLIN AND SULBACTAM 3 G: 1; 2 INJECTION, POWDER, FOR SOLUTION INTRAMUSCULAR; INTRAVENOUS at 02:44

## 2020-01-01 RX ADMIN — PREGABALIN 100 MG: 100 CAPSULE ORAL at 20:38

## 2020-01-01 RX ADMIN — HYDROCODONE BITARTRATE AND ACETAMINOPHEN 1 TABLET: 10; 325 TABLET ORAL at 19:19

## 2020-01-01 RX ADMIN — INSULIN LISPRO 5 UNITS: 100 INJECTION, SOLUTION INTRAVENOUS; SUBCUTANEOUS at 17:12

## 2020-01-01 RX ADMIN — PANTOPRAZOLE SODIUM 40 MG: 40 TABLET, DELAYED RELEASE ORAL at 05:41

## 2020-01-01 RX ADMIN — ROSUVASTATIN CALCIUM 10 MG: 10 TABLET, FILM COATED ORAL at 09:10

## 2020-01-01 RX ADMIN — FERROUS SULFATE TAB EC 324 MG (65 MG FE EQUIVALENT) 324 MG: 324 (65 FE) TABLET DELAYED RESPONSE at 17:12

## 2020-01-01 RX ADMIN — FINASTERIDE 5 MG: 5 TABLET, FILM COATED ORAL at 20:50

## 2020-01-01 RX ADMIN — Medication 0.2 MG: at 08:37

## 2020-01-01 RX ADMIN — ALPRAZOLAM 0.5 MG: 0.5 TABLET ORAL at 03:41

## 2020-01-01 RX ADMIN — LIDOCAINE HYDROCHLORIDE 60 MG: 20 INJECTION, SOLUTION EPIDURAL; INFILTRATION; INTRACAUDAL; PERINEURAL at 11:47

## 2020-01-01 RX ADMIN — SODIUM CHLORIDE, SODIUM LACTATE, POTASSIUM CHLORIDE, AND CALCIUM CHLORIDE 1000 ML: 600; 310; 30; 20 INJECTION, SOLUTION INTRAVENOUS at 11:06

## 2020-01-01 RX ADMIN — AMPICILLIN AND SULBACTAM 3 G: 1; 2 INJECTION, POWDER, FOR SOLUTION INTRAMUSCULAR; INTRAVENOUS at 08:01

## 2020-01-01 RX ADMIN — FERROUS SULFATE TAB EC 324 MG (65 MG FE EQUIVALENT) 324 MG: 324 (65 FE) TABLET DELAYED RESPONSE at 08:01

## 2020-01-01 RX ADMIN — ATENOLOL 25 MG: 25 TABLET ORAL at 20:58

## 2020-01-01 RX ADMIN — FAMOTIDINE 20 MG: 20 TABLET ORAL at 12:24

## 2020-01-01 RX ADMIN — ISOSORBIDE MONONITRATE 60 MG: 60 TABLET, EXTENDED RELEASE ORAL at 08:02

## 2020-01-01 RX ADMIN — INSULIN LISPRO 4 UNITS: 100 INJECTION, SOLUTION INTRAVENOUS; SUBCUTANEOUS at 11:22

## 2020-01-06 NOTE — PROGRESS NOTES
Encounter Date:01/06/2020  Last seen 7/1/2019      Patient ID: Clemente Salinas is a 74 y.o. male.    Chief Complaint:  Status post CABG  Status post stent  Diabetes  Dyslipidemia  Hypertension      History of Present Illness  Since I have last seen, the patient has been without any chest discomfort ,shortness of breath, palpitations, dizziness or syncope.  Denies having any headache ,abdominal pain ,nausea, vomiting , diarrhea constipation, loss of weight or loss of appetite.  Denies having any excessive bruising ,hematuria or blood in the stool.    Review of all systems negative except as indicated    Assessment and Plan       [[[[[[[[[[[[[[[[[  Impression  =============   - status post CABG 07/2001.      Status post stent to SVG to lateral marginal branch and LAD 09/09/2013.  Status post stent to SVG to RCA 06/18/2013 after subendocardial myocardial infarction. lima to LAD was patent.  SVG to 2nd marginal branch and RCA were patent.  Normal left   ventricular function.     -  exertional chest discomfort -improved.    Negative stress Cardiolite test 07/05/2016.     -systolic murmur -echocardiogram will be reviewed.  Patient has gradient of 17 mm of mercury across aortic valve.  12/29/2017     Possible left lower extremity claudication-intermittent       -diabetes dyslipidemia and hypertension -improved      - status post cholecystectomy      -past history of smoking      -history of BPH      -family history of coronary artery disease.  ============    Plan  ============  EKG showed sinus bradycardia nonspecific ST-T wave changes 54/min   patient is not having any angina pectoris or congestive heart failure  Medications were reviewed and updated.  Followup in 6 months  ]]]]]]]]]]]]]]]]]]]]]]]]           Diagnosis Plan   1. Mixed hyperlipidemia     2. Essential hypertension     3. Type 2 diabetes mellitus with other diabetic neurological complication (CMS/[facility])     4. Status post percutaneous transluminal coronary  angioplasty     5. Status post coronary artery bypass graft     LAB RESULTS (LAST 7 DAYS)    CBC        BMP        CMP         BNP        TROPONIN        CoAg        Creatinine Clearance  CrCl cannot be calculated (Patient's most recent lab result is older than the maximum 30 days allowed.).    ABG        Radiology  No radiology results for the last day                The following portions of the patient's history were reviewed and updated as appropriate: allergies, current medications, past family history, past medical history, past social history, past surgical history and problem list.    Review of Systems   Constitution: Negative for chills, fever and malaise/fatigue.   Cardiovascular: Negative for chest pain, dyspnea on exertion, leg swelling, palpitations and syncope.   Respiratory: Negative for shortness of breath.    Skin: Negative for rash.   Neurological: Negative for dizziness, light-headedness and numbness.         Current Outpatient Medications:   •  amLODIPine (NORVASC) 10 MG tablet, Take  by mouth Daily., Disp: , Rfl: 3  •  aspirin (ADULT ASPIRIN EC LOW STRENGTH) 81 MG EC tablet, ADULT ASPIRIN EC LOW STRENGTH 81 MG ORAL TABLET DELAYED RELEASE, Disp: , Rfl:   •  atenolol (TENORMIN) 25 MG tablet, TAKE 1 TABLET BY MOUTH TWICE DAILY, Disp: 180 tablet, Rfl: 1  •  benzonatate (TESSALON) 100 MG capsule, Take 1 capsule by mouth 3 (Three) Times a Day., Disp: , Rfl:   •  Cholecalciferol (CVS VITAMIN D3) 1000 units chewable tablet, Daily., Disp: , Rfl:   •  clopidogrel (PLAVIX) 75 MG tablet, TAKE 1 TABLET BY MOUTH DAILY, Disp: 90 tablet, Rfl: 1  •  finasteride (PROSCAR) 5 MG tablet, FINASTERIDE 5 MG TABS, Disp: , Rfl:   •  glimepiride (AMARYL) 2 MG tablet, GLIMEPIRIDE 2 MG TABS, Disp: , Rfl:   •  glucose blood (ONETOUCH VERIO) test strip, ONETOUCH VERIO STRP, Disp: , Rfl:   •  isosorbide mononitrate (IMDUR) 60 MG 24 hr tablet, TAKE 1 TABLET BY MOUTH DAILY, Disp: 90 tablet, Rfl: 0  •  losartan (COZAAR) 100 MG  tablet, LOSARTAN POTASSIUM 50 MG TABS, Disp: , Rfl:   •  metFORMIN ER (GLUCOPHAGE-XR) 500 MG 24 hr tablet, 4 (Four) Times a Day., Disp: , Rfl:   •  niacin (NIASPAN) 500 MG CR tablet, Take 1 tablet by mouth Daily., Disp: , Rfl:   •  niacin 500 MG tablet, Daily., Disp: , Rfl:   •  nitroglycerin (NITROSTAT) 0.4 MG SL tablet, DISSOLVE ONE TABLET UNDER TONGUE AS NEEDED FOR CHEST PAIN, Disp: 25 tablet, Rfl: 0  •  ofloxacin (OCUFLOX) 0.3 % ophthalmic solution, , Disp: , Rfl: 0  •  Omega-3 Fatty Acids (CVS FISH OIL) 1200 MG capsule, CVS FISH OIL 1200 MG CAPS, Disp: , Rfl:   •  pantoprazole (PROTONIX) 40 MG EC tablet, Take 1 tablet by mouth Daily., Disp: , Rfl:   •  promethazine-dextromethorphan (PROMETHAZINE-DM) 6.25-15 MG/5ML syrup, TK 5ML PO QHS, Disp: , Rfl:   •  rosuvastatin (CRESTOR) 10 MG tablet, Take 1 tablet by mouth Daily., Disp: , Rfl:     No Known Allergies    Family History   Problem Relation Age of Onset   • Heart disease Father    • Heart attack Father 62   • Heart attack Paternal Grandmother 72   • Heart disease Paternal Grandmother        Past Surgical History:   Procedure Laterality Date   • CHOLECYSTECTOMY     • CORONARY ANGIOPLASTY WITH STENT PLACEMENT  09/09/2013    LAD   • CORONARY ARTERY BYPASS GRAFT  07/2001   • CORONARY STENT PLACEMENT  06/18/2013   • FEMORAL ARTERY STENT  08/2019       Past Medical History:   Diagnosis Date   • BPH (benign prostatic hyperplasia)    • CAD (coronary artery disease)    • DM type 2 (diabetes mellitus, type 2) (CMS/[facility]) 2001   • HLD (hyperlipidemia)    • HTN (hypertension) 2003   • Myocardial infarction (CMS/[facility])    • Peripheral neuropathy    • Vitamin D deficiency 12/2010       Family History   Problem Relation Age of Onset   • Heart disease Father    • Heart attack Father 62   • Heart attack Paternal Grandmother 72   • Heart disease Paternal Grandmother        Social History     Socioeconomic History   • Marital status:      Spouse name: Not on file   • Number  "of children: Not on file   • Years of education: Not on file   • Highest education level: Not on file   Tobacco Use   • Smoking status: Former Smoker   • Smokeless tobacco: Never Used   Substance and Sexual Activity   • Alcohol use: No   • Drug use: No   • Sexual activity: Defer           ECG 12 Lead  Date/Time: 1/6/2020 12:29 PM  Performed by: Pj Hough MD  Authorized by: Pj Hough MD   Comparison: compared with previous ECG   Similar to previous ECG  Comments: Sinus bradycardia nonspecific ST-T wave changes 57/min normal axis normal intervals no ectopy no change from 7/1/2019              Objective:       Physical Exam    /68 (BP Location: Right arm, Patient Position: Sitting, Cuff Size: Adult)   Pulse 65   Ht 175.3 cm (69\")   Wt 94.8 kg (209 lb)   SpO2 96%   BMI 30.86 kg/m²   The patient is alert, oriented and in no distress.    Vital signs as noted above.    Head and neck revealed no carotid bruits or jugular venous distension.  No thyromegaly or lymphadenopathy is present.    Lungs clear.  No wheezing.  Breath sounds are normal bilaterally.    Heart normal first and second heart sounds.  No murmur..  No pericardial rub is present.  No gallop is present.    Abdomen soft and nontender.  No organomegaly is present.    Extremities revealed good peripheral pulses without any pedal edema.    Skin warm and dry.    Musculoskeletal system is grossly normal.    CNS grossly normal.        "

## 2020-04-28 PROBLEM — C34.91 MALIGNANT NEOPLASM OF RIGHT LUNG (HCC): Status: ACTIVE | Noted: 2020-01-01

## 2020-05-01 NOTE — CONSULTS
Referring Provider: Sola Guerrero MD  Reason for Consultation: Spinal metastasis    Patient Care Team:  Sola Guerrero MD as PCP - General  Jeremi Blandon MD as PCP - Claims Attributed  Pj Hough MD as Consulting Physician (Cardiology)  Fili Cordova MD as Consulting Physician (Hematology and Oncology)    Chief complaint back and mid scapular pain    Subjective .     History of present illness: Mr. Salinas is a 74-year-old right-handed gentleman who presented to the emergency department after complaining of progressive shoulder pain and low back pain.  He denies any shortness of breath but states he had a 20 pound weight loss over the past 3 months.  He is a past smoker who stopped 31 years ago he was a pack-a-day smoker at that time.  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  CT scan of the chest was performed demonstrating a mass consistent with probable lung cancer  Imaging also straighter metastatic lesions left thoracic spine.  Subsequent CT scan cervical thoracic and lumbar spine demonstrate diffuse spinal metastatic lesions predominantly on the right side involving the facets at the C7-T1 and C5-6 levels as well as anterior vertebral body metastatic lesions.  Patient denies any weakness or radicular pain.  He states that most of his pain is almost constant in the mid scapular area and sometimes in his shoulder.  CT is also demonstrating multiple spinal lesions as well as sacral lesions.  There are no obvious fractures.  The patient states that most of his pain is at night and it is quite intense.  Has had 1 round of steroids in the past which took care of a lot of his pain.  States that most of his pain is at night when he tries to go to sleep and just cannot get comfortable.    Review of Systems  Pertinent items are noted in HPI    History  Past Medical History:   Diagnosis Date   • BPH (benign prostatic hyperplasia)    • CAD (coronary  artery disease)    • DM type 2 (diabetes mellitus, type 2) (CMS/[facility]) 2001   • HLD (hyperlipidemia)    • HTN (hypertension) 2003   • Myocardial infarction (CMS/[facility])    • Peripheral neuropathy    • Vitamin D deficiency 12/2010   ,   Past Surgical History:   Procedure Laterality Date   • CHOLECYSTECTOMY     • CORONARY ANGIOPLASTY WITH STENT PLACEMENT  09/09/2013    LAD   • CORONARY ARTERY BYPASS GRAFT  07/2001   • CORONARY STENT PLACEMENT  06/18/2013   • FEMORAL ARTERY STENT  08/2019   ,   Family History   Problem Relation Age of Onset   • Heart disease Father    • Heart attack Father 62   • Heart attack Paternal Grandmother 72   • Heart disease Paternal Grandmother    ,   Social History     Tobacco Use   • Smoking status: Former Smoker   • Smokeless tobacco: Never Used   Substance Use Topics   • Alcohol use: No   • Drug use: No    and   Medications Prior to Admission   Medication Sig Dispense Refill Last Dose   • amLODIPine (NORVASC) 10 MG tablet Take 10 mg by mouth Daily.  3 4/27/2020 at Unknown time   • aspirin (ADULT ASPIRIN EC LOW STRENGTH) 81 MG EC tablet Take 81 mg by mouth 2 (Two) Times a Day.   4/27/2020 at Unknown time   • atenolol (TENORMIN) 25 MG tablet Take 25 mg by mouth 2 (Two) Times a Day.   4/27/2020 at Unknown time   • Cholecalciferol ([employer] VITAMIN D3) 1000 units chewable tablet Chew 1 tablet Daily.   4/27/2020 at Unknown time   • clopidogrel (PLAVIX) 75 MG tablet Take 75 mg by mouth Daily.   4/27/2020 at Unknown time   • finasteride (PROSCAR) 5 MG tablet Take 5 mg by mouth Every Night.   4/27/2020 at Unknown time   • glimepiride (AMARYL) 2 MG tablet Take 4 mg by mouth 2 (Two) Times a Day.   4/27/2020 at Unknown time   • isosorbide mononitrate (IMDUR) 60 MG 24 hr tablet Take 60 mg by mouth Daily.   4/27/2020 at Unknown time   • losartan (COZAAR) 100 MG tablet Take 100 mg by mouth Daily.   4/27/2020 at Unknown time   • metFORMIN ER (GLUCOPHAGE-XR) 500 MG 24 hr tablet Take 2,000 mg by mouth Daily Before  Supper.   4/27/2020 at Unknown time   • niacin (NIASPAN) 500 MG CR tablet Take 1 tablet by mouth Daily.   4/27/2020 at Unknown time   • nitroglycerin (NITROSTAT) 0.4 MG SL tablet DISSOLVE ONE TABLET UNDER TONGUE AS NEEDED FOR CHEST PAIN (Patient taking differently: Place 0.4 mg under the tongue Every 5 (Five) Minutes As Needed.) 25 tablet 0 Taking   • Omega-3 Fatty Acids ([employer] FISH OIL) 1200 MG capsule Take 1 capsule by mouth 2 (Two) Times a Day.   4/27/2020 at Unknown time   • raNITIdine (ZANTAC) 75 MG tablet Take 75 mg by mouth Daily.   4/27/2020 at Unknown time   • rosuvastatin (CRESTOR) 10 MG tablet Take 1 tablet by mouth Daily.   4/27/2020 at Unknown time       Objective     Vital Signs   Temp:  [98.9 °F (37.2 °C)-99.7 °F (37.6 °C)] 99.2 °F (37.3 °C)  Heart Rate:  [82-91] 91  Resp:  [16-20] 20  BP: (137-157)/(65-73) 157/65    Physical Exam:  Well fed, well-developed, no apparent distress   Alert and oriented by 3  Speech is intact and coherent articulate with good content and production  Cranial nerves III through XII are grossly intact with pupils symmetric and reactive and no gaze paresis or nystagmus  Sensation is intact to soft touch and pinprick  in both upper and lower extremities  Proprioception is intact in both upper and lower extremities and symmetric  Motor strength is 5/5 in both upper and lower extremities with no focal motor deficits  Reflexes are 1+ in both upper and lower extremities with no upper motor neuron signs  No dysmetria or dysdiadochokinesia  Extremities are normal symmetric with 2+ distal pulses and less than 2-second cap refill and no evidence of DVT  No tenderness to palpation along the axial spine    Results Review:   I reviewed the patient's new clinical results.  DATE OF EXAM:  4/30/2020 2:03 PM     PROCEDURE:   CT HEAD W WO CONTRAST-     INDICATIONS:   Neoplasm: chest, lung, staging; C34.91-Malignant neoplasm of unspecified  part of right bronchus or lung; C79.9-Secondary  malignant neoplasm of  unspecified site     COMPARISON:  None.     TECHNIQUE:   Routine transaxial and coronal reconstruction images were obtained  through the head before and after intravenous administration of  75 mL  of Isovue 370 (for a total of 150 mL for all CTs performed today).  Automated exposure control and iterative reconstruction methods were  used.     FINDINGS:  No suspicious mass or mass effect is seen. The ventricular system is  nondilated. Gray-white matter differentiation is preserved. There are  atherosclerotic vascular calcifications at the skull base. No osseous  lesions are seen. Included paranasal sinuses and mastoid air cells are  clear.     No abnormal enhancement is seen on postcontrast imaging.      IMPRESSION:  No acute intracranial abnormality identified. No CT evidence of  intracranial metastatic disease. Please note that MRI is more sensitive.     Electronically Signed By-Jasmyn Perez On:4/30/2020 3:27 PM  This report was finalized on [id number] by  Jasmyn Perez, .      DATE OF EXAM:  4/30/2020 2:03 PM     PROCEDURE:  CT CERVICAL SPINE W CONTRAST-      INDICATIONS:   Abnormal xray, cervical spine, DJD; C34.91-Malignant neoplasm of  unspecified part of right bronchus or lung; C79.9-Secondary malignant  neoplasm of unspecified site     COMPARISON:   No Comparisons Available     TECHNIQUE:  Routine transaxial slices were obtained through the lumbar spine after  the intravenous administration of 75 mL of Isovue 370. Reconstructed  coronal and sagittal images were also obtained. Automated exposure  control and iterative construction methods were used.     FINDINGS:  There are multiple lytic lesions throughout the cervical spine. There is  a small lytic lesion in the inferior C4 vertebral body. A lytic lesion  completely replaces the mid to left C5 vertebral body with concern for  endplate pathologic fracture on the left side of the vertebral body  height loss. Lytic lesion extends to  the left facet and left transverse  process with soft tissue opacifying the transverse foramen. There are  lytic lesions in the superior left and inferior C6 vertebral body. Lytic  lesion is seen in the superior, right posterior, and inferior C7  vertebral body. Right posterior C7 vertebral body lytic lesion extends  to posterior elements on the right including C6 and C7 facets with an  associated soft tissue mass that measures 2.8 cm on axial image 43,  series 8. There is probably associated neural foraminal narrowing on the  right at this level. There are lytic lesions in the included upper  thoracic spine, including posterior elements on the right at T2, as well  as posterior right fourth rib. No definite spinal canal stenosis is seen  on CT. Cervical vertebral body alignment appears maintained.     Included lung apices are clear. There are atherosclerotic vascular  calcifications.     IMPRESSION:  Multiple lytic lesions in the cervical spine, consistent with osseous  metastatic disease. Of note, lytic lesion replacing the mid to left C5  vertebral body has associated pathologic endplate fracture with height  loss on the left, as well as involvement of the left transverse process  and transverse foramen. Additionally, lytic lesion in the right side of  the C7 vertebral body extends to posterior elements on the right,  including through the right C6-C7 facet joint.     I called the result to Dr. Cordova's NP at 4:00 PM on 04/30/2020.     Electronically Signed By-Jasmyn Perez On:4/30/2020 4:04 PM  This report was finalized on [id number] by  Jasmyn Perez,       DATE OF EXAM:  4/30/2020 2:04 PM     PROCEDURE:  CT THORACIC SPINE W CONTRAST-      INDICATIONS:   Abnormal xray, thoracic spine, bone destruction; C34.91-Malignant  neoplasm of unspecified part of right bronchus or lung; C79.9-Secondary  malignant neoplasm of unspecified site     COMPARISON:   CT chest dated 04/28/2020     TECHNIQUE:  Routine  transaxial slices were obtained through the thoracic spine after  the intravenous administration of 75 mL of Isovue 370. Reconstructed  coronal and sagittal images were also obtained. Automated exposure  control and iterative construction methods were used.     FINDINGS:  There is normal alignment of the thoracic spine without evidence of  spondylolisthesis. There is extensive abnormal lytic lesion involving  the right aspect of the C7 vertebral body which extension into the  posterior elements and C6-C7 right facet joint. There is a focal lytic  lesion involving the inferior articular process of the T2 level without  extension into the facet joint. There is a lytic lesion involving the T5  spinous process with extension into the inferior articular process on  the left. There is a small lytic lesion involving the inferior endplate  at T6. There are multiple lytic lesions at the T8 level with cortical  breakthrough laterally. There are lytic lesions involving the T9-T12  levels with multiple lesions at T12 comprising the majority of the  vertebral body. There is a lytic lesion involving the spinous processes  at T11 and T12. There is no evidence of pathologic compression fracture  or height loss. There is no visible soft tissue extension into the  spinal canal. There is no evidence of spinal canal stenosis. There is  bilateral facet arthropathy at C3 T4, T4-T5, and T5-T6 without neural  foraminal narrowing. The paraspinal musculature appears symmetric. There  is partial visualization of a right lower lobe mass/consolidation.  Please see dedicated CT chest report from 04/28/2024 for findings and  details.     IMPRESSION:  1. Extensive lytic metastatic disease involving the thoracic spine as  detailed above.  2. No pathologic compression fractures or clear epidural soft tissue  component is identified.     DATE OF EXAM:  4/30/2020 2:04 PM     PROCEDURE:  CT LUMBAR SPINE W CONTRAST-      INDICATIONS:   Lung cancer  staging, back pain Abnormal xray, lumbar spine, bone  destruction; C34.91-Malignant neoplasm of unspecified part of right  bronchus or lung; C79.9-Secondary malignant neoplasm of unspecified site     COMPARISON:   No Comparisons Available     TECHNIQUE:  Routine transaxial slices were obtained through the lumbar spine after  the intravenous administration of 75 mL of Isovue 370. Reconstructed  coronal and sagittal images were also obtained. Automated exposure  control and iterative construction methods were used.     FINDINGS:  There is normal alignment of the lumbar spine without evidence of  spondylolisthesis. There are multiple lytic lesions throughout the  lumbar spine involving all vertebral body levels. There is a large lytic  lesion involving the anterior aspect of the L1 vertebral body with  cortical thinning anteriorly. There is no evidence of pathologic  fracture. There is no involvement of the posterior elements. There is an  expansile lytic lesion involving the right posterior aspect of the L2  vertebral body with extension into the right pedicle and right posterior  elements. There is lateral cortical breakthrough. There is relative  sparing of the L3 vertebral body with only a small lytic lesion  involving the superior endplate. There are multiple lytic lesions within  the L4 vertebral body with dominant lesion along the anterior aspect of  the vertebra. There is a pathologic fracture through the inferior  endplate at L4 without significant height loss. There is involvement of  the posterior elements with a lytic lesion centered within the right  superior articular facet at L4. There is a large lytic lesion within the  left aspect of the L5 vertebral body with lateral cortical breakthrough.  There is a lytic lesion involving the S1 vertebral body as well as the  posterior bilateral iliac bones.     There is no retropulsion or soft tissue extension into the osseous  spinal canal. The minimal canal  diameter is 13 mm posterior to the L2-L3  level.  There is multilevel degenerative disc disease. The paraspinal  musculature appears symmetric. Visualized portions of the  retroperitoneum are better evaluated on the concurrently performed CT of  the abdomen and pelvis. Please see that report for full findings and  details.     Level by level analysis:  L1-L2: There is a minimal posterior broad-based disc bulge. There is no  spinal canal stenosis. There is mild facet arthropathy without neural  foraminal narrowing.  L2-L3: There is a broad-based posterior disc bulge with minimal  posterior endplate spurring. There is no significant spinal canal  stenosis with minimal canal diameter of 13 mm. There is mild facet  arthropathy without neural foraminal narrowing.  L3-L4: There is a mild circumferential disc bulge. There is no spinal  canal stenosis. There is mild bilateral ligamentum flavum thickening and  facet arthropathy. There is no neural foraminal narrowing.  L4-L5: There is a circumferential disc bulge with posterior endplate  osseous ridging. There is mild spinal canal stenosis with AP canal  diameter of 10 mm. There is mild bilateral facet arthropathy. There is  mild left and no right-sided neural foraminal narrowing secondary to  endplate spurring in the foraminal and extraforaminal zone.  L5-S1: There is a broad-based posterior disc osteophyte complex,  asymmetric to the left. There is no spinal canal stenosis There is left  greater than right facet arthropathy and facet spurring. There is  moderate to severe left-sided neural foraminal narrowing and severe  right-sided neural foraminal narrowing secondary to facet spurring and  endplate spurring in the foraminal zone.     IMPRESSION:  1. Multiple lytic lesions throughout the lumbar spine with areas of  cortical breakthrough and nondisplaced pathologic fracture.  2. No evidence of retropulsion or soft tissue tumoral extension into the  osseous spinal  canal.  3. Multilevel degenerative disc disease with mild spinal canal stenosis  at L4-5 and moderate to severe left and severe right-sided neural  foraminal narrowing at L5-S1.  4. Please see concurrently performed CT abdomen and pelvis report from  same date for additional description of the pelvic osseous metastatic  disease.     Electronically Signed By-Dallin Ivey On:4/30/2020 3:44 PM  This report was finalized on [id number] by  Dallin Ivey, .    DATE OF EXAM:  4/30/2020 2:04 PM     PROCEDURE:  CT LUMBAR SPINE W CONTRAST-      INDICATIONS:   Lung cancer staging, back pain Abnormal xray, lumbar spine, bone  destruction; C34.91-Malignant neoplasm of unspecified part of right  bronchus or lung; C79.9-Secondary malignant neoplasm of unspecified site     COMPARISON:   No Comparisons Available     TECHNIQUE:  Routine transaxial slices were obtained through the lumbar spine after  the intravenous administration of 75 mL of Isovue 370. Reconstructed  coronal and sagittal images were also obtained. Automated exposure  control and iterative construction methods were used.     FINDINGS:  There is normal alignment of the lumbar spine without evidence of  spondylolisthesis. There are multiple lytic lesions throughout the  lumbar spine involving all vertebral body levels. There is a large lytic  lesion involving the anterior aspect of the L1 vertebral body with  cortical thinning anteriorly. There is no evidence of pathologic  fracture. There is no involvement of the posterior elements. There is an  expansile lytic lesion involving the right posterior aspect of the L2  vertebral body with extension into the right pedicle and right posterior  elements. There is lateral cortical breakthrough. There is relative  sparing of the L3 vertebral body with only a small lytic lesion  involving the superior endplate. There are multiple lytic lesions within  the L4 vertebral body with dominant lesion along the anterior aspect  of  the vertebra. There is a pathologic fracture through the inferior  endplate at L4 without significant height loss. There is involvement of  the posterior elements with a lytic lesion centered within the right  superior articular facet at L4. There is a large lytic lesion within the  left aspect of the L5 vertebral body with lateral cortical breakthrough.  There is a lytic lesion involving the S1 vertebral body as well as the  posterior bilateral iliac bones.     There is no retropulsion or soft tissue extension into the osseous  spinal canal. The minimal canal diameter is 13 mm posterior to the L2-L3  level.  There is multilevel degenerative disc disease. The paraspinal  musculature appears symmetric. Visualized portions of the  retroperitoneum are better evaluated on the concurrently performed CT of  the abdomen and pelvis. Please see that report for full findings and  details.     Level by level analysis:  L1-L2: There is a minimal posterior broad-based disc bulge. There is no  spinal canal stenosis. There is mild facet arthropathy without neural  foraminal narrowing.  L2-L3: There is a broad-based posterior disc bulge with minimal  posterior endplate spurring. There is no significant spinal canal  stenosis with minimal canal diameter of 13 mm. There is mild facet  arthropathy without neural foraminal narrowing.  L3-L4: There is a mild circumferential disc bulge. There is no spinal  canal stenosis. There is mild bilateral ligamentum flavum thickening and  facet arthropathy. There is no neural foraminal narrowing.  L4-L5: There is a circumferential disc bulge with posterior endplate  osseous ridging. There is mild spinal canal stenosis with AP canal  diameter of 10 mm. There is mild bilateral facet arthropathy. There is  mild left and no right-sided neural foraminal narrowing secondary to  endplate spurring in the foraminal and extraforaminal zone.  L5-S1: There is a broad-based posterior disc osteophyte  complex,  asymmetric to the left. There is no spinal canal stenosis There is left  greater than right facet arthropathy and facet spurring. There is  moderate to severe left-sided neural foraminal narrowing and severe  right-sided neural foraminal narrowing secondary to facet spurring and  endplate spurring in the foraminal zone.     IMPRESSION:  1. Multiple lytic lesions throughout the lumbar spine with areas of  cortical breakthrough and nondisplaced pathologic fracture.  2. No evidence of retropulsion or soft tissue tumoral extension into the  osseous spinal canal.  3. Multilevel degenerative disc disease with mild spinal canal stenosis  at L4-5 and moderate to severe left and severe right-sided neural  foraminal narrowing at L5-S1.  4. Please see concurrently performed CT abdomen and pelvis report from  same date for additional description of the pelvic osseous metastatic  disease.     Electronically Signed By-Dallin Ivey On:4/30/2020 3:44 PM  This report was finalized on [id number] by  Dallin Ivey, .    Assessment/Plan       Malignant neoplasm of right lung (CMS/HCC)      Patient carries a 74-year-old gentleman with newly diagnosed diffuse metastatic lesion most likely lung cancer.  There are areas of the cervical spine and lumbar spine to me which are concerning for potential future destabilization.  I do not see any obvious evidence of cord compression at this time and will need to obtain an MRI of the neural axis including the brain.  CT scan did not demonstrate any obvious metastatic lesions but this will be very important a diagnostic decision making if he does have metastatic lesions.  He is planned Monday for a biopsy to determine the pathology.  He has a history of stents which were placed cardiac in 2013 and a September 2019 iliac stent.  Even though he is on Plavix we are past the time that the should be of concern feel like imaging should be able to be obtained.  This would greatly assist  in the overall planning for intervention.  Based upon the NOMS criteria we would clearly need to understand his overall prognosis before discussing any potential intervention.  I feel most of his bone pain is cancer pain and not per se mechanical 100%.  At this time we will start him on low-dose steroids in conjunction with the Zometa to assist in his bone pain.    I discussed the patients findings and my recommendations with patient and nursing staff    Pawel Angel MD  05/01/20  09:22

## 2020-05-01 NOTE — PROGRESS NOTES
Daily Progress Note        Malignant neoplasm of right lung (CMS/HCC)      Assessment:     lung mass bronchoalveolar markings in the superior segment of the right lower lobe differential diagnosis is cancer+/- pneumonia  No PE  COVID-19 test is negative  Viral panel is negative  Possible bone metastasis        Recommendations:     Navigation bronchoscopy on 5/4/2020 at 8 AM    Last Plavix on 04/28/2020     discussed with hematology oncology and primary team    antibiotics  Unasyn                 LOS: 1 day     Subjective         Objective     Vital signs for last 24 hours:  Vitals:    04/30/20 0500 04/30/20 1130 04/30/20 2035 05/01/20 0809   BP: 173/71 137/73 149/69 157/65   BP Location: Right arm Left arm Left arm Left arm   Patient Position: Lying Lying Lying Lying   Pulse:  82 88 91   Resp:  16 17 20   Temp: 99.5 °F (37.5 °C) 98.9 °F (37.2 °C) 99.7 °F (37.6 °C) 99.2 °F (37.3 °C)   TempSrc: Oral Oral Oral Oral   SpO2:  95% 94% 92%   Weight:   87.2 kg (192 lb 3.2 oz)    Height:           Intake/Output last 3 shifts:  I/O last 3 completed shifts:  In: 1740 [P.O.:1440; IV Piggyback:300]  Out: 1400 [Urine:1400]  Intake/Output this shift:  No intake/output data recorded.      Radiology  Imaging Results (Last 24 Hours)     Procedure Component Value Units Date/Time    CT Cervical Spine With Contrast [[id number]] Collected:  04/30/20 1544     Updated:  04/30/20 1606    Narrative:          DATE OF EXAM:  4/30/2020 2:03 PM     PROCEDURE:  CT CERVICAL SPINE W CONTRAST-      INDICATIONS:   Abnormal xray, cervical spine, DJD; C34.91-Malignant neoplasm of  unspecified part of right bronchus or lung; C79.9-Secondary malignant  neoplasm of unspecified site     COMPARISON:   No Comparisons Available     TECHNIQUE:  Routine transaxial slices were obtained through the lumbar spine after  the intravenous administration of 75 mL of Isovue 370. Reconstructed  coronal and sagittal images were also obtained. Automated exposure  control  and iterative construction methods were used.     FINDINGS:  There are multiple lytic lesions throughout the cervical spine. There is  a small lytic lesion in the inferior C4 vertebral body. A lytic lesion  completely replaces the mid to left C5 vertebral body with concern for  endplate pathologic fracture on the left side of the vertebral body  height loss. Lytic lesion extends to the left facet and left transverse  process with soft tissue opacifying the transverse foramen. There are  lytic lesions in the superior left and inferior C6 vertebral body. Lytic  lesion is seen in the superior, right posterior, and inferior C7  vertebral body. Right posterior C7 vertebral body lytic lesion extends  to posterior elements on the right including C6 and C7 facets with an  associated soft tissue mass that measures 2.8 cm on axial image 43,  series 8. There is probably associated neural foraminal narrowing on the  right at this level. There are lytic lesions in the included upper  thoracic spine, including posterior elements on the right at T2, as well  as posterior right fourth rib. No definite spinal canal stenosis is seen  on CT. Cervical vertebral body alignment appears maintained.     Included lung apices are clear. There are atherosclerotic vascular  calcifications.       Impression:       Multiple lytic lesions in the cervical spine, consistent with osseous  metastatic disease. Of note, lytic lesion replacing the mid to left C5  vertebral body has associated pathologic endplate fracture with height  loss on the left, as well as involvement of the left transverse process  and transverse foramen. Additionally, lytic lesion in the right side of  the C7 vertebral body extends to posterior elements on the right,  including through the right C6-C7 facet joint.     I called the result to Dr. Cordova's NP at 4:00 PM on 04/30/2020.     Electronically Signed By-Jasmyn Perez On:4/30/2020 4:04 PM  This report was finalized on  [id number] by  Jasmyn Perez, .    CT Thoracic Spine With Contrast [[id number]] Collected:  04/30/20 1544     Updated:  04/30/20 1604    Narrative:       DATE OF EXAM:  4/30/2020 2:04 PM     PROCEDURE:  CT THORACIC SPINE W CONTRAST-      INDICATIONS:   Abnormal xray, thoracic spine, bone destruction; C34.91-Malignant  neoplasm of unspecified part of right bronchus or lung; C79.9-Secondary  malignant neoplasm of unspecified site     COMPARISON:   CT chest dated 04/28/2020     TECHNIQUE:  Routine transaxial slices were obtained through the thoracic spine after  the intravenous administration of 75 mL of Isovue 370. Reconstructed  coronal and sagittal images were also obtained. Automated exposure  control and iterative construction methods were used.     FINDINGS:  There is normal alignment of the thoracic spine without evidence of  spondylolisthesis. There is extensive abnormal lytic lesion involving  the right aspect of the C7 vertebral body which extension into the  posterior elements and C6-C7 right facet joint. There is a focal lytic  lesion involving the inferior articular process of the T2 level without  extension into the facet joint. There is a lytic lesion involving the T5  spinous process with extension into the inferior articular process on  the left. There is a small lytic lesion involving the inferior endplate  at T6. There are multiple lytic lesions at the T8 level with cortical  breakthrough laterally. There are lytic lesions involving the T9-T12  levels with multiple lesions at T12 comprising the majority of the  vertebral body. There is a lytic lesion involving the spinous processes  at T11 and T12. There is no evidence of pathologic compression fracture  or height loss. There is no visible soft tissue extension into the  spinal canal. There is no evidence of spinal canal stenosis. There is  bilateral facet arthropathy at C3 T4, T4-T5, and T5-T6 without neural  foraminal narrowing. The paraspinal  musculature appears symmetric. There  is partial visualization of a right lower lobe mass/consolidation.  Please see dedicated CT chest report from 04/28/2024 for findings and  details.       Impression:       1. Extensive lytic metastatic disease involving the thoracic spine as  detailed above.  2. No pathologic compression fractures or clear epidural soft tissue  component is identified.     Electronically Signed By-Dallin Ivey On:4/30/2020 3:58 PM  This report was finalized on [id number] by  Dallin Ivey, .    CT Abdomen Pelvis With Contrast [[id number]] Collected:  04/30/20 1519     Updated:  04/30/20 1558    Narrative:       EXAM: CT ABDOMEN PELVIS W CONTRAST-     DATE OF EXAM: 4/30/2020 2:03 PM     INDICATION: Lung cancer, non-small cell, staging; C34.91-Malignant  neoplasm of unspecified part of right bronchus or lung; C79.9-Secondary  malignant neoplasm of unspecified site.  Staging examination.     COMPARISON: CT chest dated 04/28/2020     TECHNIQUE: Contiguous axial CT images were obtained from the lung bases  to the pubic symphysis obtained following the uneventful intravenous  administration of 75 mL of Isovue 370 contrast. Sagittal and coronal  reconstructions were performed.  Automated exposure control and  iterative reconstruction methods were used.     FINDINGS:  The heart size is normal. There is no pericardial effusion. There is  coronary artery atherosclerotic calcification. There is partial  visualization of a right lower lobe mass or airspace disease measuring  3.6 x 2.9 cm. There is no pleural effusion. For full findings above the  diaphragm, please see dedicated chest CT report from 04/28/2020.     The liver demonstrates two hypodense lesions which are indeterminant.  There is a 14 mm lesion at the junction of segment II and IV on image 23  and a 12 mm lesion adjacent to the IVC within the right hepatic lobe on  image 27. The gallbladder surgically absent. There is no biliary  ductal  dilatation. The spleen, pancreas, and adrenal glands appear within  normal limits. The kidneys are symmetric in size and enhancement. There  is no hydronephrosis or urolithiasis. Urinary bladder is  circumferentially thickened likely related to chronic bladder outlet  obstruction or less likely cystitis. The prostate is enlarged.     The stomach and duodenum are normal in caliber and configuration. There  are no abnormally dilated or thickened loops of small bowel to suggest  small bowel obstruction or small bowel inflammation. The appendix is  visualized and normal within the right lower quadrant. There is no  evidence of acute colitis or diverticulitis. There is sigmoid  diverticulosis. The aorta is normal in caliber with dense aortoiliac  atherosclerotic calcification. There is no mesenteric, retroperitoneal,  or pelvic lymphadenopathy by size criteria. There is a small  fat-containing left inguinal hernia.     There are multiple lytic lesions distributed throughout the visible  thoracic and lumbar spine. Please see dedicated CT spine reports for  full findings within the spine. There are multiple lytic lesions  throughout the pelvis including posteriorly within the bilateral iliac  bones adjacent to the SI joints, right iliac wing, right anterior  acetabulum, left superior pubic ramus, right inferior pubic ramus along  the ischial tuberosity and within the bilateral proximal femurs. Many of  these lesions demonstrate cortical breakthrough however there are no  displaced pathologic fractures identified at this time.       Impression:       1. Two small liver lesions measuring up to 14 mm which are indeterminant  on CT. These may represent small cysts or metastatic disease. If it  would change clinical management, MRI of the abdomen without and with IV  contrast could be considered.  2. Partial visualization of the right lower lobe mass/consolidation.  Please see dedicated CT chest report from 04/28/2024  for findings above  the diaphragm.  3. Extensive lytic metastatic disease involving the lumbar spine and  pelvis as detailed above. Please see additional dedicated CT spine  reports for additional findings..           Electronically Signed By-Dallin Ivey On:4/30/2020 3:29 PM  This report was finalized on [id number] by  Dallin Ivey, .    CT Lumbar Spine With Contrast [[id number]] Collected:  04/30/20 1529     Updated:  04/30/20 1558    Narrative:       DATE OF EXAM:  4/30/2020 2:04 PM     PROCEDURE:  CT LUMBAR SPINE W CONTRAST-      INDICATIONS:   Lung cancer staging, back pain Abnormal xray, lumbar spine, bone  destruction; C34.91-Malignant neoplasm of unspecified part of right  bronchus or lung; C79.9-Secondary malignant neoplasm of unspecified site     COMPARISON:   No Comparisons Available     TECHNIQUE:  Routine transaxial slices were obtained through the lumbar spine after  the intravenous administration of 75 mL of Isovue 370. Reconstructed  coronal and sagittal images were also obtained. Automated exposure  control and iterative construction methods were used.     FINDINGS:  There is normal alignment of the lumbar spine without evidence of  spondylolisthesis. There are multiple lytic lesions throughout the  lumbar spine involving all vertebral body levels. There is a large lytic  lesion involving the anterior aspect of the L1 vertebral body with  cortical thinning anteriorly. There is no evidence of pathologic  fracture. There is no involvement of the posterior elements. There is an  expansile lytic lesion involving the right posterior aspect of the L2  vertebral body with extension into the right pedicle and right posterior  elements. There is lateral cortical breakthrough. There is relative  sparing of the L3 vertebral body with only a small lytic lesion  involving the superior endplate. There are multiple lytic lesions within  the L4 vertebral body with dominant lesion along the anterior aspect  of  the vertebra. There is a pathologic fracture through the inferior  endplate at L4 without significant height loss. There is involvement of  the posterior elements with a lytic lesion centered within the right  superior articular facet at L4. There is a large lytic lesion within the  left aspect of the L5 vertebral body with lateral cortical breakthrough.  There is a lytic lesion involving the S1 vertebral body as well as the  posterior bilateral iliac bones.     There is no retropulsion or soft tissue extension into the osseous  spinal canal. The minimal canal diameter is 13 mm posterior to the L2-L3  level.  There is multilevel degenerative disc disease. The paraspinal  musculature appears symmetric. Visualized portions of the  retroperitoneum are better evaluated on the concurrently performed CT of  the abdomen and pelvis. Please see that report for full findings and  details.     Level by level analysis:  L1-L2: There is a minimal posterior broad-based disc bulge. There is no  spinal canal stenosis. There is mild facet arthropathy without neural  foraminal narrowing.  L2-L3: There is a broad-based posterior disc bulge with minimal  posterior endplate spurring. There is no significant spinal canal  stenosis with minimal canal diameter of 13 mm. There is mild facet  arthropathy without neural foraminal narrowing.  L3-L4: There is a mild circumferential disc bulge. There is no spinal  canal stenosis. There is mild bilateral ligamentum flavum thickening and  facet arthropathy. There is no neural foraminal narrowing.  L4-L5: There is a circumferential disc bulge with posterior endplate  osseous ridging. There is mild spinal canal stenosis with AP canal  diameter of 10 mm. There is mild bilateral facet arthropathy. There is  mild left and no right-sided neural foraminal narrowing secondary to  endplate spurring in the foraminal and extraforaminal zone.  L5-S1: There is a broad-based posterior disc osteophyte  complex,  asymmetric to the left. There is no spinal canal stenosis There is left  greater than right facet arthropathy and facet spurring. There is  moderate to severe left-sided neural foraminal narrowing and severe  right-sided neural foraminal narrowing secondary to facet spurring and  endplate spurring in the foraminal zone.       Impression:       1. Multiple lytic lesions throughout the lumbar spine with areas of  cortical breakthrough and nondisplaced pathologic fracture.  2. No evidence of retropulsion or soft tissue tumoral extension into the  osseous spinal canal.  3. Multilevel degenerative disc disease with mild spinal canal stenosis  at L4-5 and moderate to severe left and severe right-sided neural  foraminal narrowing at L5-S1.  4. Please see concurrently performed CT abdomen and pelvis report from  same date for additional description of the pelvic osseous metastatic  disease.     Electronically Signed By-Dallin Ivey On:4/30/2020 3:44 PM  This report was finalized on [id number] by  Dallin Ivey, .    CT Head With & Without Contrast [[id number]] Collected:  04/30/20 1518     Updated:  04/30/20 1535    Narrative:          DATE OF EXAM:  4/30/2020 2:03 PM     PROCEDURE:   CT HEAD W WO CONTRAST-     INDICATIONS:   Neoplasm: chest, lung, staging; C34.91-Malignant neoplasm of unspecified  part of right bronchus or lung; C79.9-Secondary malignant neoplasm of  unspecified site     COMPARISON:  None.     TECHNIQUE:   Routine transaxial and coronal reconstruction images were obtained  through the head before and after intravenous administration of  75 mL  of Isovue 370 (for a total of 150 mL for all CTs performed today).  Automated exposure control and iterative reconstruction methods were  used.     FINDINGS:  No suspicious mass or mass effect is seen. The ventricular system is  nondilated. Gray-white matter differentiation is preserved. There are  atherosclerotic vascular calcifications at the  skull base. No osseous  lesions are seen. Included paranasal sinuses and mastoid air cells are  clear.     No abnormal enhancement is seen on postcontrast imaging.        Impression:       No acute intracranial abnormality identified. No CT evidence of  intracranial metastatic disease. Please note that MRI is more sensitive.     Electronically Signed By-Jasmyn Perez On:4/30/2020 3:27 PM  This report was finalized on [id number] by  Jasmyn Perez, .          Labs:  Results from last 7 days   Lab Units 05/01/20  0604   WBC 10*3/mm3 9.30   HEMOGLOBIN g/dL 12.1*   HEMATOCRIT % 35.5*   PLATELETS 10*3/mm3 202     Results from last 7 days   Lab Units 05/01/20  0604   SODIUM mmol/L 130*   POTASSIUM mmol/L 3.8   CHLORIDE mmol/L 93*   CO2 mmol/L 23.0   BUN mg/dL 15   CREATININE mg/dL 1.15   CALCIUM mg/dL 9.9   BILIRUBIN mg/dL 0.4   ALK PHOS U/L 156*   ALT (SGPT) U/L 14   AST (SGOT) U/L 22   GLUCOSE mg/dL 130*         Results from last 7 days   Lab Units 05/01/20  0604 04/28/20  1923   ALBUMIN g/dL 3.50 4.10     Results from last 7 days   Lab Units 04/28/20  1923   TROPONIN T ng/mL <0.010                           Meds:   SCHEDULE    amLODIPine 10 mg Oral Daily   ampicillin-sulbactam 3 g Intravenous Q6H   atenolol 25 mg Oral BID   dexamethasone 4 mg Oral Q12H   enoxaparin 40 mg Subcutaneous Q24H   famotidine 20 mg Oral Daily   ferrous sulfate 324 mg Oral BID With Meals   finasteride 5 mg Oral Nightly   glipizide 10 mg Oral QAM AC   insulin lispro 0-7 Units Subcutaneous TID AC   isosorbide mononitrate 60 mg Oral Daily   losartan 100 mg Oral Daily   rosuvastatin 10 mg Oral Daily   sertraline 25 mg Oral Daily   sodium chloride 10 mL Intravenous Q12H     Infusions     PRNs  •  ALPRAZolam  •  calcium carbonate  •  dextrose  •  dextrose  •  dextrose  •  glucagon (human recombinant)  •  HYDROcodone-acetaminophen  •  insulin lispro **AND** insulin lispro  •  magnesium hydroxide  •  nitroglycerin  •  nitroglycerin  •   ondansetron  •  sennosides-docusate  •  [COMPLETED] Insert peripheral IV **AND** sodium chloride  •  sodium chloride  •  zolpidem    Physical Exam:  Physical Exam   Cardiovascular:   Murmur heard.  Pulmonary/Chest: No respiratory distress. He has no wheezes. He has rales. He exhibits no tenderness.   Abdominal: He exhibits no distension. There is no tenderness.   Musculoskeletal: He exhibits edema.       ROS  Review of Systems   HENT: Positive for congestion. Negative for rhinorrhea and sneezing.    Respiratory: Positive for cough and shortness of breath. Negative for apnea, choking, chest tightness, wheezing and stridor.    Cardiovascular: Negative for leg swelling.             Total time spent with patient greater than: 1 Hour

## 2020-05-01 NOTE — PLAN OF CARE
Problem: Patient Care Overview  Goal: Plan of Care Review  Outcome: Ongoing (interventions implemented as appropriate)  Flowsheets (Taken 4/30/2020 1901)  Plan of Care Reviewed With: patient  Goal: Individualization and Mutuality  Outcome: Ongoing (interventions implemented as appropriate)  Goal: Discharge Needs Assessment  Outcome: Ongoing (interventions implemented as appropriate)  Goal: Interprofessional Rounds/Family Conf  Outcome: Ongoing (interventions implemented as appropriate)     Problem: Anxiety (Adult)  Goal: Identify Related Risk Factors and Signs and Symptoms  Description  Related risk factors and signs and symptoms are identified upon initiation of Human Response Clinical Practice Guideline (CPG).  Outcome: Ongoing (interventions implemented as appropriate)  Goal: Reduction/Resolution  Description  Patient will demonstrate the desired outcomes by discharge/transition of care.  Outcome: Ongoing (interventions implemented as appropriate)     Problem: Pain, Acute (Adult)  Goal: Identify Related Risk Factors and Signs and Symptoms  Description  Related risk factors and signs and symptoms are identified upon initiation of Human Response Clinical Practice Guideline (CPG).  Outcome: Ongoing (interventions implemented as appropriate)  Goal: Acceptable Pain Control/Comfort Level  Description  Patient will demonstrate the desired outcomes by discharge/transition of care.  Outcome: Ongoing (interventions implemented as appropriate)     Problem: Infection, Risk/Actual (Adult)  Goal: Identify Related Risk Factors and Signs and Symptoms  Description  Related risk factors and signs and symptoms are identified upon initiation of Human Response Clinical Practice Guideline (CPG).  Outcome: Ongoing (interventions implemented as appropriate)  Goal: Infection Prevention/Resolution  Description  Patient will demonstrate the desired outcomes by discharge/transition of care.  Outcome: Ongoing (interventions implemented as  appropriate)     Problem: Bronchoscopy (Adult)  Description  Prevent and manage potential problems includin. bleeding  2. procedure-specific discomfort  3. respiratory compromise  Goal: Signs and Symptoms of Listed Potential Problems Will be Absent, Minimized or Managed (Bronchoscopy)  Description  Signs and symptoms of listed potential problems will be absent, minimized or managed by discharge/transition of care (reference Bronchoscopy (Adult) CPG).  Outcome: Ongoing (interventions implemented as appropriate)  Goal: Anesthesia/Sedation Recovery  Outcome: Ongoing (interventions implemented as appropriate)       Pt rested throughout the shift. Pain meds given x1 for pain in back. IV abx continued. NPO status initiated at midnight for possible Bronch today . Will continue to monitor.

## 2020-05-01 NOTE — PROGRESS NOTES
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  Talking to him I called him a Medrol Dosepak fortunately the medication not resolve his pain.  He called me with some vague symptoms.  He has been saying that he has had generalized weakness, a very mild cough, cage and shortness of breath, weight loss of 20 pounds or more and some nonspecific pain in the abdomen and chest.  After talking to him we have decided to send him to the emergency room.  Work-up in the emergency room indicated that his chest x-ray was abnormal and showed a right sugey-hilar and right basilar patchy density, indicative of pneumonia.  There was chronic changes in both shoulders.  A CT of the chest PE protocol showed no evidence of pulmonary embolism.  Also showed a dense masslike consolidation of the apical segment of the right lower lobe, measuring approximately 3 cm x 3.8 cm, with surrounding patchy groundglass opacities.  But the CT of the chest indicated views lytic osseous lesions in the right posterior element of the C7 vertebral segment with associated stenosis of the right C6-7 and C7-T1 neural woods.  There is also lytic lesions in the T8, T12 and L1 vertebrae.  There were multiple large lytic lesions in the left scapula.  His comprehensive metabolic panel showed a calcium of 12.6, his ionized calcium was 1.57.  His COVID-19 was not detected.  His respiratory panel PCR was negative.  His white count was 8600 with a hemoglobin of 12.7.Patient is a 74 y.o.   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  Talking to him I called him a Medrol Dosepak fortunately the medication not resolve his pain.  He called me with some vague symptoms.  He has been saying that he has had generalized weakness, a very mild cough, cage and shortness of breath, weight loss of 20 pounds or more and some nonspecific pain in the abdomen and chest.  After talking to him we have decided to send him to the emergency room.  Work-up in the emergency room indicated that his chest x-ray was abnormal and showed a right sugey-hilar and right basilar patchy density, indicative of pneumonia.  There was chronic changes in both shoulders.  A CT of the chest PE protocol showed no evidence of pulmonary embolism.  Also showed a dense masslike consolidation of the apical segment of the right lower lobe, measuring approximately 3 cm x 3.8 cm, with surrounding patchy groundglass opacities.  But the CT of the chest indicated views lytic osseous lesions in the right posterior element of the C7 vertebral segment with associated stenosis of the right C6-7 and C7-T1 neural woods.  There is also lytic lesions in the T8, T12 and L1 vertebrae.  There were multiple large lytic lesions in the left scapula.  His comprehensive metabolic panel showed a calcium of 12.6, his ionized calcium was 1.57.  His COVID-19 was not detected.  His respiratory panel PCR was negative.  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  Talking to him I called him a Medrol Dosepak fortunately the medication not resolve his pain.  He called me with some vague symptoms.  He has been saying that he has had generalized weakness, a very mild  cough, cage and shortness of breath, weight loss of 20 pounds or more and some nonspecific pain in the abdomen and chest.  After talking to him we have decided to send him to the emergency room.  Work-up in the emergency room indicated that his chest x-ray was abnormal and showed a right sugey-hilar and right basilar patchy density, indicative of pneumonia.  There was chronic changes in both shoulders.  A CT of the chest PE protocol showed no evidence of pulmonary embolism.  Also showed a dense masslike consolidation of the apical segment of the right lower lobe, measuring approximately 3 cm x 3.8 cm, with surrounding patchy groundglass opacities.  But the CT of the chest indicated views lytic osseous lesions in the right posterior element of the C7 vertebral segment with associated stenosis of the right C6-7 and C7-T1 neural woods.  There is also lytic lesions in the T8, T12 and L1 vertebrae.  There were multiple large lytic lesions in the left scapula.  His comprehensive metabolic panel showed a calcium of 12.6, his ionized calcium was 1.57.  His COVID-19 was not detected.  His respiratory panel PCR was negative.  His white count was 8600 with a hemoglobin of 12.7.  Further examinations today has shown that the patient has lytic lesions in the thoracic and lumbar regions.  CT of the abdomen and pelvis showed that there is small lesions in the liver.  CT scan of the head was normal.    MRI of the brain showed Single 7 mm focus of enhancement in the subcortical left frontal lobe,  likely a metastatic lesion. However, given the single lesion and linear  shape along the cortex, subacute infarct is possible, but considered  less likely. No associated restricted diffusion.    Patient Complaints: Patient complains of some pain he is feeling very anxious and sad and feels distressed.  His pain when he is trying to ambulate.  Trouble sleeping last night.  He has minimal cough.  There is no hemoptysis.  There is no painful  "breathing.  Appetite is poor.  History taken from: patient    Review of Systems   Constitutional: Positive for appetite change and unexpected weight change.   HENT: Negative.    Eyes: Negative.    Respiratory: Negative.    Cardiovascular: Negative.    Gastrointestinal: Negative.    Endocrine: Negative.    Genitourinary: Negative.    Musculoskeletal: Positive for arthralgias, back pain and neck pain.   Skin: Negative.    Allergic/Immunologic: Negative.    Neurological: Negative.    Hematological: Negative.    Psychiatric/Behavioral: Positive for sleep disturbance. The patient is nervous/anxious.         Depressed mood           Objective     Vital Signs  /65 (BP Location: Left arm, Patient Position: Lying)   Pulse 91   Temp 99.2 °F (37.3 °C) (Oral)   Resp 20   Ht 175.3 cm (69\")   Wt 87.2 kg (192 lb 3.2 oz)   SpO2 92%   BMI 28.38 kg/m²       Physical Exam:     General Appearance:    Alert, cooperative, in no acute distress   Head:    Normocephalic, without obvious abnormality, atraumatic   Eyes:            Lids and lashes normal, conjunctivae and sclerae normal, no   icterus, no pallor, corneas clear, PERRLA   Ears:    Ears appear intact with no abnormalities noted   Throat:   No oral lesions, no thrush, oral mucosa moist   Neck:   No adenopathy, supple, trachea midline, no thyromegaly, no   carotid bruit, no JVD   Lungs:     Clear to auscultation,respirations regular, even and                  unlabored    Heart:    Regular rhythm and normal rate, normal S1 and S2, no            murmur, no gallop, no rub, no click   Chest Wall:    No abnormalities observed   Abdomen:     Normal bowel sounds, no masses, no organomegaly, soft        non-tender, non-distended, no guarding, no rebound                tenderness   Extremities:   Moves all extremities well, no edema, no cyanosis, no             redness   Pulses:   Pulses palpable and equal bilaterally   Skin:   No bleeding, bruising or rash   Lymph nodes:   No " palpable adenopathy   Neurologic:   Cranial nerves 2 - 12 grossly intact, sensation intact, DTR       present and equal bilaterally        Results Review:    Lab Results (last 24 hours)     Procedure Component Value Units Date/Time    POC Glucose Once [[id number]]  (Abnormal) Collected:  05/01/20 0741    Specimen:  Blood Updated:  05/01/20 0742     Glucose 143 mg/dL      Comment: Serial Number: [id number]:  [id number]       Comprehensive Metabolic Panel [[id number]]  (Abnormal) Collected:  05/01/20 0604    Specimen:  Blood Updated:  05/01/20 0713     Glucose 130 mg/dL      BUN 15 mg/dL      Creatinine 1.15 mg/dL      Sodium 130 mmol/L      Potassium 3.8 mmol/L      Chloride 93 mmol/L      CO2 23.0 mmol/L      Calcium 9.9 mg/dL      Total Protein 6.5 g/dL      Albumin 3.50 g/dL      ALT (SGPT) 14 U/L      AST (SGOT) 22 U/L      Alkaline Phosphatase 156 U/L      Total Bilirubin 0.4 mg/dL      eGFR Non African Amer 63 mL/min/1.73      Globulin 3.0 gm/dL      A/G Ratio 1.2 g/dL      BUN/Creatinine Ratio 13.0     Anion Gap 14.0 mmol/L     Narrative:       GFR Normal >60  Chronic Kidney Disease <60  Kidney Failure <15      Calcium, Ionized [[id number]]  (Abnormal) Collected:  05/01/20 0604    Specimen:  Blood Updated:  05/01/20 0703     Ionized Calcium 1.36 mmol/L     CBC & Differential [[id number]] Collected:  05/01/20 0604    Specimen:  Blood Updated:  05/01/20 0658    Narrative:       The following orders were created for panel order CBC & Differential.  Procedure                               Abnormality         Status                     ---------                               -----------         ------                     CBC Auto Differential[[id number]]        Abnormal            Final result                 Please view results for these tests on the individual orders.    CBC Auto Differential [[id number]]  (Abnormal) Collected:  05/01/20 0604    Specimen:  Blood Updated:  05/01/20 0658     WBC 9.30 10*3/mm3       RBC 4.43 10*6/mm3      Hemoglobin 12.1 g/dL      Hematocrit 35.5 %      MCV 80.0 fL      MCH 27.3 pg      MCHC 34.1 g/dL      RDW 16.9 %      RDW-SD 47.3 fl      MPV 7.7 fL      Platelets 202 10*3/mm3      Neutrophil % 87.5 %      Lymphocyte % 5.9 %      Monocyte % 6.3 %      Eosinophil % 0.2 %      Basophil % 0.1 %      Neutrophils, Absolute 8.20 10*3/mm3      Lymphocytes, Absolute 0.60 10*3/mm3      Monocytes, Absolute 0.60 10*3/mm3      Eosinophils, Absolute 0.00 10*3/mm3      Basophils, Absolute 0.00 10*3/mm3      nRBC 0.0 /100 WBC     POC Glucose Once [[id number]]  (Abnormal) Collected:  04/30/20 1623    Specimen:  Blood Updated:  04/30/20 1624     Glucose 132 mg/dL      Comment: Serial Number: [id number]:  [id number]              ECG/EMG Results (last 24 hours)     ** No results found for the last 24 hours. **          Imaging Results (Last 24 Hours)     Procedure Component Value Units Date/Time    MRI Brain With & Without Contrast [[id number]] Collected:  05/01/20 1424     Updated:  05/01/20 1434    Narrative:          DATE OF EXAM:   5/1/2020 11:10 AM     PROCEDURE:   MRI BRAIN W WO CONTRAST-     INDICATIONS:   metastatic disease; C34.91-Malignant neoplasm of unspecified part of  right bronchus or lung; C79.9-Secondary malignant neoplasm of  unspecified site     COMPARISON:  CT head without and with contrast 04/30/2020.     TECHNIQUE:   Routine magnetic resonance imaging was obtained of the brain before and  after the administration of 18 mL of ProHance contrast intravenously.     FINDINGS:   No restricted diffusion is seen to suggest acute or subacute ischemia.  There is linear restricted diffusion in the subcortical left frontal  lobe measuring 3 mm on axial image 1 11 x 7 mm on sagittal image 16. No  additional enhancing foci are identified in the brain. The ventricular  system is nondilated. Minimal periventricular/subcortical white matter  T2/FLAIR hyperintensities are nonspecific, but are most  commonly seen in  the setting of chronic small vessel ischemic change. No intracranial  hemorrhage or extra-axial collection is seen. Signal voids in the  vessels the skull base appear unremarkable. Paranasal sinuses and  mastoid air cells appear clear.        Impression:       Single 7 mm focus of enhancement in the subcortical left frontal lobe,  likely a metastatic lesion. However, given the single lesion and linear  shape along the cortex, subacute infarct is possible, but considered  less likely. No associated restricted diffusion.     Electronically Signed By-Jasmyn Perez On:5/1/2020 2:30 PM  This report was finalized on [id number] by  Jasmyn Perez, .    MRI Thoracic Spine With & Without Contrast [[id number]] Collected:  05/01/20 1404     Updated:  05/01/20 1413    Narrative:       MRI THORACIC SPINE W WO CONTRAST-     Date of Exam: 5/1/2020 11:55 AM     Indication: metastatic disease; C34.91-Malignant neoplasm of unspecified  part of right bronchus or lung; C79.9-Secondary malignant neoplasm of  unspecified site     Comparison: MRI cervical spine same date, CT thoracic spine 04/30/2020     Technique: Multiplanar multisequence images of the thoracic spine were  performed prior to and after uneventful administration of 18 ml Prohance  IV contrast.     FINDINGS:   There is heterogeneous bone marrow signal intensity. There are enhancing  masses seen throughout the thoracic spine. Largest lesion within the  spinous process of T5 with mild expansion. It has peripheral  enhancement. It measures 3.6 x 2.3 x 2.4 cm. There is normal signal  intensity of the thoracic spinal cord. There are no enhancing epidural  masses there is no significant central canal narrowing. There are  enhancing lesions involving many of the ribs.     T1-T2: No significant central canal or neural foraminal narrowing.     T2-T3: No significant central canal or neural foraminal narrowing.     T3-T4: No significant central canal or neural  foraminal narrowing.     T4-T5: No significant central canal or neural foraminal narrowing.     T5-T6: No significant central canal or neural foraminal narrowing.     T6-T7: No significant central canal or neural foraminal narrowing.     T7-T8: No significant central canal or neural foraminal narrowing.     T8-T9: No significant central canal or neural foraminal narrowing.     T9-T10: No significant central canal or neural foraminal narrowing.     T10-T11: No significant central canal or neural foraminal narrowing.     T12-L1: No significant central canal or neural foraminal narrowing.       Impression:       Diffuse osseous metastatic disease throughout the thoracic spine and  ribs. No significant central canal or neural foraminal narrowing  identified on this exam. Largest expansile lesion is seen at the spinous  process of T5.     Electronically Signed By-Heidi Montero MD On:5/1/2020 2:11 PM  This report was finalized on [id number] by  Heidi Montero MD.    MRI Cervical Spine With & Without Contrast [[id number]] Collected:  05/01/20 1341     Updated:  05/01/20 1406    Narrative:       MRI CERVICAL SPINE W WO CONTRAST-     Date of Exam: 5/1/2020 11:54 AM     Indication: metastatic disease; C34.91-Malignant neoplasm of unspecified  part of right bronchus or lung; C79.9-Secondary malignant neoplasm of  unspecified site     Comparison: CT cervical spine 04/30/2020     Technique: Multiplanar multisequence images of the cervical spine were  performed prior to and after uneventful administration of 18 ml Prohance  IV contrast according to routine cervical spine MRI protocol.     FINDINGS:   There is diffuse osseous metastatic disease. There is low T1 high T2  signal intensity replacing the entire C5 vertebral body at site of lytic  lesion seen on CT. There is abnormal low T1 high T2 signal intensity  involving the C6-C7 vertebral bodies there is osseous fusion of this  level. Abnormal signal intensity and mass is also  seen at the right  posterior elements of T2. There is extension to the right posterior  elements at the C6-C7 level with cortical destruction and there is  extension into the left posterior elements at the C5 level. There is  enhancing mass expanding and destroying the right lamina and pedicle at  the C6-C7 level. This measures about 3.3 x 3.4 x 3.2 cm.       There is no significant enhancement of the cervical spinal cord or  epidural space. There is abnormal signal intensity in the right second  rib posteriorly and less pronounced in the left second rib.     There is mild disc desiccation. The alignment is anatomic. There is mild  loss of disc height C3 C4 C4 C5 C5 C6. Osseous fusion of C6-C7 level.     C1-C2: Within normal limits.     C2-C3: No significant central canal or neural foraminal narrowing.  Uncovertebral joints are normal.     C3-C4: Small disc osteophyte complex. Mild central narrowing. Moderate  right and mild left neural foraminal narrowing. Mild facet degenerative  change.     C4-C5: Small disc osteophyte complex. Mild central narrowing. Probable  left severe neural foraminal narrowing due to the enhancing mass base to  the facet with soft tissue extension. Mild right neural foraminal  narrowing. Mild facet degenerative change.     C5-C6: Enhancing mass centered at the right facet. The neural foramina  is not well seen and there is probable severe right neuroforaminal  narrowing. There is mild left neural foraminal narrowing.     C6-C7: Enhancing mass likely causing severe right neural foraminal  narrowing. Mild central narrowing and mild left neural foraminal  narrowing.     C7-T1: No significant central canal or neural foraminal narrowing.  Moderate facet degenerative change. Fluid within the facets on the left.  Uncovertebral joints are normal.       Impression:       1.There are extensive osseous metastatic lesions with osseous  destruction and soft tissue extension most pronounced on the right  at  the C6-C7 level. These correspond to lytic lesions seen on CT.  2.There is no abnormal enhancement of the epidural space or cervical  spinal cord.  3.There is no significant central canal narrowing.  4.The evaluation of neural foramina is limited on this study. There is  neural foraminal narrowing suspected at the sites of osseous metastatic  destruction, specifically, left C4-C5 and right C5-C6 and right C6-C7.     Electronically Signed By-Heidi Montero MD On:5/1/2020 2:04 PM  This report was finalized on [id number] by  Heidi Montero MD.    CT Cervical Spine With Contrast [[id number]] Collected:  04/30/20 1544     Updated:  04/30/20 1606    Narrative:          DATE OF EXAM:  4/30/2020 2:03 PM     PROCEDURE:  CT CERVICAL SPINE W CONTRAST-      INDICATIONS:   Abnormal xray, cervical spine, DJD; C34.91-Malignant neoplasm of  unspecified part of right bronchus or lung; C79.9-Secondary malignant  neoplasm of unspecified site     COMPARISON:   No Comparisons Available     TECHNIQUE:  Routine transaxial slices were obtained through the lumbar spine after  the intravenous administration of 75 mL of Isovue 370. Reconstructed  coronal and sagittal images were also obtained. Automated exposure  control and iterative construction methods were used.     FINDINGS:  There are multiple lytic lesions throughout the cervical spine. There is  a small lytic lesion in the inferior C4 vertebral body. A lytic lesion  completely replaces the mid to left C5 vertebral body with concern for  endplate pathologic fracture on the left side of the vertebral body  height loss. Lytic lesion extends to the left facet and left transverse  process with soft tissue opacifying the transverse foramen. There are  lytic lesions in the superior left and inferior C6 vertebral body. Lytic  lesion is seen in the superior, right posterior, and inferior C7  vertebral body. Right posterior C7 vertebral body lytic lesion extends  to posterior elements on the  right including C6 and C7 facets with an  associated soft tissue mass that measures 2.8 cm on axial image 43,  series 8. There is probably associated neural foraminal narrowing on the  right at this level. There are lytic lesions in the included upper  thoracic spine, including posterior elements on the right at T2, as well  as posterior right fourth rib. No definite spinal canal stenosis is seen  on CT. Cervical vertebral body alignment appears maintained.     Included lung apices are clear. There are atherosclerotic vascular  calcifications.       Impression:       Multiple lytic lesions in the cervical spine, consistent with osseous  metastatic disease. Of note, lytic lesion replacing the mid to left C5  vertebral body has associated pathologic endplate fracture with height  loss on the left, as well as involvement of the left transverse process  and transverse foramen. Additionally, lytic lesion in the right side of  the C7 vertebral body extends to posterior elements on the right,  including through the right C6-C7 facet joint.     I called the result to Dr. Cordova's NP at 4:00 PM on 04/30/2020.     Electronically Signed By-Jasmyn Perez On:4/30/2020 4:04 PM  This report was finalized on [id number] by  Jasmyn Perez, .    CT Thoracic Spine With Contrast [[id number]] Collected:  04/30/20 1544     Updated:  04/30/20 1604    Narrative:       DATE OF EXAM:  4/30/2020 2:04 PM     PROCEDURE:  CT THORACIC SPINE W CONTRAST-      INDICATIONS:   Abnormal xray, thoracic spine, bone destruction; C34.91-Malignant  neoplasm of unspecified part of right bronchus or lung; C79.9-Secondary  malignant neoplasm of unspecified site     COMPARISON:   CT chest dated 04/28/2020     TECHNIQUE:  Routine transaxial slices were obtained through the thoracic spine after  the intravenous administration of 75 mL of Isovue 370. Reconstructed  coronal and sagittal images were also obtained. Automated exposure  control and iterative  construction methods were used.     FINDINGS:  There is normal alignment of the thoracic spine without evidence of  spondylolisthesis. There is extensive abnormal lytic lesion involving  the right aspect of the C7 vertebral body which extension into the  posterior elements and C6-C7 right facet joint. There is a focal lytic  lesion involving the inferior articular process of the T2 level without  extension into the facet joint. There is a lytic lesion involving the T5  spinous process with extension into the inferior articular process on  the left. There is a small lytic lesion involving the inferior endplate  at T6. There are multiple lytic lesions at the T8 level with cortical  breakthrough laterally. There are lytic lesions involving the T9-T12  levels with multiple lesions at T12 comprising the majority of the  vertebral body. There is a lytic lesion involving the spinous processes  at T11 and T12. There is no evidence of pathologic compression fracture  or height loss. There is no visible soft tissue extension into the  spinal canal. There is no evidence of spinal canal stenosis. There is  bilateral facet arthropathy at C3 T4, T4-T5, and T5-T6 without neural  foraminal narrowing. The paraspinal musculature appears symmetric. There  is partial visualization of a right lower lobe mass/consolidation.  Please see dedicated CT chest report from 04/28/2024 for findings and  details.       Impression:       1. Extensive lytic metastatic disease involving the thoracic spine as  detailed above.  2. No pathologic compression fractures or clear epidural soft tissue  component is identified.     Electronically Signed By-Dallin Ivey On:4/30/2020 3:58 PM  This report was finalized on [id number] by  Dallin Ivey, .    CT Abdomen Pelvis With Contrast [[id number]] Collected:  04/30/20 1519     Updated:  04/30/20 1558    Narrative:       EXAM: CT ABDOMEN PELVIS W CONTRAST-     DATE OF EXAM: 4/30/2020 2:03 PM        INDICATION: Lung cancer, non-small cell, staging; C34.91-Malignant  neoplasm of unspecified part of right bronchus or lung; C79.9-Secondary  malignant neoplasm of unspecified site.  Staging examination.     COMPARISON: CT chest dated 04/28/2020     TECHNIQUE: Contiguous axial CT images were obtained from the lung bases  to the pubic symphysis obtained following the uneventful intravenous  administration of 75 mL of Isovue 370 contrast. Sagittal and coronal  reconstructions were performed.  Automated exposure control and  iterative reconstruction methods were used.     FINDINGS:  The heart size is normal. There is no pericardial effusion. There is  coronary artery atherosclerotic calcification. There is partial  visualization of a right lower lobe mass or airspace disease measuring  3.6 x 2.9 cm. There is no pleural effusion. For full findings above the  diaphragm, please see dedicated chest CT report from 04/28/2020.     The liver demonstrates two hypodense lesions which are indeterminant.  There is a 14 mm lesion at the junction of segment II and IV on image 23  and a 12 mm lesion adjacent to the IVC within the right hepatic lobe on  image 27. The gallbladder surgically absent. There is no biliary ductal  dilatation. The spleen, pancreas, and adrenal glands appear within  normal limits. The kidneys are symmetric in size and enhancement. There  is no hydronephrosis or urolithiasis. Urinary bladder is  circumferentially thickened likely related to chronic bladder outlet  obstruction or less likely cystitis. The prostate is enlarged.     The stomach and duodenum are normal in caliber and configuration. There  are no abnormally dilated or thickened loops of small bowel to suggest  small bowel obstruction or small bowel inflammation. The appendix is  visualized and normal within the right lower quadrant. There is no  evidence of acute colitis or diverticulitis. There is sigmoid  diverticulosis. The aorta is normal in  caliber with dense aortoiliac  atherosclerotic calcification. There is no mesenteric, retroperitoneal,  or pelvic lymphadenopathy by size criteria. There is a small  fat-containing left inguinal hernia.     There are multiple lytic lesions distributed throughout the visible  thoracic and lumbar spine. Please see dedicated CT spine reports for  full findings within the spine. There are multiple lytic lesions  throughout the pelvis including posteriorly within the bilateral iliac  bones adjacent to the SI joints, right iliac wing, right anterior  acetabulum, left superior pubic ramus, right inferior pubic ramus along  the ischial tuberosity and within the bilateral proximal femurs. Many of  these lesions demonstrate cortical breakthrough however there are no  displaced pathologic fractures identified at this time.       Impression:       1. Two small liver lesions measuring up to 14 mm which are indeterminant  on CT. These may represent small cysts or metastatic disease. If it  would change clinical management, MRI of the abdomen without and with IV  contrast could be considered.  2. Partial visualization of the right lower lobe mass/consolidation.  Please see dedicated CT chest report from 04/28/2024 for findings above  the diaphragm.  3. Extensive lytic metastatic disease involving the lumbar spine and  pelvis as detailed above. Please see additional dedicated CT spine  reports for additional findings..           Electronically Signed By-Dallin Ivey On:4/30/2020 3:29 PM  This report was finalized on [id number] by  Dallin Ivey, .    CT Lumbar Spine With Contrast [[id number]] Collected:  04/30/20 1529     Updated:  04/30/20 1558    Narrative:       DATE OF EXAM:  4/30/2020 2:04 PM     PROCEDURE:  CT LUMBAR SPINE W CONTRAST-      INDICATIONS:   Lung cancer staging, back pain Abnormal xray, lumbar spine, bone  destruction; C34.91-Malignant neoplasm of unspecified part of right  bronchus or lung;  C79.9-Secondary malignant neoplasm of unspecified site     COMPARISON:   No Comparisons Available     TECHNIQUE:  Routine transaxial slices were obtained through the lumbar spine after  the intravenous administration of 75 mL of Isovue 370. Reconstructed  coronal and sagittal images were also obtained. Automated exposure  control and iterative construction methods were used.     FINDINGS:  There is normal alignment of the lumbar spine without evidence of  spondylolisthesis. There are multiple lytic lesions throughout the  lumbar spine involving all vertebral body levels. There is a large lytic  lesion involving the anterior aspect of the L1 vertebral body with  cortical thinning anteriorly. There is no evidence of pathologic  fracture. There is no involvement of the posterior elements. There is an  expansile lytic lesion involving the right posterior aspect of the L2  vertebral body with extension into the right pedicle and right posterior  elements. There is lateral cortical breakthrough. There is relative  sparing of the L3 vertebral body with only a small lytic lesion  involving the superior endplate. There are multiple lytic lesions within  the L4 vertebral body with dominant lesion along the anterior aspect of  the vertebra. There is a pathologic fracture through the inferior  endplate at L4 without significant height loss. There is involvement of  the posterior elements with a lytic lesion centered within the right  superior articular facet at L4. There is a large lytic lesion within the  left aspect of the L5 vertebral body with lateral cortical breakthrough.  There is a lytic lesion involving the S1 vertebral body as well as the  posterior bilateral iliac bones.     There is no retropulsion or soft tissue extension into the osseous  spinal canal. The minimal canal diameter is 13 mm posterior to the L2-L3  level.  There is multilevel degenerative disc disease. The paraspinal  musculature appears symmetric.  Visualized portions of the  retroperitoneum are better evaluated on the concurrently performed CT of  the abdomen and pelvis. Please see that report for full findings and  details.     Level by level analysis:  L1-L2: There is a minimal posterior broad-based disc bulge. There is no  spinal canal stenosis. There is mild facet arthropathy without neural  foraminal narrowing.  L2-L3: There is a broad-based posterior disc bulge with minimal  posterior endplate spurring. There is no significant spinal canal  stenosis with minimal canal diameter of 13 mm. There is mild facet  arthropathy without neural foraminal narrowing.  L3-L4: There is a mild circumferential disc bulge. There is no spinal  canal stenosis. There is mild bilateral ligamentum flavum thickening and  facet arthropathy. There is no neural foraminal narrowing.  L4-L5: There is a circumferential disc bulge with posterior endplate  osseous ridging. There is mild spinal canal stenosis with AP canal  diameter of 10 mm. There is mild bilateral facet arthropathy. There is  mild left and no right-sided neural foraminal narrowing secondary to  endplate spurring in the foraminal and extraforaminal zone.  L5-S1: There is a broad-based posterior disc osteophyte complex,  asymmetric to the left. There is no spinal canal stenosis There is left  greater than right facet arthropathy and facet spurring. There is  moderate to severe left-sided neural foraminal narrowing and severe  right-sided neural foraminal narrowing secondary to facet spurring and  endplate spurring in the foraminal zone.       Impression:       1. Multiple lytic lesions throughout the lumbar spine with areas of  cortical breakthrough and nondisplaced pathologic fracture.  2. No evidence of retropulsion or soft tissue tumoral extension into the  osseous spinal canal.  3. Multilevel degenerative disc disease with mild spinal canal stenosis  at L4-5 and moderate to severe left and severe right-sided  neural  foraminal narrowing at L5-S1.  4. Please see concurrently performed CT abdomen and pelvis report from  same date for additional description of the pelvic osseous metastatic  disease.     Electronically Signed By-Dallin Ivey On:4/30/2020 3:44 PM  This report was finalized on [id number] by  Dallin Ivey, .    CT Head With & Without Contrast [[id number]] Collected:  04/30/20 1518     Updated:  04/30/20 1535    Narrative:          DATE OF EXAM:  4/30/2020 2:03 PM     PROCEDURE:   CT HEAD W WO CONTRAST-     INDICATIONS:   Neoplasm: chest, lung, staging; C34.91-Malignant neoplasm of unspecified  part of right bronchus or lung; C79.9-Secondary malignant neoplasm of  unspecified site     COMPARISON:  None.     TECHNIQUE:   Routine transaxial and coronal reconstruction images were obtained  through the head before and after intravenous administration of  75 mL  of Isovue 370 (for a total of 150 mL for all CTs performed today).  Automated exposure control and iterative reconstruction methods were  used.     FINDINGS:  No suspicious mass or mass effect is seen. The ventricular system is  nondilated. Gray-white matter differentiation is preserved. There are  atherosclerotic vascular calcifications at the skull base. No osseous  lesions are seen. Included paranasal sinuses and mastoid air cells are  clear.     No abnormal enhancement is seen on postcontrast imaging.        Impression:       No acute intracranial abnormality identified. No CT evidence of  intracranial metastatic disease. Please note that MRI is more sensitive.     Electronically Signed By-Jasmyn Perez On:4/30/2020 3:27 PM  This report was finalized on [id number] by  Jasmyn Perez, .               I reviewed the patient's new clinical results.    Medication Review:   Scheduled Meds:    amLODIPine 10 mg Oral Daily   ampicillin-sulbactam 3 g Intravenous Q6H   atenolol 25 mg Oral BID   dexamethasone 4 mg Oral Q12H   enoxaparin 40 mg  Subcutaneous Q24H   famotidine 20 mg Oral Daily   ferrous sulfate 324 mg Oral BID With Meals   finasteride 5 mg Oral Nightly   glipizide 10 mg Oral QAM AC   insulin lispro 0-7 Units Subcutaneous TID AC   isosorbide mononitrate 60 mg Oral Daily   losartan 100 mg Oral Daily   rosuvastatin 10 mg Oral Daily   sertraline 25 mg Oral Daily   sodium chloride 10 mL Intravenous Q12H     Continuous Infusions:   PRN Meds:.•  ALPRAZolam  •  calcium carbonate  •  dextrose  •  dextrose  •  dextrose  •  glucagon (human recombinant)  •  HYDROcodone-acetaminophen  •  insulin lispro **AND** insulin lispro  •  magnesium hydroxide  •  nitroglycerin  •  nitroglycerin  •  ondansetron  •  sennosides-docusate  •  [COMPLETED] Insert peripheral IV **AND** sodium chloride  •  sodium chloride  •  zolpidem     Assessment/Plan     1. Right lower lobe lung mass,  with lytic lesions visible, most likely primary lung malignancy with metastasis.  2. Single 7 mm focus of enhancement in the subcortical left frontal lobe,likely a metastatic lesion      3. Neck pain, low back painand shoulder pain secondary to cervical / thoracic/ lumbar lytic lesions and herniation\neural foraminal narrowing suspected at theleft C4-C5 and right C5-C6 and right C6-C7  4. Remote history of tobacco use quit years ago history of 10 pack-year smoking/ History of asbestos exposure  5. Hypercalcemia.   6. High d-dimer.  7. Microcytic iron deficiency anemia  8. Diabetes mellitus type 2  9. Essential hypertension  10. Coronary artery disease with prior myocardial infarction  11. BPH asymptomatic  12. Mixed hyperlipidemia  13. Peripheral diabetic neuropathy  14. Vitamin D deficiency  15. History of coronary artery bypass graft  16. Atherosclerosis  17. Adjustment disorder with anxiety      Active Problems:    Malignant neoplasm of right lung (CMS/HCC)      We will manage his pain with Norco 5/325 1 every 6 hours as needed.  We will also give him zolpidem for sleep at night.  We  will start him a small dose of sertraline.    Plan for disposition: Hopefully he will return to home    Sola Guerrero MD  05/01/20  15:25

## 2020-05-01 NOTE — PLAN OF CARE
Problem: Patient Care Overview  Goal: Plan of Care Review  Outcome: Ongoing (interventions implemented as appropriate)  Flowsheets  Taken 5/1/2020 1636  Progress: no change  Taken 5/1/2020 0720  Plan of Care Reviewed With: patient  Goal: Individualization and Mutuality  Outcome: Ongoing (interventions implemented as appropriate)  Goal: Discharge Needs Assessment  Outcome: Ongoing (interventions implemented as appropriate)  Goal: Interprofessional Rounds/Family Conf  Outcome: Ongoing (interventions implemented as appropriate)     Problem: Anxiety (Adult)  Goal: Identify Related Risk Factors and Signs and Symptoms  Outcome: Ongoing (interventions implemented as appropriate)  Goal: Reduction/Resolution  Outcome: Ongoing (interventions implemented as appropriate)     Problem: Pain, Acute (Adult)  Goal: Identify Related Risk Factors and Signs and Symptoms  Outcome: Ongoing (interventions implemented as appropriate)  Goal: Acceptable Pain Control/Comfort Level  Outcome: Ongoing (interventions implemented as appropriate)     Problem: Infection, Risk/Actual (Adult)  Goal: Identify Related Risk Factors and Signs and Symptoms  Outcome: Ongoing (interventions implemented as appropriate)  Goal: Infection Prevention/Resolution  Outcome: Ongoing (interventions implemented as appropriate)     Problem: Bronchoscopy (Adult)  Goal: Signs and Symptoms of Listed Potential Problems Will be Absent, Minimized or Managed (Bronchoscopy)  Outcome: Ongoing (interventions implemented as appropriate)  Goal: Anesthesia/Sedation Recovery  Outcome: Ongoing (interventions implemented as appropriate)

## 2020-05-01 NOTE — PROGRESS NOTES
Hematology/Oncology Inpatient Progress Note    PATIENT NAME: Clemente Salinas  : 1945  MRN: [id number]    CHIEF COMPLAINT: Right lower lobe lung mass    HISTORY OF PRESENT ILLNESS:    74 y.o. male admitted through the ED on 2020 complaining of bilateral shoulder pain and back pain.  It seemed to be worse on the left side.  He claims that he is been feeling weak for about a month prior to admission.  He had a cough without fever in 2019 and his wife had a cough also at that time which has since resolved. He had a course of steroids for the shoulder pain without relief. He has lost 20 pounds of weight since 2019.  He has shortness of air on exertion but no hemoptysis.  He denies nausea.  He has had chronic swelling of the left right foot after coronary artery bypass surgery.     20  Hematology/Oncology was consulted for lung mass.  Admission labs revealed calcium 12.6 (8.6-10.5), alkaline phosphatase 187 (), d-dimer 3.77 (0.17-0.59), WBC 8.6 hemoglobin 12.7 MCV 81.8 RDW 16.7 platelet count 261,000, RVP negative.  SARS-CoV-2 was not detected.  Chest x-ray revealed a right perihilar and basilar patchy density and CT chest reveals dense masslike consolidation in the apical segment of the right lower bowel with surrounding opacities.  Nodular opacities in both lungs were present.  Diffuse lytic osseous metastatic disease was present with large destructive destructive lesion in the right C 7 posterior elements with large lytic lesion of the T8, T12 and L1 vertebral bodies.  Possible metastatic soft tissue nodule in the lateral left chest wall/axilla was present.  Pulmonary consultation was obtained.     Patient is a retired teacher and quit smoking in .    Interval work-up  · 2020 iron 44 (), TIBC 350 (298-436), iron saturation 13 (20-50), ferritin 115.7 (), ionized calcium 1.57 (1.2-1.3).  · CT abdomen and pelvis showed 2 small liver lesions measuring up to 14  mm, indeterminate for cyst versus metastatic disease.  CT cervical, thoracic, and lumbar spine showed extensive lytic lesions.  There was a pathologic fracture at C5 with involvement of the left transverse process and transverse foramen.  There was a lytic lesion at C6-7. There were areas of cortical breakthrough and nondisplaced pathologic fracture in the lumbar spine.  No evidence of retropulsion or soft tissue tumoral extension into the osseous spinal canal.  CT head was negative for metastatic disease.     PCP: Sola Guerrero MD    Subjective Pain remains controlled with meds. Slept better.     ROS:  Review of Systems   Constitutional: Negative for activity change, chills, fatigue, fever and unexpected weight change.   HENT: Negative for congestion, dental problem, hearing loss, mouth sores, nosebleeds, sore throat and trouble swallowing.    Eyes: Negative for photophobia and visual disturbance.   Respiratory: Negative for cough, chest tightness and shortness of breath.    Cardiovascular: Negative for chest pain, palpitations and leg swelling.   Gastrointestinal: Negative for abdominal distention, abdominal pain, blood in stool, constipation, diarrhea, nausea and vomiting.   Endocrine: Negative for cold intolerance and heat intolerance.   Genitourinary: Negative for decreased urine volume, difficulty urinating, frequency, hematuria and urgency.   Musculoskeletal: Positive for back pain and neck pain. Negative for arthralgias and gait problem.   Skin: Negative for rash and wound.   Neurological: Negative for dizziness, tremors, weakness, light-headedness, numbness and headaches.   Hematological: Negative for adenopathy. Does not bruise/bleed easily.   Psychiatric/Behavioral: Negative for confusion and hallucinations. The patient is not nervous/anxious.    All other systems reviewed and are negative.       MEDICATIONS:    Scheduled Meds:      amLODIPine 10 mg Oral Daily   ampicillin-sulbactam 3 g Intravenous Q6H   "  atenolol 25 mg Oral BID   enoxaparin 40 mg Subcutaneous Q24H   famotidine 20 mg Oral Daily   ferrous sulfate 324 mg Oral BID With Meals   finasteride 5 mg Oral Nightly   glipizide 10 mg Oral QAM AC   insulin lispro 0-7 Units Subcutaneous TID AC   isosorbide mononitrate 60 mg Oral Daily   losartan 100 mg Oral Daily   rosuvastatin 10 mg Oral Daily   sertraline 25 mg Oral Daily   sodium chloride 10 mL Intravenous Q12H      Continuous Infusions:      PRN Meds:  •  ALPRAZolam  •  calcium carbonate  •  dextrose  •  dextrose  •  dextrose  •  glucagon (human recombinant)  •  HYDROcodone-acetaminophen  •  insulin lispro **AND** insulin lispro  •  magnesium hydroxide  •  nitroglycerin  •  nitroglycerin  •  ondansetron  •  sennosides-docusate  •  [COMPLETED] Insert peripheral IV **AND** sodium chloride  •  sodium chloride  •  zolpidem     ALLERGIES:  No Known Allergies    Objective    VITALS:   /65 (BP Location: Left arm, Patient Position: Lying)   Pulse 91   Temp 99.2 °F (37.3 °C) (Oral)   Resp 20   Ht 175.3 cm (69\")   Wt 87.2 kg (192 lb 3.2 oz)   SpO2 92%   BMI 28.38 kg/m²     PHYSICAL EXAM:  Physical Exam   Constitutional: He is oriented to person, place, and time. He appears well-developed and well-nourished. No distress.   HENT:   Head: Normocephalic and atraumatic.   Nose: Nose normal.   Mouth/Throat: Oropharynx is clear and moist. No oropharyngeal exudate.   Dental fillings   Eyes: Pupils are equal, round, and reactive to light. Conjunctivae and EOM are normal. No scleral icterus.   Neck: Normal range of motion. Neck supple.   Cardiovascular: Normal rate, regular rhythm and intact distal pulses.   No murmur heard.  Grade 2 systolic murmur, monitor leads   Pulmonary/Chest: Effort normal and breath sounds normal. No respiratory distress. He has no wheezes. He has no rales.   Abdominal: Soft. Bowel sounds are normal. He exhibits no distension and no mass. There is no tenderness. There is no guarding. "   Genitourinary:   Genitourinary Comments: Deferred    Musculoskeletal: Normal range of motion. He exhibits no edema, tenderness or deformity.   RUE IV   Lymphadenopathy:     He has no cervical adenopathy.     He has no axillary adenopathy.        Right: No supraclavicular adenopathy present.        Left: No supraclavicular adenopathy present.   Neurological: He is alert and oriented to person, place, and time. Coordination normal.   Skin: Skin is warm and dry. No rash noted. He is not diaphoretic. No erythema. No pallor.   Psychiatric: He has a normal mood and affect. His behavior is normal. Thought content normal.   Nursing note and vitals reviewed.        RECENT LABS:  Lab Results (last 24 hours)     Procedure Component Value Units Date/Time    POC Glucose Once [[id number]]  (Abnormal) Collected:  05/01/20 0741    Specimen:  Blood Updated:  05/01/20 0742     Glucose 143 mg/dL      Comment: Serial Number: [id number]:  [id number]       Comprehensive Metabolic Panel [[id number]]  (Abnormal) Collected:  05/01/20 0604    Specimen:  Blood Updated:  05/01/20 0713     Glucose 130 mg/dL      BUN 15 mg/dL      Creatinine 1.15 mg/dL      Sodium 130 mmol/L      Potassium 3.8 mmol/L      Chloride 93 mmol/L      CO2 23.0 mmol/L      Calcium 9.9 mg/dL      Total Protein 6.5 g/dL      Albumin 3.50 g/dL      ALT (SGPT) 14 U/L      AST (SGOT) 22 U/L      Alkaline Phosphatase 156 U/L      Total Bilirubin 0.4 mg/dL      eGFR Non African Amer 63 mL/min/1.73      Globulin 3.0 gm/dL      A/G Ratio 1.2 g/dL      BUN/Creatinine Ratio 13.0     Anion Gap 14.0 mmol/L     Narrative:       GFR Normal >60  Chronic Kidney Disease <60  Kidney Failure <15      Calcium, Ionized [[id number]]  (Abnormal) Collected:  05/01/20 0604    Specimen:  Blood Updated:  05/01/20 0703     Ionized Calcium 1.36 mmol/L     CBC & Differential [[id number]] Collected:  05/01/20 0604    Specimen:  Blood Updated:  05/01/20 0658    Narrative:       The following  orders were created for panel order CBC & Differential.  Procedure                               Abnormality         Status                     ---------                               -----------         ------                     CBC Auto Differential[[id number]]        Abnormal            Final result                 Please view results for these tests on the individual orders.    CBC Auto Differential [[id number]]  (Abnormal) Collected:  05/01/20 0604    Specimen:  Blood Updated:  05/01/20 0658     WBC 9.30 10*3/mm3      RBC 4.43 10*6/mm3      Hemoglobin 12.1 g/dL      Hematocrit 35.5 %      MCV 80.0 fL      MCH 27.3 pg      MCHC 34.1 g/dL      RDW 16.9 %      RDW-SD 47.3 fl      MPV 7.7 fL      Platelets 202 10*3/mm3      Neutrophil % 87.5 %      Lymphocyte % 5.9 %      Monocyte % 6.3 %      Eosinophil % 0.2 %      Basophil % 0.1 %      Neutrophils, Absolute 8.20 10*3/mm3      Lymphocytes, Absolute 0.60 10*3/mm3      Monocytes, Absolute 0.60 10*3/mm3      Eosinophils, Absolute 0.00 10*3/mm3      Basophils, Absolute 0.00 10*3/mm3      nRBC 0.0 /100 WBC     POC Glucose Once [[id number]]  (Abnormal) Collected:  04/30/20 1623    Specimen:  Blood Updated:  04/30/20 1624     Glucose 132 mg/dL      Comment: Serial Number: [id number]:  [id number]       POC Glucose Once [[id number]]  (Abnormal) Collected:  04/30/20 1302    Specimen:  Blood Updated:  04/30/20 1303     Glucose 145 mg/dL      Comment: Serial Number: [id number]:  [id number]       Creatinine, Serum [[id number]]  (Normal) Collected:  04/30/20 0828    Specimen:  Blood Updated:  04/30/20 1054     Creatinine 1.03 mg/dL      eGFR Non African Amer 71 mL/min/1.73     Narrative:       GFR Normal >60  Chronic Kidney Disease <60  Kidney Failure <15            PENDING RESULTS:  stool Hemoccult, ionized calcium.    IMAGING REVIEWED:  Ct Head With & Without Contrast    Result Date: 4/30/2020  No acute intracranial abnormality identified. No CT evidence of  intracranial metastatic disease. Please note that MRI is more sensitive.  Electronically Signed ByRadha Perez On:4/30/2020 3:27 PM This report was finalized on [id number] by  Jasmyn Perez, .    Ct Cervical Spine With Contrast    Result Date: 4/30/2020  Multiple lytic lesions in the cervical spine, consistent with osseous metastatic disease. Of note, lytic lesion replacing the mid to left C5 vertebral body has associated pathologic endplate fracture with height loss on the left, as well as involvement of the left transverse process and transverse foramen. Additionally, lytic lesion in the right side of the C7 vertebral body extends to posterior elements on the right, including through the right C6-C7 facet joint.  I called the result to Dr. Cordova's NP at 4:00 PM on 04/30/2020.  Electronically Signed ByRadha Perez On:4/30/2020 4:04 PM This report was finalized on [id number] by  Jasmyn Perez, .    Ct Thoracic Spine With Contrast    Result Date: 4/30/2020  1. Extensive lytic metastatic disease involving the thoracic spine as detailed above. 2. No pathologic compression fractures or clear epidural soft tissue component is identified.  Electronically Signed ByKatelynn Ivey On:4/30/2020 3:58 PM This report was finalized on [id number] by  Dallin Ivey, .    Ct Lumbar Spine With Contrast    Result Date: 4/30/2020  1. Multiple lytic lesions throughout the lumbar spine with areas of cortical breakthrough and nondisplaced pathologic fracture. 2. No evidence of retropulsion or soft tissue tumoral extension into the osseous spinal canal. 3. Multilevel degenerative disc disease with mild spinal canal stenosis at L4-5 and moderate to severe left and severe right-sided neural foraminal narrowing at L5-S1. 4. Please see concurrently performed CT abdomen and pelvis report from same date for additional description of the pelvic osseous metastatic disease.  Electronically Signed By-Dallin Ivey  On:4/30/2020 3:44 PM This report was finalized on [id number] by  Dallin Ivey, .    Ct Abdomen Pelvis With Contrast    Result Date: 4/30/2020  1. Two small liver lesions measuring up to 14 mm which are indeterminant on CT. These may represent small cysts or metastatic disease. If it would change clinical management, MRI of the abdomen without and with IV contrast could be considered. 2. Partial visualization of the right lower lobe mass/consolidation. Please see dedicated CT chest report from 04/28/2024 for findings above the diaphragm. 3. Extensive lytic metastatic disease involving the lumbar spine and pelvis as detailed above. Please see additional dedicated CT spine reports for additional findings..    Electronically Signed By-Dallin Ivey On:4/30/2020 3:29 PM This report was finalized on [id number] by  Dallin Ivey, .      I have reviewed the patient's labs, imaging, reports, and other clinician documentation.    Assessment/Plan   ASSESSMENT:  1. Right lower lobe lung mass -  Along with other metastatic disease as well as lytic lesions visible, most likely primary lung malignancy with metastasis.  We will proceed with further work-up.  Patient has stents and MRI will not be possible.    Spinal CTs show extensive osseous metastases with pathologic fracture at C5 and lesion at C6-7.  Additional extensive lesions in thoracic and lumbar spine.   Pulmonary and neurosurgery consulted.  Bronchoscopy planned.  Will need biopsy for path.  2. Hypercalcemia -  Ionized calcium high and patient got Zometa infusion.  Improving.  3. High d-dimer -  Likely from malignancy.  If patient develops any leg pain or new swelling, will check venous Doppler.  On prophylactic Lovenox.  4. Anemia -    Iron sat low. Stool Hemoccult pending and iron replacement initiated.  5. HTNCAD/PVD -Plavix and aspirin held.  Prophylactic Lovenox initiated pending biopsies.  6. DM -  Per primary care.       PLAN:  1. Await neurosurgery  evaluation.  2. Bronch with biopsy.  3. Follow hemoglobin and calcium levels.        Proper PPE worn given coronavirus pandemic. Electronically signed by SAMY Mohr, 05/01/20, 9:00 AM.    I have personally performed a face-to-face diagnostic evaluation via telehealth on this patient.  I have discussed the case with Isidro Perdue NP, have edited/reviewed the note,  and agree with the care plan.  Patient's pain is better controlled and on examination he still has a heart murmur.  Labs are significant for improved but still high calcium level.  CTs are revealing significant spinal disease particularly extensive in the C-spine.  Have asked neurosurgery's opinion regarding need for surgical fixation prior to radiation therapy.  A biopsy can be performed simultaneously or through bronchoscopy if spine surgery not performed.    I discussed the patients findings and my recommendations with patient.    Fili Cordova MD  05/01/20  08:49

## 2020-05-01 NOTE — PROGRESS NOTES
Continued Stay Note  NASEEM Shaw     Patient Name: Clemente Salinas  MRN: [id number]  Today's Date: 5/1/2020    Admit Date: 4/28/2020    Discharge Plan     Row Name 05/01/20 1339       Plan    Plan  Anticipate routine home with family     Plan Comments  DC barriers: pending MRI, bronch planned for Monday IV abx         Expected Discharge Date and Time     Expected Discharge Date Expected Discharge Time    May 5, 2020             Abeba Cruz RN

## 2020-05-02 NOTE — PLAN OF CARE
Problem: Patient Care Overview  Goal: Plan of Care Review  Outcome: Ongoing (interventions implemented as appropriate)  Flowsheets (Taken 5/2/2020 9435)  Progress: no change  Plan of Care Reviewed With: patient  Outcome Summary: Patient in bed eyes closed resting at this time.  No c/o noted.  Continues on IV ABT's.  Pleasant and cooperative.

## 2020-05-02 NOTE — PROGRESS NOTES
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  Talking to him I called him a Medrol Dosepak fortunately the medication not resolve his pain.  He called me with some vague symptoms.  He has been saying that he has had generalized weakness, a very mild cough, cage and shortness of breath, weight loss of 20 pounds or more and some nonspecific pain in the abdomen and chest.  After talking to him we have decided to send him to the emergency room.  Work-up in the emergency room indicated that his chest x-ray was abnormal and showed a right sugey-hilar and right basilar patchy density, indicative of pneumonia.  There was chronic changes in both shoulders.  A CT of the chest PE protocol showed no evidence of pulmonary embolism.  Also showed a dense masslike consolidation of the apical segment of the right lower lobe, measuring approximately 3 cm x 3.8 cm, with surrounding patchy groundglass opacities.  But the CT of the chest indicated views lytic osseous lesions in the right posterior element of the C7 vertebral segment with associated stenosis of the right C6-7 and C7-T1 neural woods.  There is also lytic lesions in the T8, T12 and L1 vertebrae.  There were multiple large lytic lesions in the left scapula.  His comprehensive metabolic panel showed a calcium of 12.6, his ionized calcium was 1.57.  His COVID-19 was not detected.  His respiratory panel PCR was negative.  His white count was 8600 with a hemoglobin of 12.7.Patient is a 74 y.o.   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  Talking to him I called him a Medrol Dosepak fortunately the medication not resolve his pain.  He called me with some vague symptoms.  He has been saying that he has had generalized weakness, a very mild cough, cage and shortness of breath, weight loss of 20 pounds or more and some nonspecific pain in the abdomen and chest.  After talking to him we have decided to send him to the emergency room.  Work-up in the emergency room indicated that his chest x-ray was abnormal and showed a right sugey-hilar and right basilar patchy density, indicative of pneumonia.  There was chronic changes in both shoulders.  A CT of the chest PE protocol showed no evidence of pulmonary embolism.  Also showed a dense masslike consolidation of the apical segment of the right lower lobe, measuring approximately 3 cm x 3.8 cm, with surrounding patchy groundglass opacities.  But the CT of the chest indicated views lytic osseous lesions in the right posterior element of the C7 vertebral segment with associated stenosis of the right C6-7 and C7-T1 neural woods.  There is also lytic lesions in the T8, T12 and L1 vertebrae.  There were multiple large lytic lesions in the left scapula.  His comprehensive metabolic panel showed a calcium of 12.6, his ionized calcium was 1.57.  His COVID-19 was not detected.  His respiratory panel PCR was negative.  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  Talking to him I called him a Medrol Dosepak fortunately the medication not resolve his pain.  He called me with some vague symptoms.  He has been saying that he has had generalized weakness, a very mild  cough, cage and shortness of breath, weight loss of 20 pounds or more and some nonspecific pain in the abdomen and chest.  After talking to him we have decided to send him to the emergency room.  Work-up in the emergency room indicated that his chest x-ray was abnormal and showed a right sugey-hilar and right basilar patchy density, indicative of pneumonia.  There was chronic changes in both shoulders.  A CT of the chest PE protocol showed no evidence of pulmonary embolism.  Also showed a dense masslike consolidation of the apical segment of the right lower lobe, measuring approximately 3 cm x 3.8 cm, with surrounding patchy groundglass opacities.  But the CT of the chest indicated views lytic osseous lesions in the right posterior element of the C7 vertebral segment with associated stenosis of the right C6-7 and C7-T1 neural woods.  There is also lytic lesions in the T8, T12 and L1 vertebrae.  There were multiple large lytic lesions in the left scapula.  His comprehensive metabolic panel showed a calcium of 12.6, his ionized calcium was 1.57.  His COVID-19 was not detected.  His respiratory panel PCR was negative.  His white count was 8600 with a hemoglobin of 12.7.  Further examinations today has shown that the patient has lytic lesions in the thoracic and lumbar regions.  CT of the abdomen and pelvis showed that there is small lesions in the liver.  CT scan of the head was normal.    MRI of the brain showed Single 7 mm focus of enhancement in the subcortical left frontal lobe,  likely a metastatic lesion. However, given the single lesion and linear  shape along the cortex, subacute infarct is possible, but considered  less likely. No associated restricted diffusion.    Patient Complaints: Patient complains of some pain he is feeling very anxious and sad and feels distressed.  His pain when he is trying to ambulate.  Trouble sleeping last night.  He has minimal cough.  There is no hemoptysis.  There is no painful  "breathing.  Appetite is poor.  History taken from: patient    Review of Systems   Constitutional: Positive for appetite change and unexpected weight change.   HENT: Negative.    Eyes: Negative.    Respiratory: Negative.    Cardiovascular: Negative.    Gastrointestinal: Negative.    Endocrine: Negative.    Genitourinary: Negative.    Musculoskeletal: Positive for arthralgias, back pain and neck pain.   Skin: Negative.    Allergic/Immunologic: Negative.    Neurological: Negative.    Hematological: Negative.    Psychiatric/Behavioral: Positive for sleep disturbance. The patient is nervous/anxious.         Depressed mood           Objective     Vital Signs  /79 (BP Location: Right arm, Patient Position: Sitting)   Pulse 70   Temp 97.5 °F (36.4 °C) (Oral)   Resp 16   Ht 175.3 cm (69\")   Wt 87.1 kg (192 lb 0.3 oz)   SpO2 97%   BMI 28.36 kg/m²       Physical Exam:     General Appearance:    Alert, cooperative, in no acute distress   Head:    Normocephalic, without obvious abnormality, atraumatic   Eyes:            Lids and lashes normal, conjunctivae and sclerae normal, no   icterus, no pallor, corneas clear, PERRLA   Ears:    Ears appear intact with no abnormalities noted   Throat:   No oral lesions, no thrush, oral mucosa moist   Neck:   No adenopathy, supple, trachea midline, no thyromegaly, no   carotid bruit, no JVD   Lungs:     Clear to auscultation,respirations regular, even and                  unlabored    Heart:    Regular rhythm and normal rate, normal S1 and S2, no            murmur, no gallop, no rub, no click   Chest Wall:    No abnormalities observed   Abdomen:     Normal bowel sounds, no masses, no organomegaly, soft        non-tender, non-distended, no guarding, no rebound                tenderness   Extremities:   Moves all extremities well, no edema, no cyanosis, no             redness   Pulses:   Pulses palpable and equal bilaterally   Skin:   No bleeding, bruising or rash   Lymph nodes:   " No palpable adenopathy   Neurologic:   Cranial nerves 2 - 12 grossly intact, sensation intact, DTR       present and equal bilaterally        Results Review:    Lab Results (last 24 hours)     Procedure Component Value Units Date/Time    POC Glucose Once [[id number]]  (Abnormal) Collected:  05/02/20 1145    Specimen:  Blood Updated:  05/02/20 1147     Glucose 306 mg/dL      Comment: Serial Number: [id number]:  [id number]       POC Glucose Once [[id number]]  (Abnormal) Collected:  05/02/20 0719    Specimen:  Blood Updated:  05/02/20 0720     Glucose 270 mg/dL      Comment: Serial Number: [id number]:  [id number]       POC Glucose Once [[id number]]  (Abnormal) Collected:  05/01/20 2025    Specimen:  Blood Updated:  05/01/20 2026     Glucose 269 mg/dL      Comment: Serial Number: [id number]:  [id number]       POC Glucose Once [[id number]]  (Abnormal) Collected:  05/01/20 1623    Specimen:  Blood Updated:  05/01/20 1625     Glucose 302 mg/dL      Comment: Serial Number: [id number]:  [id number]              ECG/EMG Results (last 24 hours)     ** No results found for the last 24 hours. **          Imaging Results (Last 24 Hours)     Procedure Component Value Units Date/Time    MRI Brain With & Without Contrast [[id number]] Collected:  05/01/20 1424     Updated:  05/01/20 1434    Narrative:          DATE OF EXAM:   5/1/2020 11:10 AM     PROCEDURE:   MRI BRAIN W WO CONTRAST-     INDICATIONS:   metastatic disease; C34.91-Malignant neoplasm of unspecified part of  right bronchus or lung; C79.9-Secondary malignant neoplasm of  unspecified site     COMPARISON:  CT head without and with contrast 04/30/2020.     TECHNIQUE:   Routine magnetic resonance imaging was obtained of the brain before and  after the administration of 18 mL of ProHance contrast intravenously.     FINDINGS:   No restricted diffusion is seen to suggest acute or subacute ischemia.  There is linear restricted diffusion in the subcortical left  frontal  lobe measuring 3 mm on axial image 1 11 x 7 mm on sagittal image 16. No  additional enhancing foci are identified in the brain. The ventricular  system is nondilated. Minimal periventricular/subcortical white matter  T2/FLAIR hyperintensities are nonspecific, but are most commonly seen in  the setting of chronic small vessel ischemic change. No intracranial  hemorrhage or extra-axial collection is seen. Signal voids in the  vessels the skull base appear unremarkable. Paranasal sinuses and  mastoid air cells appear clear.        Impression:       Single 7 mm focus of enhancement in the subcortical left frontal lobe,  likely a metastatic lesion. However, given the single lesion and linear  shape along the cortex, subacute infarct is possible, but considered  less likely. No associated restricted diffusion.     Electronically Signed By-Jasmyn Perez On:5/1/2020 2:30 PM  This report was finalized on [id number] by  Jasmyn Perez, .    MRI Thoracic Spine With & Without Contrast [[id number]] Collected:  05/01/20 1404     Updated:  05/01/20 1413    Narrative:       MRI THORACIC SPINE W WO CONTRAST-     Date of Exam: 5/1/2020 11:55 AM     Indication: metastatic disease; C34.91-Malignant neoplasm of unspecified  part of right bronchus or lung; C79.9-Secondary malignant neoplasm of  unspecified site     Comparison: MRI cervical spine same date, CT thoracic spine 04/30/2020     Technique: Multiplanar multisequence images of the thoracic spine were  performed prior to and after uneventful administration of 18 ml Prohance  IV contrast.     FINDINGS:   There is heterogeneous bone marrow signal intensity. There are enhancing  masses seen throughout the thoracic spine. Largest lesion within the  spinous process of T5 with mild expansion. It has peripheral  enhancement. It measures 3.6 x 2.3 x 2.4 cm. There is normal signal  intensity of the thoracic spinal cord. There are no enhancing epidural  masses there is no  significant central canal narrowing. There are  enhancing lesions involving many of the ribs.     T1-T2: No significant central canal or neural foraminal narrowing.     T2-T3: No significant central canal or neural foraminal narrowing.     T3-T4: No significant central canal or neural foraminal narrowing.     T4-T5: No significant central canal or neural foraminal narrowing.     T5-T6: No significant central canal or neural foraminal narrowing.     T6-T7: No significant central canal or neural foraminal narrowing.     T7-T8: No significant central canal or neural foraminal narrowing.     T8-T9: No significant central canal or neural foraminal narrowing.     T9-T10: No significant central canal or neural foraminal narrowing.     T10-T11: No significant central canal or neural foraminal narrowing.     T12-L1: No significant central canal or neural foraminal narrowing.       Impression:       Diffuse osseous metastatic disease throughout the thoracic spine and  ribs. No significant central canal or neural foraminal narrowing  identified on this exam. Largest expansile lesion is seen at the spinous  process of T5.     Electronically Signed By-Heidi Montero MD On:5/1/2020 2:11 PM  This report was finalized on [id number] by  Heidi Montero MD.    MRI Cervical Spine With & Without Contrast [[id number]] Collected:  05/01/20 1341     Updated:  05/01/20 1406    Narrative:       MRI CERVICAL SPINE W WO CONTRAST-     Date of Exam: 5/1/2020 11:54 AM     Indication: metastatic disease; C34.91-Malignant neoplasm of unspecified  part of right bronchus or lung; C79.9-Secondary malignant neoplasm of  unspecified site     Comparison: CT cervical spine 04/30/2020     Technique: Multiplanar multisequence images of the cervical spine were  performed prior to and after uneventful administration of 18 ml Prohance  IV contrast according to routine cervical spine MRI protocol.     FINDINGS:   There is diffuse osseous metastatic disease. There  is low T1 high T2  signal intensity replacing the entire C5 vertebral body at site of lytic  lesion seen on CT. There is abnormal low T1 high T2 signal intensity  involving the C6-C7 vertebral bodies there is osseous fusion of this  level. Abnormal signal intensity and mass is also seen at the right  posterior elements of T2. There is extension to the right posterior  elements at the C6-C7 level with cortical destruction and there is  extension into the left posterior elements at the C5 level. There is  enhancing mass expanding and destroying the right lamina and pedicle at  the C6-C7 level. This measures about 3.3 x 3.4 x 3.2 cm.       There is no significant enhancement of the cervical spinal cord or  epidural space. There is abnormal signal intensity in the right second  rib posteriorly and less pronounced in the left second rib.     There is mild disc desiccation. The alignment is anatomic. There is mild  loss of disc height C3 C4 C4 C5 C5 C6. Osseous fusion of C6-C7 level.     C1-C2: Within normal limits.     C2-C3: No significant central canal or neural foraminal narrowing.  Uncovertebral joints are normal.     C3-C4: Small disc osteophyte complex. Mild central narrowing. Moderate  right and mild left neural foraminal narrowing. Mild facet degenerative  change.     C4-C5: Small disc osteophyte complex. Mild central narrowing. Probable  left severe neural foraminal narrowing due to the enhancing mass base to  the facet with soft tissue extension. Mild right neural foraminal  narrowing. Mild facet degenerative change.     C5-C6: Enhancing mass centered at the right facet. The neural foramina  is not well seen and there is probable severe right neuroforaminal  narrowing. There is mild left neural foraminal narrowing.     C6-C7: Enhancing mass likely causing severe right neural foraminal  narrowing. Mild central narrowing and mild left neural foraminal  narrowing.     C7-T1: No significant central canal or neural  foraminal narrowing.  Moderate facet degenerative change. Fluid within the facets on the left.  Uncovertebral joints are normal.       Impression:       1.There are extensive osseous metastatic lesions with osseous  destruction and soft tissue extension most pronounced on the right at  the C6-C7 level. These correspond to lytic lesions seen on CT.  2.There is no abnormal enhancement of the epidural space or cervical  spinal cord.  3.There is no significant central canal narrowing.  4.The evaluation of neural foramina is limited on this study. There is  neural foraminal narrowing suspected at the sites of osseous metastatic  destruction, specifically, left C4-C5 and right C5-C6 and right C6-C7.     Electronically Signed By-Heidi Montero MD On:5/1/2020 2:04 PM  This report was finalized on [id number] by  Heidi Montero MD.               I reviewed the patient's new clinical results.    Medication Review:   Scheduled Meds:    amLODIPine 10 mg Oral Daily   ampicillin-sulbactam 3 g Intravenous Q6H   atenolol 25 mg Oral BID   dexamethasone 4 mg Oral Q12H   enoxaparin 40 mg Subcutaneous Q24H   famotidine 20 mg Oral Daily   ferrous sulfate 324 mg Oral BID With Meals   finasteride 5 mg Oral Nightly   glipizide 10 mg Oral QAM AC   insulin lispro 0-7 Units Subcutaneous TID AC   isosorbide mononitrate 60 mg Oral Daily   losartan 100 mg Oral Daily   rosuvastatin 10 mg Oral Daily   sertraline 25 mg Oral Daily   sodium chloride 10 mL Intravenous Q12H     Continuous Infusions:   PRN Meds:.•  ALPRAZolam  •  calcium carbonate  •  dextrose  •  dextrose  •  dextrose  •  glucagon (human recombinant)  •  HYDROcodone-acetaminophen  •  insulin lispro **AND** insulin lispro  •  magnesium hydroxide  •  nitroglycerin  •  nitroglycerin  •  ondansetron  •  sennosides-docusate  •  [COMPLETED] Insert peripheral IV **AND** sodium chloride  •  sodium chloride  •  zolpidem     Assessment/Plan     1. Right lower lobe lung mass,  with lytic lesions  visible, most likely primary lung malignancy with metastasis.  2. Single 7 mm focus of enhancement in the subcortical left frontal lobe,likely a metastatic lesion      3. Neck pain, low back painand shoulder pain secondary to cervical / thoracic/ lumbar lytic lesions and herniation\neural foraminal narrowing suspected at theleft C4-C5 and right C5-C6 and right C6-C7  4. Remote history of tobacco use quit years ago history of 10 pack-year smoking/ History of asbestos exposure  5. Hypercalcemia.   6. High d-dimer.  7. Microcytic iron deficiency anemia  8. Diabetes mellitus type 2  9. Essential hypertension  10. Coronary artery disease with prior myocardial infarction  11. BPH asymptomatic  12. Mixed hyperlipidemia  13. Peripheral diabetic neuropathy  14. Vitamin D deficiency  15. History of coronary artery bypass graft  16. Atherosclerosis  17. Adjustment disorder with anxiety      Active Problems:    Malignant neoplasm of right lung (CMS/HCC)      We will manage his pain with Norco 5/325 1 every 6 hours as needed.  We will also give him zolpidem for sleep at night.  We will start him a small dose of sertraline.    Plan for disposition: Hopefully he will return to home    Sola Guerrero MD  05/02/20  12:39

## 2020-05-02 NOTE — PROGRESS NOTES
Daily Progress Note        Malignant neoplasm of right lung (CMS/HCC)      Assessment:    Lung mass bronchoalveolar markings in the superior segment of the right lower lobe differential diagnosis is cancer+/- pneumonia  No PE  COVID-19 test is negative  Viral panel is negative  Possible bone metastasis    Recommendations:     Navigation bronchoscopy on 5/4/2020 at 8 AM  Last Plavix on 04/28/2020  Discussed with hematology oncology and primary team  Antibiotics  Unasyn             LOS: 2 days     Subjective     Objective     Vital signs for last 24 hours:  Vitals:    05/01/20 2009 05/02/20 0423 05/02/20 0739 05/02/20 0800   BP: 135/56 163/78 168/79    BP Location: Right arm Right arm Right arm    Patient Position: Lying Lying Sitting    Pulse: 70 68 69 70   Resp: 18 18 16    Temp: 97.8 °F (36.6 °C) 97.7 °F (36.5 °C) 97.5 °F (36.4 °C)    TempSrc: Oral Oral Oral    SpO2: 96% 97% 97%    Weight:  87.1 kg (192 lb 0.3 oz)     Height:           Intake/Output last 3 shifts:  I/O last 3 completed shifts:  In: 680 [P.O.:480; IV Piggyback:200]  Out: 800 [Urine:800]  Intake/Output this shift:  I/O this shift:  In: 100 [IV Piggyback:100]  Out: -       Radiology  Imaging Results (Last 24 Hours)     Procedure Component Value Units Date/Time    MRI Brain With & Without Contrast [[id number]] Collected:  05/01/20 1424     Updated:  05/01/20 1434    Narrative:          DATE OF EXAM:   5/1/2020 11:10 AM     PROCEDURE:   MRI BRAIN W WO CONTRAST-     INDICATIONS:   metastatic disease; C34.91-Malignant neoplasm of unspecified part of  right bronchus or lung; C79.9-Secondary malignant neoplasm of  unspecified site     COMPARISON:  CT head without and with contrast 04/30/2020.     TECHNIQUE:   Routine magnetic resonance imaging was obtained of the brain before and  after the administration of 18 mL of ProHance contrast intravenously.     FINDINGS:   No restricted diffusion is seen to suggest acute or subacute ischemia.  There is linear  restricted diffusion in the subcortical left frontal  lobe measuring 3 mm on axial image 1 11 x 7 mm on sagittal image 16. No  additional enhancing foci are identified in the brain. The ventricular  system is nondilated. Minimal periventricular/subcortical white matter  T2/FLAIR hyperintensities are nonspecific, but are most commonly seen in  the setting of chronic small vessel ischemic change. No intracranial  hemorrhage or extra-axial collection is seen. Signal voids in the  vessels the skull base appear unremarkable. Paranasal sinuses and  mastoid air cells appear clear.        Impression:       Single 7 mm focus of enhancement in the subcortical left frontal lobe,  likely a metastatic lesion. However, given the single lesion and linear  shape along the cortex, subacute infarct is possible, but considered  less likely. No associated restricted diffusion.     Electronically Signed By-Jasmyn Perez On:5/1/2020 2:30 PM  This report was finalized on [id number] by  Jasmyn Perez, .    MRI Thoracic Spine With & Without Contrast [[id number]] Collected:  05/01/20 1404     Updated:  05/01/20 1413    Narrative:       MRI THORACIC SPINE W WO CONTRAST-     Date of Exam: 5/1/2020 11:55 AM     Indication: metastatic disease; C34.91-Malignant neoplasm of unspecified  part of right bronchus or lung; C79.9-Secondary malignant neoplasm of  unspecified site     Comparison: MRI cervical spine same date, CT thoracic spine 04/30/2020     Technique: Multiplanar multisequence images of the thoracic spine were  performed prior to and after uneventful administration of 18 ml Prohance  IV contrast.     FINDINGS:   There is heterogeneous bone marrow signal intensity. There are enhancing  masses seen throughout the thoracic spine. Largest lesion within the  spinous process of T5 with mild expansion. It has peripheral  enhancement. It measures 3.6 x 2.3 x 2.4 cm. There is normal signal  intensity of the thoracic spinal cord. There are no  enhancing epidural  masses there is no significant central canal narrowing. There are  enhancing lesions involving many of the ribs.     T1-T2: No significant central canal or neural foraminal narrowing.     T2-T3: No significant central canal or neural foraminal narrowing.     T3-T4: No significant central canal or neural foraminal narrowing.     T4-T5: No significant central canal or neural foraminal narrowing.     T5-T6: No significant central canal or neural foraminal narrowing.     T6-T7: No significant central canal or neural foraminal narrowing.     T7-T8: No significant central canal or neural foraminal narrowing.     T8-T9: No significant central canal or neural foraminal narrowing.     T9-T10: No significant central canal or neural foraminal narrowing.     T10-T11: No significant central canal or neural foraminal narrowing.     T12-L1: No significant central canal or neural foraminal narrowing.       Impression:       Diffuse osseous metastatic disease throughout the thoracic spine and  ribs. No significant central canal or neural foraminal narrowing  identified on this exam. Largest expansile lesion is seen at the spinous  process of T5.     Electronically Signed By-Heidi Montero MD On:5/1/2020 2:11 PM  This report was finalized on [id number] by  Heidi Montero MD.    MRI Cervical Spine With & Without Contrast [[id number]] Collected:  05/01/20 1341     Updated:  05/01/20 1406    Narrative:       MRI CERVICAL SPINE W WO CONTRAST-     Date of Exam: 5/1/2020 11:54 AM     Indication: metastatic disease; C34.91-Malignant neoplasm of unspecified  part of right bronchus or lung; C79.9-Secondary malignant neoplasm of  unspecified site     Comparison: CT cervical spine 04/30/2020     Technique: Multiplanar multisequence images of the cervical spine were  performed prior to and after uneventful administration of 18 ml Prohance  IV contrast according to routine cervical spine MRI protocol.     FINDINGS:   There is  diffuse osseous metastatic disease. There is low T1 high T2  signal intensity replacing the entire C5 vertebral body at site of lytic  lesion seen on CT. There is abnormal low T1 high T2 signal intensity  involving the C6-C7 vertebral bodies there is osseous fusion of this  level. Abnormal signal intensity and mass is also seen at the right  posterior elements of T2. There is extension to the right posterior  elements at the C6-C7 level with cortical destruction and there is  extension into the left posterior elements at the C5 level. There is  enhancing mass expanding and destroying the right lamina and pedicle at  the C6-C7 level. This measures about 3.3 x 3.4 x 3.2 cm.       There is no significant enhancement of the cervical spinal cord or  epidural space. There is abnormal signal intensity in the right second  rib posteriorly and less pronounced in the left second rib.     There is mild disc desiccation. The alignment is anatomic. There is mild  loss of disc height C3 C4 C4 C5 C5 C6. Osseous fusion of C6-C7 level.     C1-C2: Within normal limits.     C2-C3: No significant central canal or neural foraminal narrowing.  Uncovertebral joints are normal.     C3-C4: Small disc osteophyte complex. Mild central narrowing. Moderate  right and mild left neural foraminal narrowing. Mild facet degenerative  change.     C4-C5: Small disc osteophyte complex. Mild central narrowing. Probable  left severe neural foraminal narrowing due to the enhancing mass base to  the facet with soft tissue extension. Mild right neural foraminal  narrowing. Mild facet degenerative change.     C5-C6: Enhancing mass centered at the right facet. The neural foramina  is not well seen and there is probable severe right neuroforaminal  narrowing. There is mild left neural foraminal narrowing.     C6-C7: Enhancing mass likely causing severe right neural foraminal  narrowing. Mild central narrowing and mild left neural foraminal  narrowing.        C7-T1: No significant central canal or neural foraminal narrowing.  Moderate facet degenerative change. Fluid within the facets on the left.  Uncovertebral joints are normal.       Impression:       1.There are extensive osseous metastatic lesions with osseous  destruction and soft tissue extension most pronounced on the right at  the C6-C7 level. These correspond to lytic lesions seen on CT.  2.There is no abnormal enhancement of the epidural space or cervical  spinal cord.  3.There is no significant central canal narrowing.  4.The evaluation of neural foramina is limited on this study. There is  neural foraminal narrowing suspected at the sites of osseous metastatic  destruction, specifically, left C4-C5 and right C5-C6 and right C6-C7.     Electronically Signed By-Heidi Monteor MD On:5/1/2020 2:04 PM  This report was finalized on [id number] by  Heidi Montero MD.          Labs:  Results from last 7 days   Lab Units 05/01/20  0604   WBC 10*3/mm3 9.30   HEMOGLOBIN g/dL 12.1*   HEMATOCRIT % 35.5*   PLATELETS 10*3/mm3 202     Results from last 7 days   Lab Units 05/01/20  0604   SODIUM mmol/L 130*   POTASSIUM mmol/L 3.8   CHLORIDE mmol/L 93*   CO2 mmol/L 23.0   BUN mg/dL 15   CREATININE mg/dL 1.15   CALCIUM mg/dL 9.9   BILIRUBIN mg/dL 0.4   ALK PHOS U/L 156*   ALT (SGPT) U/L 14   AST (SGOT) U/L 22   GLUCOSE mg/dL 130*         Results from last 7 days   Lab Units 05/01/20  0604 04/28/20  1923   ALBUMIN g/dL 3.50 4.10     Results from last 7 days   Lab Units 04/28/20  1923   TROPONIN T ng/mL <0.010                           Meds:   SCHEDULE    amLODIPine 10 mg Oral Daily   ampicillin-sulbactam 3 g Intravenous Q6H   atenolol 25 mg Oral BID   dexamethasone 4 mg Oral Q12H   enoxaparin 40 mg Subcutaneous Q24H   famotidine 20 mg Oral Daily   ferrous sulfate 324 mg Oral BID With Meals   finasteride 5 mg Oral Nightly   glipizide 10 mg Oral QAM AC   insulin lispro 0-7 Units Subcutaneous TID AC   isosorbide mononitrate 60 mg  Oral Daily   losartan 100 mg Oral Daily   rosuvastatin 10 mg Oral Daily   sertraline 25 mg Oral Daily   sodium chloride 10 mL Intravenous Q12H     Infusions     PRNs  •  ALPRAZolam  •  calcium carbonate  •  dextrose  •  dextrose  •  dextrose  •  glucagon (human recombinant)  •  HYDROcodone-acetaminophen  •  insulin lispro **AND** insulin lispro  •  magnesium hydroxide  •  nitroglycerin  •  nitroglycerin  •  ondansetron  •  sennosides-docusate  •  [COMPLETED] Insert peripheral IV **AND** sodium chloride  •  sodium chloride  •  zolpidem    Physical Exam:  Physical Exam   Constitutional: He is oriented to person, place, and time.   Cardiovascular:   Murmur heard.  Pulmonary/Chest: No respiratory distress. He has wheezes. He exhibits no tenderness.   Abdominal: He exhibits no distension. There is no tenderness.   Neurological: He is alert and oriented to person, place, and time.   Skin: Skin is warm and dry.       ROS  Review of Systems   HENT: Positive for congestion. Negative for rhinorrhea and sneezing.    Respiratory: Positive for cough and shortness of breath. Negative for apnea, choking, chest tightness, wheezing and stridor.    Cardiovascular: Negative for leg swelling.             Total time spent with patient greater than: 1 Hour

## 2020-05-02 NOTE — PROGRESS NOTES
LOS: 2 days   Patient Care Team:  Sola Guerrero MD as PCP - General  Jeremi Blandon MD as PCP - Claims Attributed  Pj Hough MD as Consulting Physician (Cardiology)  Fili Cordova MD as Consulting Physician (Hematology and Oncology)    Chief Complaint: New diagnosis of lung cancer with spinal metastases    Subjective     This patient actually feels pretty good overall.  He does have some pain in his neck but it is not severe.    Interval History:     History taken from: patient chart    Objective      He has good movement in all 4 extremities    Vital Signs  Temp:  [97.5 °F (36.4 °C)-97.8 °F (36.6 °C)] 97.5 °F (36.4 °C)  Heart Rate:  [68-70] 70  Resp:  [16-18] 16  BP: (135-168)/(56-79) 168/79       Results Review:     I reviewed the patient's new clinical results.  I did look at his imaging which shows some metastases in his cervical and his thoracic spine as well as his lumbar spine.      Assessment/Plan       Malignant neoplasm of right lung (CMS/HCC)      I told the patient that at the current time we are not planning any surgery on his spine.  He is scheduled for bronchoscopy the day after tomorrow for diagnostic procedure.  We will follow-up after that and make further recommendations.      Naman Carlos MD  05/02/20  09:10

## 2020-05-02 NOTE — PLAN OF CARE
Problem: Patient Care Overview  Goal: Plan of Care Review  Outcome: Ongoing (interventions implemented as appropriate)  Flowsheets (Taken 5/2/2020 2899)  Plan of Care Reviewed With: patient  Note:   Discussed plan of care with pt including bronch w/ biopsy on Monday. A&Ox4. Denies pain, nausea, vomiting, diarrhea, or constipation. Pt states he lives at home with his wife. Denies dizziness or history of falls. Explained reason and use of antibiotics. Pt denies anxiety or fears of bronchoscopy.   Goal: Individualization and Mutuality  Outcome: Ongoing (interventions implemented as appropriate)  Goal: Discharge Needs Assessment  Outcome: Ongoing (interventions implemented as appropriate)  Goal: Interprofessional Rounds/Family Conf  Outcome: Ongoing (interventions implemented as appropriate)     Problem: Anxiety (Adult)  Goal: Identify Related Risk Factors and Signs and Symptoms  Outcome: Ongoing (interventions implemented as appropriate)  Goal: Reduction/Resolution  Outcome: Ongoing (interventions implemented as appropriate)     Problem: Pain, Acute (Adult)  Goal: Identify Related Risk Factors and Signs and Symptoms  Outcome: Ongoing (interventions implemented as appropriate)  Goal: Acceptable Pain Control/Comfort Level  Outcome: Ongoing (interventions implemented as appropriate)     Problem: Infection, Risk/Actual (Adult)  Goal: Identify Related Risk Factors and Signs and Symptoms  Outcome: Ongoing (interventions implemented as appropriate)  Goal: Infection Prevention/Resolution  Outcome: Ongoing (interventions implemented as appropriate)     Problem: Bronchoscopy (Adult)  Goal: Signs and Symptoms of Listed Potential Problems Will be Absent, Minimized or Managed (Bronchoscopy)  Outcome: Ongoing (interventions implemented as appropriate)  Goal: Anesthesia/Sedation Recovery  Outcome: Ongoing (interventions implemented as appropriate)

## 2020-05-02 NOTE — PROGRESS NOTES
Hematology/Oncology Inpatient Progress Note    PATIENT NAME: Clemente Salinas  : 1945  MRN: [id number]    CHIEF COMPLAINT: Right lower lobe lung mass    HISTORY OF PRESENT ILLNESS:    74 y.o. male admitted through the ED on 2020 complaining of bilateral shoulder pain and back pain.  It seemed to be worse on the left side.  He claims that he is been feeling weak for about a month prior to admission.  He had a cough without fever in 2019 and his wife had a cough also at that time which has since resolved. He had a course of steroids for the shoulder pain without relief. He has lost 20 pounds of weight since 2019.  He has shortness of air on exertion but no hemoptysis.  He denies nausea.  He has had chronic swelling of the left right foot after coronary artery bypass surgery.     20  Hematology/Oncology was consulted for lung mass.  Admission labs revealed calcium 12.6 (8.6-10.5), alkaline phosphatase 187 (), d-dimer 3.77 (0.17-0.59), WBC 8.6 hemoglobin 12.7 MCV 81.8 RDW 16.7 platelet count 261,000, RVP negative.  SARS-CoV-2 was not detected.  Chest x-ray revealed a right perihilar and basilar patchy density and CT chest reveals dense masslike consolidation in the apical segment of the right lower bowel with surrounding opacities.  Nodular opacities in both lungs were present.  Diffuse lytic osseous metastatic disease was present with large destructive destructive lesion in the right C 7 posterior elements with large lytic lesion of the T8, T12 and L1 vertebral bodies.  Possible metastatic soft tissue nodule in the lateral left chest wall/axilla was present.  Pulmonary consultation was obtained.     Patient is a retired teacher and quit smoking in .    Interval work-up  · 2020 iron 44 (), TIBC 350 (298-436), iron saturation 13 (20-50), ferritin 115.7 (), ionized calcium 1.57 (1.2-1.3).  · CT abdomen and pelvis showed 2 small liver lesions measuring up to 14  mm, indeterminate for cyst versus metastatic disease.  CT cervical, thoracic, and lumbar spine showed extensive lytic lesions.  There was a pathologic fracture at C5 with involvement of the left transverse process and transverse foramen.  There was a lytic lesion at C6-7. There were areas of cortical breakthrough and nondisplaced pathologic fracture in the lumbar spine.  No evidence of retropulsion or soft tissue tumoral extension into the osseous spinal canal.  CT head was negative for metastatic disease.  · MRI brain showed 11 x 7 mm subcortical left frontal lobe lesion felt possibly metastatic disease with differential also including subacute infarct.  No hemorrhage or edema and patient asymptomatic.  Plan to follow with repeat imaging as outpatient.  · MRI C and T-spine showed extensive osseous metastatic disease most pronounced on the right at C6-7.  There is no abnormal enhancement of epidural space or cervical spinal cord and no significant central canal narrowing.  There is limited evaluation of the neural foramina with suspected narrowing at sites of metastatic destruction including C4-C5, right C5-C6, and right C6-C7.  The largest lesion in the T-spine was at T5 with mild expansion which had peripheral enhancement and measured 3.6 x 2.3 x 2.4 cm.  There is no significant central canal narrowing or neural foraminal narrowing.  There were enhancing lesions involving many of the ribs.     PCP: Sola Guerrero MD    Subjective   Slept well. Pain controlled. Appetite good this morning.    ROS:  Review of Systems   Constitutional: Negative for activity change, chills, fatigue, fever and unexpected weight change.   HENT: Negative for congestion, dental problem, hearing loss, mouth sores, nosebleeds, sore throat and trouble swallowing.    Eyes: Negative for photophobia and visual disturbance.   Respiratory: Negative for cough, chest tightness and shortness of breath.    Cardiovascular: Negative for chest pain,  palpitations and leg swelling.   Gastrointestinal: Negative for abdominal distention, abdominal pain, blood in stool, constipation, diarrhea, nausea and vomiting.   Endocrine: Negative for cold intolerance and heat intolerance.   Genitourinary: Negative for decreased urine volume, difficulty urinating, frequency, hematuria and urgency.   Musculoskeletal: Positive for neck pain. Negative for arthralgias, back pain and gait problem.   Skin: Negative for rash and wound.   Neurological: Negative for dizziness, tremors, weakness, light-headedness, numbness and headaches.   Hematological: Negative for adenopathy. Does not bruise/bleed easily.   Psychiatric/Behavioral: Negative for confusion and hallucinations. The patient is not nervous/anxious.    All other systems reviewed and are negative.     MEDICATIONS:    Scheduled Meds:      amLODIPine 10 mg Oral Daily   ampicillin-sulbactam 3 g Intravenous Q6H   atenolol 25 mg Oral BID   dexamethasone 4 mg Oral Q12H   enoxaparin 40 mg Subcutaneous Q24H   famotidine 20 mg Oral Daily   ferrous sulfate 324 mg Oral BID With Meals   finasteride 5 mg Oral Nightly   glipizide 10 mg Oral QAM AC   insulin lispro 0-7 Units Subcutaneous TID AC   isosorbide mononitrate 60 mg Oral Daily   losartan 100 mg Oral Daily   rosuvastatin 10 mg Oral Daily   sertraline 25 mg Oral Daily   sodium chloride 10 mL Intravenous Q12H      Continuous Infusions:      PRN Meds:  •  ALPRAZolam  •  calcium carbonate  •  dextrose  •  dextrose  •  dextrose  •  glucagon (human recombinant)  •  HYDROcodone-acetaminophen  •  insulin lispro **AND** insulin lispro  •  magnesium hydroxide  •  nitroglycerin  •  nitroglycerin  •  ondansetron  •  sennosides-docusate  •  [COMPLETED] Insert peripheral IV **AND** sodium chloride  •  sodium chloride  •  zolpidem     ALLERGIES:  No Known Allergies    Objective    VITALS:   /79 (BP Location: Right arm, Patient Position: Sitting)   Pulse 70   Temp 97.5 °F (36.4 °C) (Oral)    "Resp 16   Ht 175.3 cm (69\")   Wt 87.1 kg (192 lb 0.3 oz)   SpO2 97%   BMI 28.36 kg/m²     PHYSICAL EXAM:  Physical Exam   Constitutional: He is oriented to person, place, and time. He appears well-developed and well-nourished. No distress.   HENT:   Head: Normocephalic and atraumatic.   Nose: Nose normal.   Mouth/Throat: Oropharynx is clear and moist. No oropharyngeal exudate.   Dental fillings   Eyes: Pupils are equal, round, and reactive to light. Conjunctivae and EOM are normal. No scleral icterus.   Eyeglasses   Neck: Normal range of motion. Neck supple.   Cardiovascular: Normal rate, regular rhythm and intact distal pulses.   No murmur heard.  Grade 2 systolic murmur, monitor leads   Pulmonary/Chest: Effort normal and breath sounds normal. No respiratory distress. He has no wheezes. He has no rales.   Abdominal: Soft. Bowel sounds are normal. He exhibits no distension and no mass. There is no tenderness. There is no guarding.   Genitourinary:   Genitourinary Comments: Deferred    Musculoskeletal: Normal range of motion. He exhibits no edema, tenderness or deformity.   RUE IV   Lymphadenopathy:     He has no cervical adenopathy.     He has no axillary adenopathy.        Right: No supraclavicular adenopathy present.        Left: No supraclavicular adenopathy present.   Neurological: He is alert and oriented to person, place, and time. Coordination normal.   Skin: Skin is warm and dry. No rash noted. He is not diaphoretic. No erythema. No pallor.   Psychiatric: He has a normal mood and affect. His behavior is normal. Thought content normal.   Nursing note and vitals reviewed.      RECENT LABS:  Lab Results (last 24 hours)     Procedure Component Value Units Date/Time    POC Glucose Once [[id number]]  (Abnormal) Collected:  05/02/20 0719    Specimen:  Blood Updated:  05/02/20 0720     Glucose 270 mg/dL      Comment: Serial Number: [id number]:  [id number]       POC Glucose Once [[id number]]  (Abnormal) " Collected:  05/01/20 2025    Specimen:  Blood Updated:  05/01/20 2026     Glucose 269 mg/dL      Comment: Serial Number: [id number]:  [id number]       POC Glucose Once [[id number]]  (Abnormal) Collected:  05/01/20 1623    Specimen:  Blood Updated:  05/01/20 1625     Glucose 302 mg/dL      Comment: Serial Number: [id number]:  [id number]             PENDING RESULTS:  stool Hemoccult    IMAGING REVIEWED:  Ct Head With & Without Contrast    Result Date: 4/30/2020  No acute intracranial abnormality identified. No CT evidence of intracranial metastatic disease. Please note that MRI is more sensitive.  Electronically Signed By-Jasmyn Perez On:4/30/2020 3:27 PM This report was finalized on [id number] by  Jasmyn Perez, .    Ct Cervical Spine With Contrast    Result Date: 4/30/2020  Multiple lytic lesions in the cervical spine, consistent with osseous metastatic disease. Of note, lytic lesion replacing the mid to left C5 vertebral body has associated pathologic endplate fracture with height loss on the left, as well as involvement of the left transverse process and transverse foramen. Additionally, lytic lesion in the right side of the C7 vertebral body extends to posterior elements on the right, including through the right C6-C7 facet joint.  I called the result to Dr. Cordova's NP at 4:00 PM on 04/30/2020.  Electronically Signed ByRadha Perez On:4/30/2020 4:04 PM This report was finalized on [id number] by  Jasmyn Perez, .    Ct Thoracic Spine With Contrast    Result Date: 4/30/2020  1. Extensive lytic metastatic disease involving the thoracic spine as detailed above. 2. No pathologic compression fractures or clear epidural soft tissue component is identified.  Electronically Signed By-Dallin Ivey On:4/30/2020 3:58 PM This report was finalized on [id number] by  Tommy Muñoz    Ct Lumbar Spine With Contrast    Result Date: 4/30/2020  1. Multiple lytic lesions throughout the lumbar spine  with areas of cortical breakthrough and nondisplaced pathologic fracture. 2. No evidence of retropulsion or soft tissue tumoral extension into the osseous spinal canal. 3. Multilevel degenerative disc disease with mild spinal canal stenosis at L4-5 and moderate to severe left and severe right-sided neural foraminal narrowing at L5-S1. 4. Please see concurrently performed CT abdomen and pelvis report from same date for additional description of the pelvic osseous metastatic disease.  Electronically Signed By-Dallin Ivey On:4/30/2020 3:44 PM This report was finalized on [id number] by  Dallin Ivey, .    Mri Brain With & Without Contrast    Result Date: 5/1/2020  Single 7 mm focus of enhancement in the subcortical left frontal lobe, likely a metastatic lesion. However, given the single lesion and linear shape along the cortex, subacute infarct is possible, but considered less likely. No associated restricted diffusion.  Electronically Signed By-Jasmyn Perez On:5/1/2020 2:30 PM This report was finalized on [id number] by  Jasmyn Perez, .    Mri Cervical Spine With & Without Contrast    Result Date: 5/1/2020  1.There are extensive osseous metastatic lesions with osseous destruction and soft tissue extension most pronounced on the right at the C6-C7 level. These correspond to lytic lesions seen on CT. 2.There is no abnormal enhancement of the epidural space or cervical spinal cord. 3.There is no significant central canal narrowing. 4.The evaluation of neural foramina is limited on this study. There is neural foraminal narrowing suspected at the sites of osseous metastatic destruction, specifically, left C4-C5 and right C5-C6 and right C6-C7.  Electronically Signed By-Heidi Montero MD On:5/1/2020 2:04 PM This report was finalized on [id number] by  Heidi Montero MD.    Mri Thoracic Spine With & Without Contrast    Result Date: 5/1/2020  Diffuse osseous metastatic disease throughout the thoracic spine and  ribs. No significant central canal or neural foraminal narrowing identified on this exam. Largest expansile lesion is seen at the spinous process of T5.  Electronically Signed By-Heidi Montero MD On:5/1/2020 2:11 PM This report was finalized on [id number] by  Heidi Montero MD.    Ct Abdomen Pelvis With Contrast    Result Date: 4/30/2020  1. Two small liver lesions measuring up to 14 mm which are indeterminant on CT. These may represent small cysts or metastatic disease. If it would change clinical management, MRI of the abdomen without and with IV contrast could be considered. 2. Partial visualization of the right lower lobe mass/consolidation. Please see dedicated CT chest report from 04/28/2024 for findings above the diaphragm. 3. Extensive lytic metastatic disease involving the lumbar spine and pelvis as detailed above. Please see additional dedicated CT spine reports for additional findings..    Electronically Signed By-Dallin Ivey On:4/30/2020 3:29 PM This report was finalized on [id number] by  Dallin Ivey, .      I have reviewed the patient's labs, imaging, reports, and other clinician documentation.    Assessment/Plan   ASSESSMENT:  1. Right lower lobe lung mass -  Along with other metastatic disease as well as lytic bone lesions, most likely primary lung malignancy with metastasis.    Extensive bony metastatic disease of the spine and ribs.  On dexamethasone twice daily per neurosurgery for spinal mets with work-up in progress and no plan for surgical intervention at this point.  Possible brain met on MRI without edema or hemorrhage and patient asymptomatic.  Navigational bronchoscopy with biopsy planned for 5/4.    2. Hypercalcemia -  Ionized calcium high and patient s/p Zometa infusion given 4/29.  Improving.  3. High d-dimer -  Likely from malignancy.  If patient develops any leg pain or new swelling, will check venous Doppler.  On prophylactic Lovenox.  4. Anemia -    Iron sat low. Stool  Hemoccult pending and iron replacement initiated.  5. HTNCAD/PVD -Plavix and aspirin held.  Prophylactic Lovenox initiated pending biopsies.  6. DM -  Per primary care.    PLAN:  1. Navigational bronchoscopy planned for Monday.  2. Follow hemoglobin and calcium levels.  3. Await pathology for treatment planning.  Likely primary lung cancer with bone and possible brain mets and will plan molecular studies to determine systemic treatment options.  4. Consider radiation oncology consultation.      Proper PPE worn given coronavirus pandemic. Electronically signed by SAMY Mohr, 05/02/20, 11:20 AM.    I have personally performed a face-to-face diagnostic evaluation via telehealth on this patient.  I have discussed the case with Isidro Perdue NP, have edited/reviewed the note,  and agree with the care plan.   Patient pain is under control and on examination he does have a systolic murmur.  His further work-up reveals bony lesions are through the spine with no definite cord compression.  Lesion in the brain is small and could be met but will need to be followed on outpatient scans.  Await biopsy on Monday.  Will have radiation oncology see patient on Monday also.    I discussed the patients findings and my recommendations with patient and consulting provider.    Fili Cordova MD  05/02/20  11:04

## 2020-05-03 NOTE — PLAN OF CARE
The patient experienced left lower back pain a couple times during the night, which was relieved with [place].  Vitals stable.  No other complaints.

## 2020-05-03 NOTE — PROGRESS NOTES
Hematology/Oncology Inpatient Progress Note    PATIENT NAME: Clemente Salinas  : 1945  MRN: [id number]    CHIEF COMPLAINT: Right lower lobe lung mass    HISTORY OF PRESENT ILLNESS:    74 y.o. male admitted through the ED on 2020 complaining of bilateral shoulder pain and back pain.  It seemed to be worse on the left side.  He claims that he is been feeling weak for about a month prior to admission.  He had a cough without fever in 2019 and his wife had a cough also at that time which has since resolved. He had a course of steroids for the shoulder pain without relief. He has lost 20 pounds of weight since 2019.  He has shortness of air on exertion but no hemoptysis.  He denies nausea.  He has had chronic swelling of the left right foot after coronary artery bypass surgery.     20  Hematology/Oncology was consulted for lung mass.  Admission labs revealed calcium 12.6 (8.6-10.5), alkaline phosphatase 187 (), d-dimer 3.77 (0.17-0.59), WBC 8.6 hemoglobin 12.7 MCV 81.8 RDW 16.7 platelet count 261,000, RVP negative.  SARS-CoV-2 was not detected.  Chest x-ray revealed a right perihilar and basilar patchy density and CT chest reveals dense masslike consolidation in the apical segment of the right lower bowel with surrounding opacities.  Nodular opacities in both lungs were present.  Diffuse lytic osseous metastatic disease was present with large destructive destructive lesion in the right C 7 posterior elements with large lytic lesion of the T8, T12 and L1 vertebral bodies.  Possible metastatic soft tissue nodule in the lateral left chest wall/axilla was present.  Pulmonary consultation was obtained.     Patient is a retired teacher and quit smoking in .    Interval work-up  · 2020 iron 44 (), TIBC 350 (298-436), iron saturation 13 (20-50), ferritin 115.7 (), ionized calcium 1.57 (1.2-1.3).  · CT abdomen and pelvis showed 2 small liver lesions measuring up to 14  mm, indeterminate for cyst versus metastatic disease.  CT cervical, thoracic, and lumbar spine showed extensive lytic lesions.  There was a pathologic fracture at C5 with involvement of the left transverse process and transverse foramen.  There was a lytic lesion at C6-7. There were areas of cortical breakthrough and nondisplaced pathologic fracture in the lumbar spine.  No evidence of retropulsion or soft tissue tumoral extension into the osseous spinal canal.  CT head was negative for metastatic disease.  · MRI brain showed 11 x 7 mm subcortical left frontal lobe lesion felt possibly metastatic disease with differential also including subacute infarct.  No hemorrhage or edema and patient asymptomatic.  Plan to follow with repeat imaging as outpatient.  · MRI C and T-spine showed extensive osseous metastatic disease most pronounced on the right at C6-7.  There is no abnormal enhancement of epidural space or cervical spinal cord and no significant central canal narrowing.  There is limited evaluation of the neural foramina with suspected narrowing at sites of metastatic destruction including C4-C5, right C5-C6, and right C6-C7.  The largest lesion in the T-spine was at T5 with mild expansion which had peripheral enhancement and measured 3.6 x 2.3 x 2.4 cm.  There is no significant central canal narrowing or neural foraminal narrowing.  There were enhancing lesions involving many of the ribs.     PCP: Sola Guerrero MD    Subjective   Sleeping well with Ambien.  Neck pain controlled with Norco.  Appetite is good. Walking some.    ROS:  Review of Systems   Constitutional: Negative for activity change, chills, fatigue, fever and unexpected weight change.   HENT: Negative for congestion, dental problem, hearing loss, mouth sores, nosebleeds, sore throat and trouble swallowing.    Eyes: Negative for photophobia and visual disturbance.   Respiratory: Negative for cough, chest tightness and shortness of breath.       Cardiovascular: Negative for chest pain, palpitations and leg swelling.   Gastrointestinal: Negative for abdominal distention, abdominal pain, blood in stool, constipation, diarrhea, nausea and vomiting.   Endocrine: Negative for cold intolerance and heat intolerance.   Genitourinary: Negative for decreased urine volume, difficulty urinating, frequency, hematuria and urgency.   Musculoskeletal: Positive for neck pain. Negative for arthralgias, back pain and gait problem.   Skin: Negative for rash and wound.   Neurological: Negative for dizziness, tremors, weakness, light-headedness, numbness and headaches.   Hematological: Negative for adenopathy. Does not bruise/bleed easily.   Psychiatric/Behavioral: Negative for confusion and hallucinations. The patient is not nervous/anxious.    All other systems reviewed and are negative.     MEDICATIONS:    Scheduled Meds:      amLODIPine 10 mg Oral Daily   ampicillin-sulbactam 3 g Intravenous Q6H   atenolol 25 mg Oral BID   dexamethasone 4 mg Oral Q12H   enoxaparin 40 mg Subcutaneous Q24H   famotidine 20 mg Oral Daily   ferrous sulfate 324 mg Oral BID With Meals   finasteride 5 mg Oral Nightly   glipizide 10 mg Oral QAM AC   insulin lispro 0-14 Units Subcutaneous TID AC   isosorbide mononitrate 60 mg Oral Daily   losartan 100 mg Oral Daily   rosuvastatin 10 mg Oral Daily   sertraline 25 mg Oral Daily   sodium chloride 10 mL Intravenous Q12H      Continuous Infusions:      PRN Meds:  •  ALPRAZolam  •  calcium carbonate  •  dextrose  •  dextrose  •  dextrose  •  glucagon (human recombinant)  •  HYDROcodone-acetaminophen  •  insulin lispro **AND** insulin lispro  •  magnesium hydroxide  •  nitroglycerin  •  nitroglycerin  •  ondansetron  •  sennosides-docusate  •  [COMPLETED] Insert peripheral IV **AND** sodium chloride  •  sodium chloride  •  zolpidem     ALLERGIES:  No Known Allergies    Objective    VITALS:   /55 (BP Location: Right arm, Patient Position: Lying)    "Pulse 64   Temp 97.4 °F (36.3 °C) (Oral)   Resp 12   Ht 175.3 cm (69\")   Wt 87.1 kg (192 lb 0.3 oz)   SpO2 96%   BMI 28.36 kg/m²     PHYSICAL EXAM:  Physical Exam   Constitutional: He is oriented to person, place, and time. He appears well-developed and well-nourished. No distress.   HENT:   Head: Normocephalic and atraumatic.   Nose: Nose normal.   Mouth/Throat: Oropharynx is clear and moist. No oropharyngeal exudate.   Dental fillings   Eyes: Pupils are equal, round, and reactive to light. Conjunctivae and EOM are normal. No scleral icterus.   Eyeglasses   Neck: Normal range of motion. Neck supple.   Cardiovascular: Normal rate, regular rhythm and intact distal pulses.   No murmur heard.  Grade 2 systolic murmur, monitor leads   Pulmonary/Chest: Effort normal and breath sounds normal. No respiratory distress. He has no wheezes. He has no rales.   Abdominal: Soft. Bowel sounds are normal. He exhibits no distension and no mass. There is no tenderness. There is no guarding.   Genitourinary:   Genitourinary Comments: Deferred    Musculoskeletal: Normal range of motion. He exhibits no edema, tenderness or deformity.   LUE IV   Lymphadenopathy:     He has no cervical adenopathy.     He has no axillary adenopathy.        Right: No supraclavicular adenopathy present.        Left: No supraclavicular adenopathy present.   Neurological: He is alert and oriented to person, place, and time. Coordination normal.   Skin: Skin is warm and dry. No rash noted. He is not diaphoretic. No erythema. No pallor.   Psychiatric: He has a normal mood and affect. His behavior is normal. Thought content normal.   Nursing note and vitals reviewed.      RECENT LABS:  Lab Results (last 24 hours)     Procedure Component Value Units Date/Time    POC Glucose Once [[id number]]  (Abnormal) Collected:  05/03/20 0706    Specimen:  Blood Updated:  05/03/20 0707     Glucose 313 mg/dL      Comment: Serial Number: [id number]:  [id number]       " Comprehensive Metabolic Panel [[id number]]  (Abnormal) Collected:  05/03/20 0425    Specimen:  Blood Updated:  05/03/20 0510     Glucose 350 mg/dL      BUN 28 mg/dL      Creatinine 1.15 mg/dL      Sodium 129 mmol/L      Potassium 4.2 mmol/L      Chloride 93 mmol/L      CO2 23.0 mmol/L      Calcium 8.6 mg/dL      Total Protein 6.1 g/dL      Albumin 3.50 g/dL      ALT (SGPT) 15 U/L      AST (SGOT) 17 U/L      Alkaline Phosphatase 141 U/L      Total Bilirubin 0.4 mg/dL      eGFR Non African Amer 63 mL/min/1.73      Globulin 2.6 gm/dL      A/G Ratio 1.3 g/dL      BUN/Creatinine Ratio 24.3     Anion Gap 13.0 mmol/L     Narrative:       GFR Normal >60  Chronic Kidney Disease <60  Kidney Failure <15      CBC & Differential [[id number]] Collected:  05/03/20 0425    Specimen:  Blood Updated:  05/03/20 0447    Narrative:       The following orders were created for panel order CBC & Differential.  Procedure                               Abnormality         Status                     ---------                               -----------         ------                     CBC Auto Differential[[id number]]        Abnormal            Final result                 Please view results for these tests on the individual orders.    CBC Auto Differential [[id number]]  (Abnormal) Collected:  05/03/20 0425    Specimen:  Blood Updated:  05/03/20 0447     WBC 10.10 10*3/mm3      RBC 3.86 10*6/mm3      Hemoglobin 10.9 g/dL      Hematocrit 30.3 %      MCV 78.5 fL      MCH 28.1 pg      MCHC 35.8 g/dL      RDW 16.2 %      RDW-SD 44.6 fl      MPV 7.9 fL      Platelets 217 10*3/mm3      Neutrophil % 90.8 %      Lymphocyte % 4.6 %      Monocyte % 4.5 %      Eosinophil % 0.0 %      Basophil % 0.1 %      Neutrophils, Absolute 9.20 10*3/mm3      Lymphocytes, Absolute 0.50 10*3/mm3      Monocytes, Absolute 0.50 10*3/mm3      Eosinophils, Absolute 0.00 10*3/mm3      Basophils, Absolute 0.00 10*3/mm3      nRBC 0.0 /100 WBC     POC Glucose Once [[id number]]   (Abnormal) Collected:  05/02/20 1709    Specimen:  Blood Updated:  05/02/20 1710     Glucose 301 mg/dL      Comment: Serial Number: [id number]:  [id number]       POC Glucose Once [[id number]]  (Abnormal) Collected:  05/02/20 1145    Specimen:  Blood Updated:  05/02/20 1147     Glucose 306 mg/dL      Comment: Serial Number: [id number]:  [id number]             PENDING RESULTS:  stool Hemoccult    IMAGING REVIEWED:  Mri Brain With & Without Contrast    Result Date: 5/1/2020  Single 7 mm focus of enhancement in the subcortical left frontal lobe, likely a metastatic lesion. However, given the single lesion and linear shape along the cortex, subacute infarct is possible, but considered less likely. No associated restricted diffusion.  Electronically Signed By-Jasmyn Perez On:5/1/2020 2:30 PM This report was finalized on [id number] by  Jasmyn Perez, .    Mri Cervical Spine With & Without Contrast    Result Date: 5/1/2020  1.There are extensive osseous metastatic lesions with osseous destruction and soft tissue extension most pronounced on the right at the C6-C7 level. These correspond to lytic lesions seen on CT. 2.There is no abnormal enhancement of the epidural space or cervical spinal cord. 3.There is no significant central canal narrowing. 4.The evaluation of neural foramina is limited on this study. There is neural foraminal narrowing suspected at the sites of osseous metastatic destruction, specifically, left C4-C5 and right C5-C6 and right C6-C7.  Electronically Signed By-Heidi Montero MD On:5/1/2020 2:04 PM This report was finalized on [id number] by  Heidi Montero MD.    Mri Thoracic Spine With & Without Contrast    Result Date: 5/1/2020  Diffuse osseous metastatic disease throughout the thoracic spine and ribs. No significant central canal or neural foraminal narrowing identified on this exam. Largest expansile lesion is seen at the spinous process of T5.  Electronically Signed By-Heidi Montero MD  On:5/1/2020 2:11 PM This report was finalized on [id number] by  Heidi Montero MD.      I have reviewed the patient's labs, imaging, reports, and other clinician documentation.    Assessment/Plan   ASSESSMENT:  1. Right lower lobe lung mass -  Along with other metastatic disease as well as lytic bone lesions, most likely primary lung malignancy with metastasis.    Has extensive bony metastatic disease of the spine and ribs.  On dexamethasone twice daily per neurosurgery for spinal mets with work-up in progress and no plan for surgical intervention at this point.  Possible brain met on MRI without edema or hemorrhage and patient asymptomatic.  Navigational bronchoscopy with biopsy planned for 5/4.    2. Hypercalcemia -  Ionized calcium high and patient s/p Zometa infusion given 4/29.  Calcium level has normalized.  3. High d-dimer -  Likely from malignancy.  If patient develops any leg pain or new swelling, will check venous Doppler.  On prophylactic Lovenox.  4. Anemia -    Iron sat low. Stool Hemoccult pending and iron replacement initiated.  5. HTNCAD/PVD -Plavix and aspirin held.  Prophylactic Lovenox initiated pending biopsies.  6. DM -  Per primary care.    PLAN:  1. Navigational bronchoscopy planned for Monday.  2. Follow hemoglobin and calcium levels.  3. Await pathology for treatment planning.  Likely primary lung cancer with bone and possible brain mets and will plan molecular studies to determine systemic treatment options.  4. Radiation oncology consultation in a.m.      Proper PPE worn given coronavirus pandemic.     I have personally performed a face-to-face diagnostic evaluation via telehealth on this patient.  I have discussed the case with Isidro Perdue NP, have edited/reviewed the note,  and agree with the care plan.   His neck pain is pretty well controlled with medications and on examination he does have a's heart murmur.  His labs are significant for mild anemia and high blood sugar secondary  to steroid use.  For what appears to be a lung malignancy, navigational bronchoscopy is planned for tomorrow.  Will get radiation oncology opinion about radiation to the spinal lesions.    I discussed the patients findings and my recommendations with patient and consulting provider.    Fili Cordova MD  05/03/20  10:10

## 2020-05-03 NOTE — PLAN OF CARE
Problem: Patient Care Overview  Goal: Plan of Care Review  Outcome: Ongoing (interventions implemented as appropriate)  Flowsheets  Taken 5/3/2020 1431  Progress: improving  Taken 5/3/2020 3654  Plan of Care Reviewed With: patient  Goal: Individualization and Mutuality  Outcome: Ongoing (interventions implemented as appropriate)  Goal: Discharge Needs Assessment  Outcome: Ongoing (interventions implemented as appropriate)  Goal: Interprofessional Rounds/Family Conf  Outcome: Ongoing (interventions implemented as appropriate)     Problem: Anxiety (Adult)  Goal: Identify Related Risk Factors and Signs and Symptoms  Outcome: Ongoing (interventions implemented as appropriate)  Goal: Reduction/Resolution  Outcome: Ongoing (interventions implemented as appropriate)  Flowsheets (Taken 5/3/2020 1431)  Reduction/Resolution: making progress toward outcome     Problem: Pain, Acute (Adult)  Goal: Identify Related Risk Factors and Signs and Symptoms  Outcome: Ongoing (interventions implemented as appropriate)  Goal: Acceptable Pain Control/Comfort Level  Outcome: Ongoing (interventions implemented as appropriate)  Flowsheets (Taken 5/3/2020 1431)  Acceptable Pain Control/Comfort Level: making progress toward outcome     Problem: Infection, Risk/Actual (Adult)  Goal: Identify Related Risk Factors and Signs and Symptoms  Outcome: Ongoing (interventions implemented as appropriate)  Goal: Infection Prevention/Resolution  Outcome: Ongoing (interventions implemented as appropriate)  Flowsheets (Taken 5/3/2020 1431)  Infection Prevention/Resolution: making progress toward outcome     Problem: Bronchoscopy (Adult)  Goal: Signs and Symptoms of Listed Potential Problems Will be Absent, Minimized or Managed (Bronchoscopy)  Outcome: Ongoing (interventions implemented as appropriate)  Goal: Anesthesia/Sedation Recovery  Outcome: Ongoing (interventions implemented as appropriate)

## 2020-05-03 NOTE — PROGRESS NOTES
Daily Progress Note        Malignant neoplasm of right lung (CMS/HCC)      Assessment:    Lung mass bronchoalveolar markings in the superior segment of the right lower lobe differential diagnosis is cancer+/- pneumonia  No PE  COVID-19 test is negative  Viral panel is negative  Possible bone metastasis    Recommendations:     Navigation bronchoscopy on 5/4/2020 at 8 AM  Last Plavix on 04/28/2020  Antibiotics  Unasyn             LOS: 3 days     Subjective     Objective     Vital signs for last 24 hours:  Vitals:    05/02/20 1911 05/03/20 0338 05/03/20 0752 05/03/20 0800   BP: 141/64 142/67 155/55    BP Location: Left arm Right arm Right arm    Patient Position: Sitting Sitting Lying    Pulse: 64 58 65 64   Resp: 16 12     Temp: 97.9 °F (36.6 °C) 97.4 °F (36.3 °C)     TempSrc: Oral Oral     SpO2: 96% 96%     Weight:       Height:           Intake/Output last 3 shifts:  I/O last 3 completed shifts:  In: 740 [P.O.:240; IV Piggyback:500]  Out: 1200 [Urine:1200]  Intake/Output this shift:  I/O this shift:  In: -   Out: 800 [Urine:800]      Radiology  Imaging Results (Last 24 Hours)     ** No results found for the last 24 hours. **          Labs:  Results from last 7 days   Lab Units 05/03/20  0425   WBC 10*3/mm3 10.10   HEMOGLOBIN g/dL 10.9*   HEMATOCRIT % 30.3*   PLATELETS 10*3/mm3 217     Results from last 7 days   Lab Units 05/03/20  0425   SODIUM mmol/L 129*   POTASSIUM mmol/L 4.2   CHLORIDE mmol/L 93*   CO2 mmol/L 23.0   BUN mg/dL 28*   CREATININE mg/dL 1.15   CALCIUM mg/dL 8.6   BILIRUBIN mg/dL 0.4   ALK PHOS U/L 141*   ALT (SGPT) U/L 15   AST (SGOT) U/L 17   GLUCOSE mg/dL 350*         Results from last 7 days   Lab Units 05/03/20  0425 05/01/20  0604 04/28/20  1923   ALBUMIN g/dL 3.50 3.50 4.10     Results from last 7 days   Lab Units 04/28/20  1923   TROPONIN T ng/mL <0.010                           Meds:   SCHEDULE    amLODIPine 10 mg Oral Daily   ampicillin-sulbactam 3 g Intravenous Q6H   atenolol 25 mg Oral BID    dexamethasone 4 mg Oral Q12H   enoxaparin 40 mg Subcutaneous Q24H   famotidine 20 mg Oral Daily   ferrous sulfate 324 mg Oral BID With Meals   finasteride 5 mg Oral Nightly   glipizide 10 mg Oral QAM AC   insulin lispro 0-14 Units Subcutaneous TID AC   isosorbide mononitrate 60 mg Oral Daily   losartan 100 mg Oral Daily   rosuvastatin 10 mg Oral Daily   sertraline 25 mg Oral Daily   sodium chloride 10 mL Intravenous Q12H     Infusions     PRNs  •  ALPRAZolam  •  calcium carbonate  •  dextrose  •  dextrose  •  dextrose  •  glucagon (human recombinant)  •  HYDROcodone-acetaminophen  •  insulin lispro **AND** insulin lispro  •  magnesium hydroxide  •  nitroglycerin  •  nitroglycerin  •  ondansetron  •  sennosides-docusate  •  [COMPLETED] Insert peripheral IV **AND** sodium chloride  •  sodium chloride  •  zolpidem    Physical Exam:  Physical Exam   Constitutional: He is oriented to person, place, and time.   Cardiovascular:   Murmur heard.  Pulmonary/Chest: No respiratory distress. He has wheezes. He exhibits no tenderness.   Abdominal: He exhibits no distension. There is no tenderness.   Neurological: He is alert and oriented to person, place, and time.   Skin: Skin is warm and dry.       ROS  Review of Systems   HENT: Negative for rhinorrhea and sneezing.    Respiratory: Positive for cough. Negative for apnea, choking, chest tightness, wheezing and stridor.    Cardiovascular: Negative for leg swelling.             Total time spent with patient greater than: 1 Hour

## 2020-05-03 NOTE — PROGRESS NOTES
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  Talking to him I called him a Medrol Dosepak fortunately the medication not resolve his pain.  He called me with some vague symptoms.  He has been saying that he has had generalized weakness, a very mild cough, cage and shortness of breath, weight loss of 20 pounds or more and some nonspecific pain in the abdomen and chest.  After talking to him we have decided to send him to the emergency room.  Work-up in the emergency room indicated that his chest x-ray was abnormal and showed a right sugey-hilar and right basilar patchy density, indicative of pneumonia.  There was chronic changes in both shoulders.  A CT of the chest PE protocol showed no evidence of pulmonary embolism.  Also showed a dense masslike consolidation of the apical segment of the right lower lobe, measuring approximately 3 cm x 3.8 cm, with surrounding patchy groundglass opacities.  But the CT of the chest indicated views lytic osseous lesions in the right posterior element of the C7 vertebral segment with associated stenosis of the right C6-7 and C7-T1 neural woods.  There is also lytic lesions in the T8, T12 and L1 vertebrae.  There were multiple large lytic lesions in the left scapula.  His comprehensive metabolic panel showed a calcium of 12.6, his ionized calcium was 1.57.  His COVID-19 was not detected.  His respiratory panel PCR was negative.  His white count was 8600 with a hemoglobin of 12.7.Patient is a 74 y.o.   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  Talking to him I called him a Medrol Dosepak fortunately the medication not resolve his pain.  He called me with some vague symptoms.  He has been saying that he has had generalized weakness, a very mild cough, cage and shortness of breath, weight loss of 20 pounds or more and some nonspecific pain in the abdomen and chest.  After talking to him we have decided to send him to the emergency room.  Work-up in the emergency room indicated that his chest x-ray was abnormal and showed a right sugey-hilar and right basilar patchy density, indicative of pneumonia.  There was chronic changes in both shoulders.  A CT of the chest PE protocol showed no evidence of pulmonary embolism.  Also showed a dense masslike consolidation of the apical segment of the right lower lobe, measuring approximately 3 cm x 3.8 cm, with surrounding patchy groundglass opacities.  But the CT of the chest indicated views lytic osseous lesions in the right posterior element of the C7 vertebral segment with associated stenosis of the right C6-7 and C7-T1 neural woods.  There is also lytic lesions in the T8, T12 and L1 vertebrae.  There were multiple large lytic lesions in the left scapula.  His comprehensive metabolic panel showed a calcium of 12.6, his ionized calcium was 1.57.  His COVID-19 was not detected.  His respiratory panel PCR was negative.  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  Talking to him I called him a Medrol Dosepak fortunately the medication not resolve his pain.  He called me with some vague symptoms.  He has been saying that he has had generalized weakness, a very mild  cough, cage and shortness of breath, weight loss of 20 pounds or more and some nonspecific pain in the abdomen and chest.  After talking to him we have decided to send him to the emergency room.  Work-up in the emergency room indicated that his chest x-ray was abnormal and showed a right sugey-hilar and right basilar patchy density, indicative of pneumonia.  There was chronic changes in both shoulders.  A CT of the chest PE protocol showed no evidence of pulmonary embolism.  Also showed a dense masslike consolidation of the apical segment of the right lower lobe, measuring approximately 3 cm x 3.8 cm, with surrounding patchy groundglass opacities.  But the CT of the chest indicated views lytic osseous lesions in the right posterior element of the C7 vertebral segment with associated stenosis of the right C6-7 and C7-T1 neural woods.  There is also lytic lesions in the T8, T12 and L1 vertebrae.  There were multiple large lytic lesions in the left scapula.  His comprehensive metabolic panel showed a calcium of 12.6, his ionized calcium was 1.57.  His COVID-19 was not detected.  His respiratory panel PCR was negative.  His white count was 8600 with a hemoglobin of 12.7.  Further examinations today has shown that the patient has lytic lesions in the thoracic and lumbar regions.  CT of the abdomen and pelvis showed that there is small lesions in the liver.  CT scan of the head was normal.    MRI of the brain showed Single 7 mm focus of enhancement in the subcortical left frontal lobe,  likely a metastatic lesion. However, given the single lesion and linear  shape along the cortex, subacute infarct is possible, but considered  less likely. No associated restricted diffusion.    Patient Complaints: Patient complains of some pain he is feeling very anxious and sad and feels distressed.  His pain when he is trying to ambulate.  Trouble sleeping last night.  He has minimal cough.  There is no hemoptysis.  There is no painful  "breathing.  Appetite is poor.  History taken from: patient    Review of Systems   Constitutional: Positive for appetite change and unexpected weight change.   HENT: Negative.    Eyes: Negative.    Respiratory: Negative.    Cardiovascular: Negative.    Gastrointestinal: Negative.    Endocrine: Negative.    Genitourinary: Negative.    Musculoskeletal: Positive for arthralgias, back pain and neck pain.   Skin: Negative.    Allergic/Immunologic: Negative.    Neurological: Negative.    Hematological: Negative.    Psychiatric/Behavioral: Positive for sleep disturbance. The patient is nervous/anxious.         Depressed mood           Objective     Vital Signs  /55 (BP Location: Right arm, Patient Position: Lying)   Pulse 64   Temp 97.4 °F (36.3 °C) (Oral)   Resp 12   Ht 175.3 cm (69\")   Wt 87.1 kg (192 lb 0.3 oz)   SpO2 96%   BMI 28.36 kg/m²       Physical Exam:     General Appearance:    Alert, cooperative, in no acute distress   Head:    Normocephalic, without obvious abnormality, atraumatic   Eyes:            Lids and lashes normal, conjunctivae and sclerae normal, no   icterus, no pallor, corneas clear, PERRLA   Ears:    Ears appear intact with no abnormalities noted   Throat:   No oral lesions, no thrush, oral mucosa moist   Neck:   No adenopathy, supple, trachea midline, no thyromegaly, no   carotid bruit, no JVD   Lungs:     Clear to auscultation,respirations regular, even and                  unlabored    Heart:    Regular rhythm and normal rate, normal S1 and S2, no            murmur, no gallop, no rub, no click   Chest Wall:    No abnormalities observed   Abdomen:     Normal bowel sounds, no masses, no organomegaly, soft        non-tender, non-distended, no guarding, no rebound                tenderness   Extremities:   Moves all extremities well, no edema, no cyanosis, no             redness   Pulses:   Pulses palpable and equal bilaterally   Skin:   No bleeding, bruising or rash   Lymph nodes:   No " palpable adenopathy   Neurologic:   Cranial nerves 2 - 12 grossly intact, sensation intact, DTR       present and equal bilaterally        Results Review:    Lab Results (last 24 hours)     Procedure Component Value Units Date/Time    POC Glucose Once [[id number]]  (Abnormal) Collected:  05/03/20 0706    Specimen:  Blood Updated:  05/03/20 0707     Glucose 313 mg/dL      Comment: Serial Number: [id number]:  [id number]       Comprehensive Metabolic Panel [[id number]]  (Abnormal) Collected:  05/03/20 0425    Specimen:  Blood Updated:  05/03/20 0510     Glucose 350 mg/dL      BUN 28 mg/dL      Creatinine 1.15 mg/dL      Sodium 129 mmol/L      Potassium 4.2 mmol/L      Chloride 93 mmol/L      CO2 23.0 mmol/L      Calcium 8.6 mg/dL      Total Protein 6.1 g/dL      Albumin 3.50 g/dL      ALT (SGPT) 15 U/L      AST (SGOT) 17 U/L      Alkaline Phosphatase 141 U/L      Total Bilirubin 0.4 mg/dL      eGFR Non African Amer 63 mL/min/1.73      Globulin 2.6 gm/dL      A/G Ratio 1.3 g/dL      BUN/Creatinine Ratio 24.3     Anion Gap 13.0 mmol/L     Narrative:       GFR Normal >60  Chronic Kidney Disease <60  Kidney Failure <15      CBC & Differential [[id number]] Collected:  05/03/20 0425    Specimen:  Blood Updated:  05/03/20 0447    Narrative:       The following orders were created for panel order CBC & Differential.  Procedure                               Abnormality         Status                     ---------                               -----------         ------                     CBC Auto Differential[[id number]]        Abnormal            Final result                 Please view results for these tests on the individual orders.    CBC Auto Differential [[id number]]  (Abnormal) Collected:  05/03/20 0425    Specimen:  Blood Updated:  05/03/20 0447     WBC 10.10 10*3/mm3      RBC 3.86 10*6/mm3      Hemoglobin 10.9 g/dL      Hematocrit 30.3 %      MCV 78.5 fL      MCH 28.1 pg      MCHC 35.8 g/dL      RDW 16.2 %       RDW-SD 44.6 fl      MPV 7.9 fL      Platelets 217 10*3/mm3      Neutrophil % 90.8 %      Lymphocyte % 4.6 %      Monocyte % 4.5 %      Eosinophil % 0.0 %      Basophil % 0.1 %      Neutrophils, Absolute 9.20 10*3/mm3      Lymphocytes, Absolute 0.50 10*3/mm3      Monocytes, Absolute 0.50 10*3/mm3      Eosinophils, Absolute 0.00 10*3/mm3      Basophils, Absolute 0.00 10*3/mm3      nRBC 0.0 /100 WBC     POC Glucose Once [[id number]]  (Abnormal) Collected:  05/02/20 1709    Specimen:  Blood Updated:  05/02/20 1710     Glucose 301 mg/dL      Comment: Serial Number: [id number]:  [id number]       POC Glucose Once [[id number]]  (Abnormal) Collected:  05/02/20 1145    Specimen:  Blood Updated:  05/02/20 1147     Glucose 306 mg/dL      Comment: Serial Number: [id number]:  [id number]              ECG/EMG Results (last 24 hours)     ** No results found for the last 24 hours. **          Imaging Results (Last 24 Hours)     ** No results found for the last 24 hours. **               I reviewed the patient's new clinical results.    Medication Review:   Scheduled Meds:    amLODIPine 10 mg Oral Daily   ampicillin-sulbactam 3 g Intravenous Q6H   atenolol 25 mg Oral BID   dexamethasone 4 mg Oral Q12H   enoxaparin 40 mg Subcutaneous Q24H   famotidine 20 mg Oral Daily   ferrous sulfate 324 mg Oral BID With Meals   finasteride 5 mg Oral Nightly   glipizide 10 mg Oral QAM AC   insulin lispro 0-14 Units Subcutaneous TID AC   isosorbide mononitrate 60 mg Oral Daily   losartan 100 mg Oral Daily   rosuvastatin 10 mg Oral Daily   sertraline 25 mg Oral Daily   sodium chloride 10 mL Intravenous Q12H     Continuous Infusions:   PRN Meds:.•  ALPRAZolam  •  calcium carbonate  •  dextrose  •  dextrose  •  dextrose  •  glucagon (human recombinant)  •  HYDROcodone-acetaminophen  •  insulin lispro **AND** insulin lispro  •  magnesium hydroxide  •  nitroglycerin  •  nitroglycerin  •  ondansetron  •  sennosides-docusate  •  [COMPLETED] Insert  peripheral IV **AND** sodium chloride  •  sodium chloride  •  zolpidem     Assessment/Plan     1. Right lower lobe lung mass,  with lytic lesions visible, most likely primary lung malignancy with metastasis.  2. Single 7 mm focus of enhancement in the subcortical left frontal lobe,likely a metastatic lesion      3. Neck pain, low back painand shoulder pain secondary to cervical / thoracic/ lumbar lytic lesions and herniation\neural foraminal narrowing suspected at theleft C4-C5 and right C5-C6 and right C6-C7  4. Remote history of tobacco use quit years ago history of 10 pack-year smoking/ History of asbestos exposure  5. Hypercalcemia.   6. High d-dimer.  7. Microcytic iron deficiency anemia  8. Diabetes mellitus type 2  9. Essential hypertension  10. Coronary artery disease with prior myocardial infarction  11. BPH asymptomatic  12. Mixed hyperlipidemia  13. Peripheral diabetic neuropathy  14. Vitamin D deficiency  15. History of coronary artery bypass graft  16. Atherosclerosis  17. Adjustment disorder with anxiety      Active Problems:    Malignant neoplasm of right lung (CMS/HCC)      The patient's pain is well managed.  He is very anxious for the upcoming biopsy tomorrow.    Plan for disposition: Hopefully he will return to home    Sola Guerrero MD  05/03/20  09:51

## 2020-05-04 NOTE — PLAN OF CARE
Problem: Patient Care Overview  Goal: Plan of Care Review  Outcome: Ongoing (interventions implemented as appropriate)  Flowsheets (Taken 5/4/2020 0519)  Progress: no change  Plan of Care Reviewed With: patient  Outcome Summary: Patient has rested well through the night. Asked for pain medicine once. Plan for bronchoscopy later today 5/4/20. Pantient's heart rate beomes bradycardic while sleeping. Then rebounds as he awakens. Will continue to monitor.

## 2020-05-04 NOTE — NURSING NOTE
Dr Cordova's nurse practicioner here to see patient, she utilizied video call and Dr Cordova spoke with pt briefly.

## 2020-05-04 NOTE — ANESTHESIA POSTPROCEDURE EVALUATION
Patient: Clemente Salinas    Procedure Summary     Date:  05/04/20 Room / Location:  [facility] ENDOSCOPY 2 / [facility] ENDOSCOPY    Anesthesia Start:  0823 Anesthesia Stop:  0945    Procedure:  BRONCHOSCOPY WITH BRONCHOALVEOLAR LAVAGE AND NAVIGATION WITH FINE NEEDLE ASPIRATION, BRUSHING, AND BIOPSY X1 AREA AND ULTRASOUND WITH FINE NEEDLE ASPIRATION (N/A Bronchus) Diagnosis:       Malignant neoplasm of right lung, unspecified part of lung (CMS/HCC)      (Malignant neoplasm of right lung, unspecified part of lung (CMS/HCC) [C34.91])    Surgeon:  Jeb Velasquez MD Provider:  Kaiser Mejía MD    Anesthesia Type:  MAC ASA Status:  3          Anesthesia Type: MAC    Vitals  Vitals Value Taken Time   /66 5/4/2020 10:32 AM   Temp     Pulse 65 5/4/2020 10:38 AM   Resp 20 5/4/2020 10:22 AM   SpO2 97 % 5/4/2020 10:38 AM   Vitals shown include unvalidated device data.        Post Anesthesia Care and Evaluation    Patient location during evaluation: PACU  Patient participation: complete - patient participated  Level of consciousness: awake  Pain score: 0 (See nurse's notes for pain score)  Pain management: adequate  Airway patency: patent  Anesthetic complications: No anesthetic complications  PONV Status: none  Cardiovascular status: acceptable  Respiratory status: acceptable  Hydration status: acceptable    Comments: Patient seen and examined postoperatively; vital signs stable; SpO2 greater than or equal to 90%; cardiopulmonary status stable; nausea/vomiting adequately controlled; pain adequately controlled; no apparent anesthesia complications; patient discharged from anesthesia care when discharge criteria were met

## 2020-05-04 NOTE — PROGRESS NOTES
Hematology/Oncology Inpatient Progress Note    PATIENT NAME: Clemente Salinas  : 1945  MRN: [id number]    CHIEF COMPLAINT: Right lower lobe lung mass    HISTORY OF PRESENT ILLNESS:    74 y.o. male admitted through the ED on 2020 complaining of bilateral shoulder pain and back pain.  It seemed to be worse on the left side.  He claims that he is been feeling weak for about a month prior to admission.  He had a cough without fever in 2019 and his wife had a cough also at that time which has since resolved. He had a course of steroids for the shoulder pain without relief. He has lost 20 pounds of weight since 2019.  He has shortness of air on exertion but no hemoptysis.  He denies nausea.  He has had chronic swelling of the left right foot after coronary artery bypass surgery.     20  Hematology/Oncology was consulted for lung mass.  Admission labs revealed calcium 12.6 (8.6-10.5), alkaline phosphatase 187 (), d-dimer 3.77 (0.17-0.59), WBC 8.6 hemoglobin 12.7 MCV 81.8 RDW 16.7 platelet count 261,000, RVP negative.  SARS-CoV-2 was not detected.  Chest x-ray revealed a right perihilar and basilar patchy density and CT chest reveals dense masslike consolidation in the apical segment of the right lower bowel with surrounding opacities.  Nodular opacities in both lungs were present.  Diffuse lytic osseous metastatic disease was present with large destructive destructive lesion in the right C 7 posterior elements with large lytic lesion of the T8, T12 and L1 vertebral bodies.  Possible metastatic soft tissue nodule in the lateral left chest wall/axilla was present.  Pulmonary consultation was obtained.     Patient is a retired teacher and quit smoking in .    Interval work-up  · 2020 iron 44 (), TIBC 350 (298-436), iron saturation 13 (20-50), ferritin 115.7 (), ionized calcium 1.57 (1.2-1.3).  · CT abdomen and pelvis showed 2 small liver lesions measuring up to 14  mm, indeterminate for cyst versus metastatic disease.  CT cervical, thoracic, and lumbar spine showed extensive lytic lesions.  There was a pathologic fracture at C5 with involvement of the left transverse process and transverse foramen.  There was a lytic lesion at C6-7. There were areas of cortical breakthrough and nondisplaced pathologic fracture in the lumbar spine.  No evidence of retropulsion or soft tissue tumoral extension into the osseous spinal canal.  CT head was negative for metastatic disease.  · MRI brain showed 11 x 7 mm subcortical left frontal lobe lesion felt possibly metastatic disease with differential also including subacute infarct.  No hemorrhage or edema and patient asymptomatic.  Plan to follow with repeat imaging as outpatient.  · MRI C and T-spine showed extensive osseous metastatic disease most pronounced on the right at C6-7.  There is no abnormal enhancement of epidural space or cervical spinal cord and no significant central canal narrowing.  There is limited evaluation of the neural foramina with suspected narrowing at sites of metastatic destruction including C4-C5, right C5-C6, and right C6-C7.  The largest lesion in the T-spine was at T5 with mild expansion which had peripheral enhancement and measured 3.6 x 2.3 x 2.4 cm.  There is no significant central canal narrowing or neural foraminal narrowing.  There were enhancing lesions involving many of the ribs.  · 5/4/2020 navigational bronchoscopy by Dr. Velasquez: FNA from right lower lobe mass positive for non-small cell lung cancer possible adeno. FNA from R12 lobar lymph node suspicious for malignancy.     PCP: Sola Guerrero MD    Subjective   Sleepy postprocedure and complains of dry mouth, dry mouth.    ROS:  Review of Systems   Constitutional: Negative for activity change, chills, fatigue, fever and unexpected weight change.   HENT: Negative for congestion, dental problem, hearing loss, mouth sores, nosebleeds, sore throat and  trouble swallowing.         Dry mouth   Eyes: Negative for photophobia and visual disturbance.   Respiratory: Negative for cough, chest tightness and shortness of breath.    Cardiovascular: Negative for chest pain, palpitations and leg swelling.   Gastrointestinal: Negative for abdominal distention, abdominal pain, blood in stool, constipation, diarrhea, nausea and vomiting.   Endocrine: Negative for cold intolerance and heat intolerance.   Genitourinary: Negative for decreased urine volume, difficulty urinating, frequency, hematuria and urgency.   Musculoskeletal: Positive for neck pain. Negative for arthralgias, back pain and gait problem.   Skin: Negative for rash and wound.   Neurological: Negative for dizziness, tremors, weakness, light-headedness, numbness and headaches.   Hematological: Negative for adenopathy. Does not bruise/bleed easily.   Psychiatric/Behavioral: Negative for confusion and hallucinations. The patient is not nervous/anxious.    All other systems reviewed and are negative.     MEDICATIONS:    Scheduled Meds:      ALPRAZolam 1 mg Oral Nightly   amLODIPine 10 mg Oral Daily   ampicillin-sulbactam 3 g Intravenous Q6H   atenolol 25 mg Oral BID   dexamethasone 4 mg Oral Q12H   enoxaparin 40 mg Subcutaneous Q24H   famotidine 20 mg Oral Daily   ferrous sulfate 324 mg Oral BID With Meals   finasteride 5 mg Oral Nightly   glipizide 10 mg Oral QAM AC   insulin lispro 0-14 Units Subcutaneous TID AC   insulin lispro 10 Units Subcutaneous TID With Meals   insulin regular 4 Units Subcutaneous BID   isosorbide mononitrate 60 mg Oral Daily   lidocaine      losartan 100 mg Oral Daily   rosuvastatin 10 mg Oral Daily   sertraline 25 mg Oral Daily   sodium chloride 10 mL Intravenous Q12H      Continuous Infusions:      sodium chloride 9 mL/hr Last Rate: 9 mL/hr (05/04/20 0805)      PRN Meds:  •  ALPRAZolam  •  calcium carbonate  •  dextrose  •  dextrose  •  dextrose  •  glucagon (human recombinant)  •   "HYDROcodone-acetaminophen  •  insulin lispro **AND** insulin lispro  •  magnesium hydroxide  •  nitroglycerin  •  nitroglycerin  •  ondansetron  •  promethazine **OR** promethazine **OR** [DISCONTINUED] promethazine **OR** [DISCONTINUED] promethazine  •  sennosides-docusate  •  [COMPLETED] Insert peripheral IV **AND** sodium chloride  •  sodium chloride  •  sodium chloride  •  zolpidem     ALLERGIES:  No Known Allergies    Objective    VITALS:   /46 (BP Location: Right arm, Patient Position: Lying)   Pulse 66   Temp 96.9 °F (36.1 °C) (Axillary)   Resp 16   Ht 175.3 cm (69\")   Wt 88.1 kg (194 lb 3.2 oz)   SpO2 97%   BMI 28.68 kg/m²     PHYSICAL EXAM:  Physical Exam   Constitutional: He is oriented to person, place, and time. He appears well-developed and well-nourished. No distress.   HENT:   Head: Normocephalic and atraumatic.   Nose: Nose normal.   Mouth/Throat: No oropharyngeal exudate.   Dental fillings, dry MM   Eyes: Pupils are equal, round, and reactive to light. Conjunctivae and EOM are normal. No scleral icterus.   Eyeglasses   Neck: Normal range of motion. Neck supple.   Cardiovascular: Normal rate, regular rhythm and intact distal pulses.   No murmur heard.  Grade 2 systolic murmur, monitor leads   Pulmonary/Chest: Effort normal and breath sounds normal. No respiratory distress. He has no wheezes. He has no rales.   Abdominal: Soft. Bowel sounds are normal. He exhibits no distension and no mass. There is no tenderness. There is no guarding.   Genitourinary:   Genitourinary Comments: Deferred    Musculoskeletal: Normal range of motion. He exhibits no edema, tenderness or deformity.   LUE IV   Lymphadenopathy:     He has no cervical adenopathy.     He has no axillary adenopathy.        Right: No supraclavicular adenopathy present.        Left: No supraclavicular adenopathy present.   Neurological: He is alert and oriented to person, place, and time. Coordination normal.   Sleepy   Skin: Skin is " warm and dry. No rash noted. He is not diaphoretic. No erythema. No pallor.   Psychiatric: He has a normal mood and affect. His behavior is normal. Thought content normal.   Nursing note and vitals reviewed.      RECENT LABS:  Lab Results (last 24 hours)     Procedure Component Value Units Date/Time    Respiratory Panel, PCR - Lavage, Bronchus [[id number]]  (Normal) Collected:  05/04/20 0854    Specimen:  Lavage from Bronchus Updated:  05/04/20 1203     ADENOVIRUS, PCR Not Detected     Coronavirus 229E Not Detected     Coronavirus HKU1 Not Detected     Coronavirus NL63 Not Detected     Coronavirus OC43 Not Detected     Human Metapneumovirus Not Detected     Human Rhinovirus/Enterovirus Not Detected     Influenza B PCR Not Detected     Parainfluenza Virus 1 Not Detected     Parainfluenza Virus 2 Not Detected     Parainfluenza Virus 3 Not Detected     Parainfluenza Virus 4 Not Detected     Bordetella pertussis pcr Not Detected     Influenza A H1 2009 PCR Not Detected     Chlamydophila pneumoniae PCR Not Detected     Mycoplasma pneumo by PCR Not Detected     Influenza A PCR Not Detected     Influenza A H3 Not Detected     Influenza A H1 Not Detected     RSV, PCR Not Detected    Narrative:       The coronavirus on the RVP is NOT COVID-19 and is NOT indicative of infection with COVID-19.     Respiratory Culture - Lavage, Bronchus [[id number]] Collected:  05/04/20 0854    Specimen:  Lavage from Bronchus Updated:  05/04/20 1149     Gram Stain Few (2+) WBCs seen      No organisms seen    Non-gynecologic Cytology [[id number]] Collected:  05/04/20 0854    Specimen:  Lavage from Bronchus; Brushing from Lung, Right Lower Lobe Updated:  05/04/20 1100    Fine Needle Aspiration [[id number]] Collected:  05/04/20 0904    Specimen:  Fine Needle Aspirate from Lung, Right Lower Lobe; Fine Needle Aspirate from Lymph Node; Lung, Right Lower Lobe; Lymph Node Updated:  05/04/20 1032    Pneumocystis PCR - Wash, Bronchus [[id number]] Collected:   05/04/20 1023    Specimen:  Wash from Bronchus Updated:  05/04/20 1023    Tissue Pathology Exam [[id number]] Collected:  05/04/20 0912    Specimen:  Tissue from Lung, Right Lower Lobe Updated:  05/04/20 1021    Fungus Culture - Lavage, Bronchus [[id number]] Collected:  05/04/20 0854    Specimen:  Lavage from Bronchus Updated:  05/04/20 1021    AFB Culture - Lavage, Bronchus [[id number]] Collected:  05/04/20 0854    Specimen:  Lavage from Bronchus Updated:  05/04/20 1021    Cytomegalovirus (CMV) By PCR - Lavage, Bronchus [[id number]] Collected:  05/04/20 0854    Specimen:  Lavage from Bronchus Updated:  05/04/20 1020    POC Glucose Once [[id number]]  (Abnormal) Collected:  05/04/20 0948    Specimen:  Blood Updated:  05/04/20 0949     Glucose 286 mg/dL      Comment: Serial Number: [id number]:  [id number]       POC Glucose Once [[id number]]  (Abnormal) Collected:  05/04/20 0749    Specimen:  Blood Updated:  05/04/20 0756     Glucose 318 mg/dL      Comment: Serial Number: [id number]:  [id number]       Protime-INR [[id number]]  (Abnormal) Collected:  05/04/20 0409    Specimen:  Blood Updated:  05/04/20 0503     Protime 12.1 Seconds      INR 1.19    POC Glucose Once [[id number]]  (Abnormal) Collected:  05/03/20 2103    Specimen:  Blood Updated:  05/03/20 2349     Glucose 305 mg/dL      Comment: Serial Number: [id number]:  [id number]       POC Glucose Once [[id number]]  (Abnormal) Collected:  05/03/20 1650    Specimen:  Blood Updated:  05/03/20 1653     Glucose 292 mg/dL      Comment: Serial Number: [id number]:  [id number]             PENDING RESULTS:  stool Hemoccult, bronchoscopy pathology    IMAGING REVIEWED:  Xr Chest 1 View    Result Date: 5/4/2020   1. Worsening consolidation in the right lung presumably representing known malignancy versus pneumonia versus hemorrhage. 2. No pneumothorax or pneumomediastinum. 3. Osseous metastatic disease best appreciated in left glenoid. There are additional lesions  which were seen on the previous CT of the chest.  Electronically Signed By-Kedar Cruz On:5/4/2020 10:21 AM This report was finalized on [id number] by  Kedar Cruz, .      I have reviewed the patient's labs, imaging, reports, and other clinician documentation.    Assessment/Plan   ASSESSMENT:  1. Right lower lobe lung mass -  Along with other metastatic disease as well as lytic bone lesions, most likely primary lung malignancy with metastasis.  Bronchoscopy with transbronchial biopsy suggestive of adenocarcinoma.   Has extensive bony metastatic disease of the spine and ribs.  On dexamethasone twice daily per neurosurgery for spinal mets with work-up in progress-discussed with Dr. Angel.  Possible brain met on MRI without edema or hemorrhage and patient asymptomatic.    2. Hypercalcemia -  Ionized calcium high and patient s/p Zometa infusion given 4/29.  Calcium level has normalized.  3. High d-dimer -  Likely from malignancy.  If patient develops any leg pain or new swelling, will check venous Doppler.  On prophylactic Lovenox.  4. Anemia -    Iron sat low. Stool Hemoccult pending and iron replacement initiated.  5. HTNCAD/PVD -Plavix and aspirin held.  Prophylactic Lovenox initiated pending biopsies.  6. DM -  Per primary care.    PLAN:  1. Follow hemoglobin and calcium levels.  2. Await pathology for treatment planning.  Likely primary lung cancer with bone and possible brain mets and will plan molecular studies to determine systemic treatment options.  3. Radiation oncology consultation.  4. Neurosurgery follow-up.      Proper PPE worn given coronavirus pandemic.     I have personally performed a face-to-face diagnostic evaluation via telehealth on this patient.  I have discussed the case with Isidro Perdue NP, have edited/reviewed the note,  and agree with the care plan.  The patient is currently sleepy post bronchoscopy but denies any significant problems.  On examination he does have mucous membranes and  biopsies from the right lower lung and lymph node are suggestive of adenocarcinoma.  Have discussed case with Dr. Angel.  Will wait for radiation oncology opinion and ask for molecular testing on pathology.    I discussed the patients findings and my recommendations with patient and consulting provider.    Fili Cordova MD  05/04/20  12:06

## 2020-05-04 NOTE — PLAN OF CARE
The MRI of the neural axis was reviewed as well as the report.  There was report of a left subfrontal contrast-enhancing lesion.  I really cannot appreciate it today and to me it appears more artifact than actual lesion but very well could be consistent with metastatic disease.  My largest concern is the amount of destruction in the right facets at the C6-7 as well as the left-sided facet at C5-6.  There is bone disease at these levels as well.  I cannot appreciate any overt cord compression throughout the neural axis.  There are prominent bony destructions also seen in the lumbar spine.  At this time these are asymptomatic and 49 amenable to cement augmentation.  My large concern is for the possibility of further destruction of the cervical elements with progression of instability or to provide a compression with resulting cord compression.  He underwent bronchoscopy today and at this time the family would be to await the pathology.  My underlying impression is that his prognosis is very poor and if palliative efforts are all that can be entertained if his life expectancy is less than 3 months and I would not recommend any surgical intervention.  If we are talking 3 to 6 months or longer on how he would intervene and I feel that radiation and surgical stabilization of the spine will be warranted.  As far as the lumbar spine we will need to keep close observation on the lesions in the lumbar spine and possibly there are 2-3 areas that are high risk now for fracture unfortunate cannot hold cement but I would like to avoid any larger procedure at this time.  Will await the pathology and hopefully arrive to a consensus agreement after review with oncology and radiation oncology.

## 2020-05-04 NOTE — ANESTHESIA PROCEDURE NOTES
Airway  Urgency: elective    Date/Time: 5/4/2020 8:29 AM  Airway not difficult    General Information and Staff    Patient location during procedure: OR    Indications and Patient Condition  Indications for airway management: airway protection    Preoxygenated: yes  MILS maintained throughout  Mask difficulty assessment: 1 - vent by mask    Final Airway Details  Final airway type: endotracheal airway      Successful airway: ETT  Cuffed: yes   Successful intubation technique: direct laryngoscopy  Facilitating devices/methods: intubating stylet  Endotracheal tube insertion site: oral  Blade: Ramy  Blade size: 3  ETT size (mm): 8.5  Cormack-Lehane Classification: grade I - full view of glottis  Placement verified by: chest auscultation and capnometry   Measured from: lips  ETT/EBT  to lips (cm): 23  Number of attempts at approach: 1  Assessment: lips, teeth, and gum same as pre-op and atraumatic intubation

## 2020-05-04 NOTE — CONSULTS
Inpatient Radiation Oncology Consultation  DOS: 05/04/2020      Full note dictation pending.  Reviewed pre-hdz path and discussed imaging with patient and Dr. Angel.  Prognosis difficult with Adenocarcinoma as need Foundation One testing/PDL-1 testing.  Patient is otherwise of high KPS and low symptom burden.  Dr. Angel to discuss with Dr. Cordova but appears leaning towards need for stabilization surgery.    Outpatient testing would been needed for Foundation one?   If no surgery would recommend palliative radiation therapy.  Discussed at length with patient today.    Cc: Bilateral shoulder pain and to lesser extent back pain    HPI: Mr. Salinas is a 73 yo male who was admitted to [facility] on 04/28/2020 through the ED after about a one month episode of bilateral shoulder pain that progressed to the point where he couldn't sleep at night.  He noted the pain was worse on the left side and over the last month he developed gradual global weakness/fatigue.  He also notes a dry cough without fever since January.  He had a course of steroids but no relief of the shoulder pain.  He also has lost 20 lbs unintentional in a period just greater than 4 weeks.  He also notes increased SOB.  He denies Headaches, n/v or other issues.    Inpatient imaging:  CT chest reveals dense masslike consolidation in the apical segment of the right lower bowel with surrounding opacities.  Nodular opacities in both lungs were present.  Diffuse lytic osseous metastatic disease was present with large destructive destructive lesion in the right C 7 posterior elements with large lytic lesion of the T8, T12 and L1 vertebral bodies.  Possible metastatic soft tissue nodule in the lateral left chest wall/axilla was present.    · CT abdomen and pelvis showed 2 small liver lesions measuring up to 14 mm, indeterminate for cyst versus metastatic disease.  CT cervical, thoracic, and lumbar spine showed extensive lytic lesions.  There was a pathologic fracture at  C5 with involvement of the left transverse process and transverse foramen.  There was a lytic lesion at C6-7. There were areas of cortical breakthrough and nondisplaced pathologic fracture in the lumbar spine.  No evidence of retropulsion or soft tissue tumoral extension into the osseous spinal canal.  CT head was negative for metastatic disease.  · MRI brain showed a non-specific, aytpical linear area, 7 mm subcortical left frontal lobe lesion that could be possibe metastatic disease with differential also including subacute infarct.  No hemorrhage or edema noted around this lesion and patient asymptomatic.  Plan to follow with repeat imaging as outpatient. Neurosurgery is following.  · MRI C and T-spine showed extensive osseous metastatic disease most pronounced on the right at C6-7.  There is no abnormal enhancement of epidural space or cervical spinal cord and no significant central canal narrowing.  There is limited evaluation of the neural foramina with suspected narrowing at sites of metastatic destruction including C4-C5, right C5-C6, and right C6-C7.  The largest lesion in the T-spine was at T5 with mild expansion which had peripheral enhancement and measured 3.6 x 2.3 x 2.4 cm.  There is no significant central canal narrowing or neural foraminal narrowing.  There were enhancing lesions involving many of the ribs.  · Again the CT lumbar spine on 04/30/2020 multiple lytic lesions throughout the lumbar spine with areas of cortical breakthrough and nondisplaced pathologic fracture. No evidence of retropulsion or soft tissue tumoral extension into the osseous spinal canal.      · 5/4/2020 navigational bronchoscopy by Dr. Velasquez: FNA from right lower lobe mass positive for non-small cell lung cancer possible adeno. FNA from R12 lobar lymph node suspicious for malignancy.    Patient and I talked through the imaging findings and the preliminary pathology results today.  Patient saw Dr. Angel on 05/01/2020 and   Stanley is aware of the pathology and reviewing the complex C spine imaging with his colleagues.    Past Medical History:  Medical History        Past Medical History:   Diagnosis Date   • BPH (benign prostatic hyperplasia)     • CAD (coronary artery disease)     • DM type 2 (diabetes mellitus, type 2) (CMS/Formerly [facility]) 2001   • HLD (hyperlipidemia)     • HTN (hypertension) 2003   • Myocardial infarction (CMS/Formerly [facility])     • Peripheral neuropathy     • Vitamin D deficiency 12/2010         Past Surgical History:  Surgical History         Past Surgical History:   Procedure Laterality Date   • CHOLECYSTECTOMY       • CORONARY ANGIOPLASTY WITH STENT PLACEMENT   09/09/2013     LAD   • CORONARY ARTERY BYPASS GRAFT   07/2001   • CORONARY STENT PLACEMENT   06/18/2013   • FEMORAL ARTERY STENT   08/2019         Home Meds:  Prescriptions Prior to Admission           Medications Prior to Admission   Medication Sig Dispense Refill Last Dose   • amLODIPine (NORVASC) 10 MG tablet Take 10 mg by mouth Daily.   3 4/27/2020 at Unknown time   • aspirin (ADULT ASPIRIN EC LOW STRENGTH) 81 MG EC tablet Take 81 mg by mouth 2 (Two) Times a Day.     4/27/2020 at Unknown time   • atenolol (TENORMIN) 25 MG tablet Take 25 mg by mouth 2 (Two) Times a Day.     4/27/2020 at Unknown time   • Cholecalciferol ([employer] VITAMIN D3) 1000 units chewable tablet Chew 1 tablet Daily.     4/27/2020 at Unknown time   • clopidogrel (PLAVIX) 75 MG tablet Take 75 mg by mouth Daily.     4/27/2020 at Unknown time   • finasteride (PROSCAR) 5 MG tablet Take 5 mg by mouth Every Night.     4/27/2020 at Unknown time   • glimepiride (AMARYL) 2 MG tablet Take 4 mg by mouth 2 (Two) Times a Day.     4/27/2020 at Unknown time   • isosorbide mononitrate (IMDUR) 60 MG 24 hr tablet Take 60 mg by mouth Daily.     4/27/2020 at Unknown time   • losartan (COZAAR) 100 MG tablet Take 100 mg by mouth Daily.     4/27/2020 at Unknown time   • metFORMIN ER (GLUCOPHAGE-XR) 500 MG 24 hr tablet Take 2,000  mg by mouth Daily Before Supper.     4/27/2020 at Unknown time   • niacin (NIASPAN) 500 MG CR tablet Take 1 tablet by mouth Daily.     4/27/2020 at Unknown time   • nitroglycerin (NITROSTAT) 0.4 MG SL tablet DISSOLVE ONE TABLET UNDER TONGUE AS NEEDED FOR CHEST PAIN (Patient taking differently: Place 0.4 mg under the tongue Every 5 (Five) Minutes As Needed.) 25 tablet 0 Taking   • Omega-3 Fatty Acids ([employer] FISH OIL) 1200 MG capsule Take 1 capsule by mouth 2 (Two) Times a Day.     4/27/2020 at Unknown time   • raNITIdine (ZANTAC) 75 MG tablet Take 75 mg by mouth Daily.     4/27/2020 at Unknown time   • rosuvastatin (CRESTOR) 10 MG tablet Take 1 tablet by mouth Daily.     4/27/2020 at Unknown time            Allergies:  No Known Allergies    Social History:  for 50 years this 2/14/2020; wife and he live alone in [place], 2 children, 1 alive and well; 4 grandchildren.  Quit smoking > 30 years ago.  Retired   Social History   Social History            Socioeconomic History   • Marital status:        Spouse name: Not on file   • Number of children: Not on file   • Years of education: Not on file   • Highest education level: Not on file   Tobacco Use   • Smoking status: Former Smoker   • Smokeless tobacco: Never Used   Substance and Sexual Activity   • Alcohol use: No   • Drug use: No   • Sexual activity: Defer            Family History:        Family History   Problem Relation Age of Onset   • Heart disease Father     • Heart attack Father 62   • Heart attack Paternal Grandmother 72   • Heart disease Paternal Grandmother       ROS:  Review of Systems -Sleepy post procedural but states he feels well.  Constitutional: Negative for activity change, chills, fatigue, fever and unexpected weight change.   HENT: Negative for congestion, dental problem, hearing loss, mouth sores, nosebleeds, sore throat and trouble swallowing.         Dry mouth   Eyes: Negative for photophobia and visual disturbance.  "  Respiratory: Negative for cough, chest tightness and shortness of breath.    Cardiovascular: Negative for chest pain, palpitations and leg swelling.   Gastrointestinal: Negative for abdominal distention, abdominal pain, blood in stool, constipation, diarrhea, nausea and vomiting.   Endocrine: Negative for cold intolerance and heat intolerance.   Genitourinary: Negative for decreased urine volume, difficulty urinating, frequency, hematuria and urgency.   Musculoskeletal: Positive for neck pain. Negative for arthralgias, back pain and gait problem.   Skin: Negative for rash and wound.   Neurological: Negative for dizziness, tremors, weakness, light-headedness, numbness and headaches.   Hematological: Negative for adenopathy. Does not bruise/bleed easily.   Psychiatric/Behavioral: Negative for confusion and hallucinations. The patient is not nervous/anxious.    All other systems reviewed and are negative.    VITALS:   /46 (BP Location: Right arm, Patient Position: Lying)   Pulse 66   Temp 96.9 °F (36.1 °C) (Axillary)   Resp 16   Ht 175.3 cm (69\")   Wt 88.1 kg (194 lb 3.2 oz)   SpO2 97%   BMI 28.68 kg/m²      PHYSICAL EXAM:  Physical Exam   Constitutional: He is oriented to person, place, and time. He appears well-developed and well-nourished. No distress.   HENT:   Head: Normocephalic and atraumatic.   Nose: Nose normal.   Mouth/Throat: No oropharyngeal exudate.   Eyes: Pupils are equal, round, and reactive to light. Conjunctivae and EOM are normal. No scleral icterus.   Neck: Normal range of motion. Neck supple.   Cardiovascular: Normal rate, regular rhythm and intact distal pulses.   Pulmonary/Chest: Effort normal and breath sounds normal. No respiratory distress. He has no wheezes. He has no rales.   Abdominal: Soft. Bowel sounds are normal. He exhibits no distension and no mass. There is no tenderness. There is no guarding.    Musculoskeletal: Normal range of motion. He exhibits no edema, tenderness " or deformity.  No focal pain on exam of spine.  Lymphadenopathy:     He has no cervical adenopathy.     He has no axillary adenopathy.        Right: No supraclavicular adenopathy present.        Left: No supraclavicular adenopathy present.   Neurological: He is alert and oriented to person, place, and time. Coordination normal.   Skin: Skin is warm and dry. No rash noted. He is not diaphoretic. No erythema. No pallor.   Psychiatric: He has a normal mood and affect. His behavior is normal. Thought content normal.             RECENT LABS:           Lab Results (last 24 hours)      Procedure Component Value Units Date/Time     Respiratory Panel, PCR - Lavage, Bronchus [[id number]]  (Normal) Collected:  05/04/20 0854     Specimen:  Lavage from Bronchus Updated:  05/04/20 1203       ADENOVIRUS, PCR Not Detected       Coronavirus 229E Not Detected       Coronavirus HKU1 Not Detected       Coronavirus NL63 Not Detected       Coronavirus OC43 Not Detected       Human Metapneumovirus Not Detected       Human Rhinovirus/Enterovirus Not Detected       Influenza B PCR Not Detected       Parainfluenza Virus 1 Not Detected       Parainfluenza Virus 2 Not Detected       Parainfluenza Virus 3 Not Detected       Parainfluenza Virus 4 Not Detected       Bordetella pertussis pcr Not Detected       Influenza A H1 2009 PCR Not Detected       Chlamydophila pneumoniae PCR Not Detected       Mycoplasma pneumo by PCR Not Detected       Influenza A PCR Not Detected       Influenza A H3 Not Detected       Influenza A H1 Not Detected       RSV, PCR Not Detected     Narrative:        The coronavirus on the RVP is NOT COVID-19 and is NOT indicative of infection with COVID-19.      Respiratory Culture - Lavage, Bronchus [[id number]] Collected:  05/04/20 0854     Specimen:  Lavage from Bronchus Updated:  05/04/20 1149       Gram Stain Few (2+) WBCs seen         No organisms seen     Non-gynecologic Cytology [[id number]] Collected:  05/04/20 0854      Specimen:  Lavage from Bronchus; Brushing from Lung, Right Lower Lobe Updated:  05/04/20 1100     Fine Needle Aspiration [[id number]] Collected:  05/04/20 0904     Specimen:  Fine Needle Aspirate from Lung, Right Lower Lobe; Fine Needle Aspirate from Lymph Node; Lung, Right Lower Lobe; Lymph Node Updated:  05/04/20 1032     Pneumocystis PCR - Wash, Bronchus [[id number]] Collected:  05/04/20 1023     Specimen:  Wash from Bronchus Updated:  05/04/20 1023     Tissue Pathology Exam [[id number]] Collected:  05/04/20 0912     Specimen:  Tissue from Lung, Right Lower Lobe Updated:  05/04/20 1021     Fungus Culture - Lavage, Bronchus [[id number]] Collected:  05/04/20 0854     Specimen:  Lavage from Bronchus Updated:  05/04/20 1021     AFB Culture - Lavage, Bronchus [[id number]] Collected:  05/04/20 0854     Specimen:  Lavage from Bronchus Updated:  05/04/20 1021     Cytomegalovirus (CMV) By PCR - Lavage, Bronchus [[id number]] Collected:  05/04/20 0854     Specimen:  Lavage from Bronchus Updated:  05/04/20 1020     POC Glucose Once [[id number]]  (Abnormal) Collected:  05/04/20 0948     Specimen:  Blood Updated:  05/04/20 0949       Glucose 286 mg/dL         Comment: Serial Number: [id number]:  [id number]        POC Glucose Once [[id number]]  (Abnormal) Collected:  05/04/20 0749     Specimen:  Blood Updated:  05/04/20 0756       Glucose 318 mg/dL         Comment: Serial Number: [id number]:  [id number]        Protime-INR [[id number]]  (Abnormal) Collected:  05/04/20 0409     Specimen:  Blood Updated:  05/04/20 0503       Protime 12.1 Seconds         INR 1.19     POC Glucose Once [[id number]]  (Abnormal) Collected:  05/03/20 2103     Specimen:  Blood Updated:  05/03/20 2349       Glucose 305 mg/dL         Comment: Serial Number: [id number]:  [id number]        POC Glucose Once [[id number]]  (Abnormal) Collected:  05/03/20 1650     Specimen:  Blood Updated:  05/03/20 1653       Glucose 292 mg/dL         Comment: Serial  Number: [id number]:  [id number]              A/P:  Apparent Stage IV NSCLC, prelim path favors Adenocarcinoma.  -Significant spinal mets, discussed in detail today with patient and then with Dr. Angel in the unit crum after meeting the patient.  Dr. Angel is worried about the stability of the Cervical spine disease and is reviewing with his colleagues need for procedure.  He is going to reach out to Dr. Cordova and I after this evaluation.  He is aware that NGS testing (I.e., Foundation One) will be performed to determine targeted therapy options.    -HE has him on steroids and he is doing ok with minimal pain and no focal exam findings.  -We discussed the area in the left frontal that is not typical of metastatic disease per our opinion.  It should be followed with 1mm SRS protocol MRI brain in 2-3 weeks.  - I would recommend palliative radiation therapy now if Dr. Angel is not going to recommend stabilization surgery.  -Molecular studies per Dr. Cordova.    Will reach out to Dr. Cordova in the AM and await Dr. Angel recommendations and his potential discussions with Mr. Salinas and his family.    I have independently reviewed the MRI findings and discussed with patient and Dr. Angel as noted today.      Total time spent with the patient and the floor was 60 minutes, with significant coordination of care.  We discussed the extensive burden of disease but difficulty giving prognosis without seeing the Next Generation Sequencing/molecular studies.  The patient was of high performance status and low symptom burden prior to admission.      Approved by:     Hebert Curtis MD  05/04/20  20:05

## 2020-05-04 NOTE — PROGRESS NOTES
Daily Progress Note        Malignant neoplasm of right lung (CMS/HCC)      Assessment:    Lung mass bronchoalveolar markings in the superior segment of the right lower lobe differential diagnosis is cancer+/- pneumonia  No PE  COVID-19 test is negative  Viral panel is negative  Possible bone metastasis    Recommendations:     Navigation bronchoscopy on 5/4/2020   Procedure:  1. Navigation bronchoscopy  2. EBUS bronchoscopy  3. Transbronchial biopsies x 3 through navigation bronchoscopy and direct visualization with fluoroscopy  4. Utilization of radial probe ultrasonography confirm mass location  5. Fine-needle aspiration x 3 right lower lobe through navigation bronchoscopy with fluoroscopy  6. Brushing right lower lobe through navigation bronchoscopy and fluoroscopy  7. BAL right lower lobe  8. EBUS bronchoscopy with fine-needle aspiration x2 right lobar R12 lymph node        Post-Operative Diagnosis:     FNA from right lower lobe mass positive for non-small cell lung cancer possible adeno  FNA from R12 lobar lymph node suspicious for malignancy please note that this is a very small lymph node 7 to 8 mm      Antibiotics  Unasyn             LOS: 4 days     Subjective     Objective     Vital signs for last 24 hours:  Vitals:    05/03/20 1101 05/03/20 1600 05/03/20 2046 05/04/20 0431   BP: 115/58  145/68 163/67   BP Location: Right arm  Left arm Right arm   Patient Position: Lying  Lying Lying   Pulse: (!) 48 67 66 (!) 45   Resp: 14  18 16   Temp: 97.5 °F (36.4 °C)  97.5 °F (36.4 °C) 96.9 °F (36.1 °C)   TempSrc: Oral  Oral Axillary   SpO2: 97%  98% 93%   Weight:    88.1 kg (194 lb 3.2 oz)   Height:           Intake/Output last 3 shifts:  I/O last 3 completed shifts:  In: 700 [P.O.:600; IV Piggyback:100]  Out: 2700 [Urine:2700]  Intake/Output this shift:  I/O this shift:  In: 800 [I.V.:800]  Out: -       Radiology  Imaging Results (Last 24 Hours)     Procedure Component Value Units Date/Time    FL < 1 Hour [[id number]]  Resulted:  05/04/20 0931     Updated:  05/04/20 0932          Labs:  Results from last 7 days   Lab Units 05/03/20  0425   WBC 10*3/mm3 10.10   HEMOGLOBIN g/dL 10.9*   HEMATOCRIT % 30.3*   PLATELETS 10*3/mm3 217     Results from last 7 days   Lab Units 05/03/20  0425   SODIUM mmol/L 129*   POTASSIUM mmol/L 4.2   CHLORIDE mmol/L 93*   CO2 mmol/L 23.0   BUN mg/dL 28*   CREATININE mg/dL 1.15   CALCIUM mg/dL 8.6   BILIRUBIN mg/dL 0.4   ALK PHOS U/L 141*   ALT (SGPT) U/L 15   AST (SGOT) U/L 17   GLUCOSE mg/dL 350*         Results from last 7 days   Lab Units 05/03/20  0425 05/01/20  0604 04/28/20  1923   ALBUMIN g/dL 3.50 3.50 4.10     Results from last 7 days   Lab Units 04/28/20  1923   TROPONIN T ng/mL <0.010             Results from last 7 days   Lab Units 05/04/20  0409   INR  1.19*               Meds:   SCHEDULE    ALPRAZolam 1 mg Oral Nightly   amLODIPine 10 mg Oral Daily   ampicillin-sulbactam 3 g Intravenous Q6H   atenolol 25 mg Oral BID   dexamethasone 4 mg Oral Q12H   enoxaparin 40 mg Subcutaneous Q24H   EPINEPHrine      famotidine 20 mg Oral Daily   ferrous sulfate 324 mg Oral BID With Meals   finasteride 5 mg Oral Nightly   glipizide 10 mg Oral QAM AC   insulin lispro 0-14 Units Subcutaneous TID AC   insulin lispro 10 Units Subcutaneous TID With Meals   insulin regular 4 Units Subcutaneous BID   isosorbide mononitrate 60 mg Oral Daily   lidocaine      losartan 100 mg Oral Daily   rosuvastatin 10 mg Oral Daily   sertraline 25 mg Oral Daily   sodium chloride 10 mL Intravenous Q12H   sodium chloride 10 mL Intravenous Q12H     Infusions    niCARdipine 5-15 mg/hr    phenylephrine 0.5-3 mcg/kg/min    sodium chloride 9 mL/hr Last Rate: 9 mL/hr (05/04/20 0805)     PRNs  •  acetaminophen **OR** acetaminophen  •  ALPRAZolam  •  calcium carbonate  •  dextrose  •  dextrose  •  dextrose  •  diphenhydrAMINE  •  diphenhydrAMINE  •  ePHEDrine  •  fentanyl  •  glucagon (human recombinant)  •  hydrALAZINE  •   HYDROcodone-acetaminophen  •  HYDROcodone-acetaminophen  •  HYDROcodone-acetaminophen  •  insulin lispro **AND** insulin lispro  •  ipratropium-albuterol  •  labetalol  •  lidocaine (cardiac)  •  magnesium hydroxide  •  meperidine  •  metoprolol tartrate  •  morphine  •  naloxone  •  nitroglycerin  •  nitroglycerin  •  nitroglycerin  •  ondansetron  •  ondansetron  •  oxyCODONE  •  promethazine **OR** promethazine **OR** promethazine **OR** promethazine  •  promethazine **OR** promethazine **OR** [DISCONTINUED] promethazine **OR** [DISCONTINUED] promethazine  •  sennosides-docusate  •  [COMPLETED] Insert peripheral IV **AND** sodium chloride  •  sodium chloride  •  sodium chloride  •  sodium chloride  •  zolpidem    Physical Exam:  Physical Exam   Constitutional: He is oriented to person, place, and time.   Cardiovascular:   Murmur heard.  Pulmonary/Chest: No respiratory distress. He has wheezes. He exhibits no tenderness.   Abdominal: He exhibits no distension. There is no tenderness.   Neurological: He is alert and oriented to person, place, and time.   Skin: Skin is warm and dry.       ROS  Review of Systems   HENT: Negative for rhinorrhea and sneezing.    Respiratory: Positive for cough. Negative for apnea, choking, chest tightness, wheezing and stridor.    Cardiovascular: Negative for leg swelling.             Total time spent with patient greater than: 1 Hour

## 2020-05-04 NOTE — PLAN OF CARE
Patient was in Endo for planned bronchoscopy this a.m.    Will consult with  after confirmation of tissue biopsy results for possible treatment options.

## 2020-05-04 NOTE — PROGRESS NOTES
Continued Stay Note  NASEEM Shaw     Patient Name: Clemente Salinas  MRN: [id number]  Today's Date: 5/4/2020    Admit Date: 4/28/2020    Discharge Plan     Row Name 05/04/20 1501       Plan    Plan  Anticipate routine home with family    Plan Comments  DC barriers: bronch today, pending neurosurgery and oncology recommendations         Abeba Cruz RN

## 2020-05-04 NOTE — ANESTHESIA PREPROCEDURE EVALUATION
Anesthesia Evaluation     Patient summary reviewed and Nursing notes reviewed   NPO Solid Status: > 8 hours  NPO Liquid Status: > 8 hours           Airway   Mallampati: I  TM distance: >3 FB  Neck ROM: full  No difficulty expected  Dental - normal exam     Pulmonary - normal exam   (+) lung cancer,   Cardiovascular - normal exam    (+) hypertension, past MI , CAD, CABG, hyperlipidemia,       Neuro/Psych  (+) numbness,     GI/Hepatic/Renal/Endo    (+)   diabetes mellitus,     Musculoskeletal (-) negative ROS    Abdominal  - normal exam    Bowel sounds: normal.   Substance History - negative use     OB/GYN negative ob/gyn ROS         Other                        Anesthesia Plan    ASA 3     general     intravenous induction     Anesthetic plan, all risks, benefits, and alternatives have been provided, discussed and informed consent has been obtained with: patient.

## 2020-05-04 NOTE — PLAN OF CARE
Problem: Patient Care Overview  Goal: Plan of Care Review  Flowsheets (Taken 5/4/2020 1788)  Progress: improving  Plan of Care Reviewed With: patient  Outcome Summary: Patient has complained of slight pain in his shoulder blades. No other complaints. Had a broncoscopy this morning. Radiation oncologist came to see patient. Going to discuss with consulting physicians but he may take him tomorrow to get fitted for radiation mask. Will continue to monitor.     Problem: Patient Care Overview  Goal: Interprofessional Rounds/Family Conf  Flowsheets (Taken 5/4/2020 6504)  Summary: Patient is from home with wife and plans to return home after discharge.  Participants: ; nursing

## 2020-05-04 NOTE — OP NOTE
Bronchoscopy Procedure Note    Procedure:  1. Navigation bronchoscopy  2. EBUS bronchoscopy  3. Transbronchial biopsies x 3 through navigation bronchoscopy and direct visualization with fluoroscopy  4. Utilization of radial probe ultrasonography confirm mass location  5. Fine-needle aspiration x 3 right lower lobe through navigation bronchoscopy with fluoroscopy  6. Brushing right lower lobe through navigation bronchoscopy and fluoroscopy  7. BAL right lower lobe  8. EBUS bronchoscopy with fine-needle aspiration x2 right lobar R12 lymph node    Pre-Operative Diagnosis: Lung mass      Post-Operative Diagnosis:   FNA from right lower lobe mass positive for non-small cell lung cancer possible adeno  FNA from our 12 lobar lymph node suspicious for malignancy    Anesthesia: Moderate Sedation    Procedure Details: Patient was consented for the procedure with all risk and benefit of the procedure explained in detail.  Patient was given the opportunity to ask questions and all concerns were answered.  The bronchocope was inserted into the main airway via the endotracheal tube. An anatomical survey was done of the main airways and the subsegmental bronchus to at least the first subsegmental level of all five lobes of both lungs.  The findings are reported below.  A bronchoalveolar lavage was performed using aliquots of normal saline instilled into the airways then aspirated back.    Findings:  Bronchoscope passed into oral cavity to the level of the vocal cords.  Lidocaine used for local anesthetic over vocal cords.  Bronchoscope was passed between the vocal cords into the trachea.  All airways were visualized to at least the first subsegment level of all 5 lobes of both lungs.  Airways were of normal size and caliber.  No endobronchial lesions seen.      Navigation bronchoscopy and EBUS  Consent obtained and verified, procedure explained in details to patient and family members  Time-out was done    Navigational planning  was done utilizing images obtained on CT scan   After confirming good planning  Patient was intubated orally with size 8.5 ET tube by anesthesiology    Started the procedure with Flexible bronchoscope was introduced into the endotracheal tube  Mishel was visualized  Bronchial tree was inspected thoroughly  No endobronchial lesions were noted  Mild mucoid secretions which was suctioned bilaterally    Then we advanced the locatable guide ° of the navigational bronchoscope into the working channel  Airway mapping was done thoroughly    Navigational bronchoscopy was carried with adequate identification of the mass target  Location was confirmed by fluoroscopy  Location was confirmed with the radial probe ultrasound  After locking the locatable guide LG in place with confirmation with fluoroscopy    Sampling started withsize 18 needle 1st pass was positive for non small cell carcinoma pathology on site confirmed  Two more needle passes were done under direct fluoroscopy    Transbronchial biopsies through the LG directed at the center of the mass under fluoroscopy was done  Total of 3 biopsies    Brush was used to under direct fluoroscopy through the LG    10 cc BAL was done through the LG    After confirmation with fluoroscopy that there is no pneumothorax    We switched to EBUS bronchoscopy  We located the right lobar node 12R under direct ultrasound guidance 2 passes were done utilizing size 21 gauge needle  No subcarinal or mediastinal nodes identified with EBUS  No bleeding was noted    Patient tolerated procedure well        Estimated Blood Loss:  Minimal           Specimens:  Sent serosanguinous fluid                Complications:  None; patient tolerated the procedure well.           Disposition: PACU - hemodynamically stable.      Patient tolerated the procedure well.    Jeb Velasquez MD  5/4/2020  09:44

## 2020-05-05 PROBLEM — C79.9 METASTATIC CANCER (HCC): Status: ACTIVE | Noted: 2020-01-01

## 2020-05-05 NOTE — PROGRESS NOTES
LOS: 5 days   Patient Care Team:  Sola Guerrero MD as PCP - General  Jeremi Blandon MD as PCP - Claims Attributed  Pj Hough MD as Consulting Physician (Cardiology)  Fili Cordova MD as Consulting Physician (Hematology and Oncology)    Chief Complaint:  Neck and left scapular pain    Subjective     Interval History:   NO events in PM. Pt states pain is better.  Still having pain in left scapula and neck aggravated when he turns his head.        Objective     Vital Signs  Temp:  [97.4 °F (36.3 °C)-97.5 °F (36.4 °C)] 97.4 °F (36.3 °C)  Heart Rate:  [52-76] 53  Resp:  [14-20] 16  BP: (129-169)/(46-86) 161/72    Physical Exam:   General Appearance alert, appears stated age and cooperative  Head normocephalic, without obvious abnormality and atraumatic  Back no kyphosis present and no scoliosis present  Extremities moves extremities well, no edema, no cyanosis and no redness  Pulses Pulses palpable and equal bilaterally     Results Review:     I reviewed the patient's new clinical results.  Preliminary pathology adenocarcinoma  Medication Review: reviewed    Assessment/Plan       Malignant neoplasm of right lung (CMS/HCC)      Mr. Salinas suffers from mechanical pain of the cervical spine which I feel his symptomatic level is the facet at the C6-T1.  There is greater than 50% loss of the vertebra at C5 with destruction of the right facet and there is complete destruction of the facet and pedicle at the left C7 including the C6-T1 facet.  The thoracic lesions are small except the T12 with 60% loss and L1 with about the same running the length of the bone.  L2 demonstrates two audie with one in the right pedicle and only 30% as well as small lesions at L4, L5 and sacrum.  My concern is the stability of the cervical spine and high risk for fracture as well as the T12 and L1.  Pathology consistent with adenocarcinoma.  Molecular testing will be pending but concensus discussion with Benjamin Curtis and Tasha  is prognosis most likely at least 8-12 months.  He is at high risk for fracture and neurologic compromise.  He has disease which appears limited to lung and spine and I do not feel he has an intracranial met.  I have recommended surgical stabilization via C5 vertebrectomy and C3-T3 stabilization and percutaneous vertebroplasty at T12 and L1.  Possible need for further lumbar regions but I feel L2 is stable and only has 30% involvement.  We will proceed with surgery and cement then staged radiation and adjuvant chemotherapy.  I did discuss this at length with the patient and he does wish to have this done. We did discuss the risk of the surgery being bleeding, death, paralysis, infection, CSF leak, injury to the carotids or vertebral arteries resulting in stroke, injury to the recurrent laryngeal nerve causing temporary or permanent hoarseness and injury to the trachea or esophagus.  We did discuss the potential risk and need for further surgeries.  The patient indicates they understand and wish to proceed.  We will tentatively plan for surgery this Thursday.      Pawel Angel MD  05/05/20  09:04

## 2020-05-05 NOTE — PROGRESS NOTES
Daily Progress Note        Malignant neoplasm of right lung (CMS/HCC)      Assessment:    Lung mass bronchoalveolar markings in the superior segment of the right lower lobe FNA from right lower lobe mass positive for non-small cell lung cancer possible adeno    FNA from R12 lobar lymph node suspicious for malignancy please note that this is a very small lymph node 7 to 8 mm    No PE  COVID-19 test is negative  Viral panel is negative  Possible bone metastasis    Navigation bronchoscopy on 5/4/2020   1. Transbronchial biopsies x 3 through navigation bronchoscopy and direct visualization with fluoroscopy  2. Utilization of radial probe ultrasonography confirm mass location  3. Fine-needle aspiration x 3 right lower lobe through navigation bronchoscopy with fluoroscopy  4. Brushing right lower lobe through navigation bronchoscopy and fluoroscopy  5. BAL right lower lobe  6. EBUS bronchoscopy with fine-needle aspiration x2 right lobar R12 lymph node      Recommendations:    Await final pathology and markers  Possible spine surgery  Needs PFT,s  Antibiotics  Unasyn             LOS: 5 days     Subjective     Objective     Vital signs for last 24 hours:  Vitals:    05/04/20 1042 05/04/20 1207 05/04/20 2131 05/05/20 0325   BP: 129/46 156/64 133/67 161/72   BP Location: Right arm Right arm Right arm Right arm   Patient Position: Lying Lying Lying Lying   Pulse: 66 52 52 53   Resp: 16 16 16 16   Temp:  97.5 °F (36.4 °C) 97.4 °F (36.3 °C) 97.4 °F (36.3 °C)   TempSrc:  Oral Oral Oral   SpO2: 97% 96% 97% 95%   Weight:    88 kg (194 lb 0.1 oz)   Height:           Intake/Output last 3 shifts:  I/O last 3 completed shifts:  In: 920 [P.O.:120; I.V.:800]  Out: -   Intake/Output this shift:  No intake/output data recorded.      Radiology  Imaging Results (Last 24 Hours)     Procedure Component Value Units Date/Time    XR Chest 1 View [[id number]] Collected:  05/04/20 1017     Updated:  05/04/20 1023    Narrative:       DATE OF  EXAM:  5/4/2020 10:12 AM     PROCEDURE:  XR CHEST 1 VW-     INDICATIONS:  Shortness of breath, cough, past tobacco abuse, right lung cancer with  metastatic disease to the bones.      COMPARISON:  Chest x-ray and CT of the chest performed on 04/28/2020.     TECHNIQUE:   Single radiographic view of the chest was obtained.     FINDINGS:  There is worsening consolidation in the right lung presumably  representing known malignancy versus pneumonia versus hemorrhage. There  is no pneumothorax or pneumomediastinum. The left lung is clear. There  are no pleural effusions.  The patient has had previous cardiac surgery.  The heart size is normal. The pulmonary vascular markings are normal.  There is an osteolytic lesion chest appreciated in the left glenoid  consistent with the osseous metastatic disease.       Impression:          1. Worsening consolidation in the right lung presumably representing  known malignancy versus pneumonia versus hemorrhage.  2. No pneumothorax or pneumomediastinum.  3. Osseous metastatic disease best appreciated in left glenoid. There  are additional lesions which were seen on the previous CT of the chest.     Electronically Signed By-Kedar Cruz On:5/4/2020 10:21 AM  This report was finalized on [id number] by  Kedar Cruz, .    [place] < 1 Hour [[id number]] Resulted:  05/04/20 0931     Updated:  05/04/20 0932          Labs:  Results from last 7 days   Lab Units 05/03/20  0425   WBC 10*3/mm3 10.10   HEMOGLOBIN g/dL 10.9*   HEMATOCRIT % 30.3*   PLATELETS 10*3/mm3 217     Results from last 7 days   Lab Units 05/03/20  0425   SODIUM mmol/L 129*   POTASSIUM mmol/L 4.2   CHLORIDE mmol/L 93*   CO2 mmol/L 23.0   BUN mg/dL 28*   CREATININE mg/dL 1.15   CALCIUM mg/dL 8.6   BILIRUBIN mg/dL 0.4   ALK PHOS U/L 141*   ALT (SGPT) U/L 15   AST (SGOT) U/L 17   GLUCOSE mg/dL 350*         Results from last 7 days   Lab Units 05/03/20  0425 05/01/20  0604 04/28/20  1923   ALBUMIN g/dL 3.50 3.50 4.10     Results from  last 7 days   Lab Units 04/28/20  1923   TROPONIN T ng/mL <0.010             Results from last 7 days   Lab Units 05/04/20  0409   INR  1.19*               Meds:   SCHEDULE    ALPRAZolam 1 mg Oral Nightly   amLODIPine 10 mg Oral Daily   ampicillin-sulbactam 3 g Intravenous Q6H   atenolol 25 mg Oral BID   dexamethasone 4 mg Oral Q12H   enoxaparin 40 mg Subcutaneous Q24H   famotidine 20 mg Oral Daily   ferrous sulfate 324 mg Oral BID With Meals   finasteride 5 mg Oral Nightly   glipizide 10 mg Oral QAM AC   insulin lispro 0-14 Units Subcutaneous TID AC   insulin lispro 10 Units Subcutaneous TID With Meals   insulin regular 4 Units Subcutaneous BID   isosorbide mononitrate 60 mg Oral Daily   losartan 100 mg Oral Daily   rosuvastatin 10 mg Oral Daily   sertraline 25 mg Oral Daily   sodium chloride 10 mL Intravenous Q12H     Infusions    sodium chloride 9 mL/hr Last Rate: 9 mL/hr (05/04/20 0805)     PRNs  •  ALPRAZolam  •  calcium carbonate  •  dextrose  •  dextrose  •  dextrose  •  glucagon (human recombinant)  •  HYDROcodone-acetaminophen  •  insulin lispro **AND** insulin lispro  •  magnesium hydroxide  •  nitroglycerin  •  nitroglycerin  •  ondansetron  •  promethazine **OR** promethazine **OR** [DISCONTINUED] promethazine **OR** [DISCONTINUED] promethazine  •  sennosides-docusate  •  [COMPLETED] Insert peripheral IV **AND** sodium chloride  •  sodium chloride  •  sodium chloride  •  zolpidem    Physical Exam:  Physical Exam   Constitutional: He is oriented to person, place, and time.   Cardiovascular:   Murmur heard.  Pulmonary/Chest: No respiratory distress. He has no wheezes. He exhibits no tenderness.   Abdominal: He exhibits no distension. There is no tenderness.   Neurological: He is alert and oriented to person, place, and time.   Skin: Skin is warm and dry.       ROS  Review of Systems   HENT: Negative for rhinorrhea and sneezing.    Respiratory: Negative for apnea, cough, choking, chest tightness, wheezing  and stridor.    Cardiovascular: Negative for leg swelling.             Total time spent with patient greater than: 1 Hour

## 2020-05-05 NOTE — CONSULTS
"Nutrition Services    Patient Name:  Clemente Salinas  YOB: 1945  MRN: [id number]  Admit Date:  4/28/2020    Comments: Continue Consistent Carb diet. Changing supplement to Boost Glucose Control BID noting elevated blood glucose with improved po intake.    S/w patient via room telephone to complete consult for nutrition education.      Reason for Assessment                Reason for Assessment    Reason For Assessment 5/5/20: Follow up protocol, Education consult    Date: 4/30/20  MST 2       Diagnosis H&P: BPH, CAD, DMII, HLD, HTN, Myocardial infarction, Peripheral neuropathy, Vitamin D def, PVD     Current Problems:   Weight Loss    Cough and low grade fever  -COVID 19 negative    Right Lower lobe lung mass  -awaiting staging with likely mets to spine and brain    Hypercalemia    High D-Dimer    Anemia         Nutrition/Diet History                Nutrition/Diet History    Narrative     5/5: S/w pt via room telephone. States he has been feeling much better with improved appetite (although has forced himself to eat mostly). States he feels the Boost supplements have helped a lot, just concerned with sugar content. Encouraged pt to continue supplement at home, answered questions regarding nutrition and cancer treatment as well as provided a website ([facility]) for additional resources.    4/30 D/W pt appetite and he stated that he has not been eating well for the last 2-3 weeks. Typical days meals are: Breakfast: Cereal or toast with peanut butter Lunch: Ham [place] or [place] with banana, Dinner: Fast Food. Agreed to 1 Boost plus, 1 Boost Pudding, 1 Chocolate Ice cream      Functional Status  Retired/independent     Food Allergies NKFA     Factors Affecting Nutritional Intake Lung Mass, decreased appetite         Anthropometrics             Anthropometrics    Height 69\"     Weight 194lb, 5/5    182 lb 15.7 oz (4/28/20)       Admit Weight    Admit Weight 183 lb 13.8 oz (4/28/20)       Ideal Body " Weight (IBW)    Ideal Body Weight (IBW) 160 lb     % Ideal Body Weight 121%        Usual Body Weight (UBW)    Usual Body Weight 200 lb     % Usual Body Weight 97%     Weight Hx  209 lb (1/6/20)-down 12.9% x 3 months   204 lb (8/21/19)  212 lb (7/1/19)  214 lb (1/8/19)  212 lb (6/27/18)        Body Mass Index (BMI)    BMI (kg/m2) 28.65           Labs/Medications                Labs    Pertinent Lab Results Comments  Gluc 145-297 elev        Medications    Pertinent Medications Comments Pepcid, Ferrous Sulfate, Glucotrol, Humalog         Physical Findings                Physical Findings    Overall Physical Appearance Unable to observe r/t limiting face-to-face contact in the context of COVID19 outbreak      Edema  No edema     Gastrointestinal +BM 5/3     Tubes none     Oral/Mouth Cavity No swallowing or chewing problems per pt      Skin Skin intact          Estimated/Assessed Needs              Calorie Requirements     Height Used for Calorie Calculations       Weight Used for Calorie Calculations      Formula chosen for Calorie Calculations       Estimated Calorie Requirements (kcals/day)              Protein Requirements    Weight Used For Protein Calculations       Est Protein Requirement Amount (gms/kg)       Estimated Protein Requirements (gms/day)          Fluid Requirements     Estimated Fluid Requirements (mL/day)            Fluid Deficit       Current Sodium Level (mEq/L)      Desired Sodium Level (mEq/L)      Estimated Fluid Deficit Needs (L)           Nutrition Prescription Ordered                Nutrition Prescription PO    Current PO Diet  CONCARB     Supplement         Nutrition Prescription EN    Enteral Route -     TF modular -     TF Delivery Method -     Current Ordered TF  -     Current Ordered Water flush  -     TF Observation  -        Nutrition Prescription TPN    TPN Route -     Current Ordered TPN Volume  -     Dextrose (gm/kcals)  -     Amino Acid (gm/kcals) -     Lipids  (ML/Concentration/Frequency)  -     TPN Observation  -          Evaluation of Received Nutrient/Fluid Intake                PO Evaluation    % PO Intake 85% last 5 meals         EN Evaluation    TF Changes -     TF Residual -     TF Tolerance      Average EN Delivered  -        TPN Evaluation    Total Number of Days on TPN  -           Clinical Course      Clinical Nutrition Course Details  CC or CC/HH since admission with intermittent NPO status for tests         Problem/Interventions:                     Nutrition Diagnoses Problem 1      Problem 1   Unintentional weight loss related to lung mass with other metastatic disease as evidenced by weight loss of 7% x 4 months.      Nutrition Diagnoses Problem 2      Problem 2            Intervention Goal                Intervention Goal    General Meal intake > 75%     Consumes supplements          Nutrition Intervention                Nutrition Intervention    RD/Tech Action Changing supplements to Boost GC BID, continue Boost pudding.         Nutrition Prescription                Nutrition Prescription PO      Diet  CONCARB     Supplement Boost Pudding, Boost GC BID        Nutrition Prescription EN    Enteral Prescription -        Nutrition Prescription TPN    TPN Prescription -         Monitor/Evaluation                Monitor/Evaluation    Monitor M/E wt, intake, labs, meds, BM          Electronically signed by:  Brielle Savage RD  05/05/20 12:25

## 2020-05-05 NOTE — PLAN OF CARE
Patient is doing well today. Patient is going to go home today with a hard c-spine neck collar and then come back Thursday for surgery.

## 2020-05-05 NOTE — PROGRESS NOTES
Hematology/Oncology Inpatient Progress Note    PATIENT NAME: Clemente Salinas  : 1945  MRN: [id number]    CHIEF COMPLAINT: Right lower lobe lung mass    HISTORY OF PRESENT ILLNESS:    74 y.o. male admitted through the ED on 2020 complaining of bilateral shoulder pain and back pain.  It seemed to be worse on the left side.  He claims that he is been feeling weak for about a month prior to admission.  He had a cough without fever in 2019 and his wife had a cough also at that time which has since resolved. He had a course of steroids for the shoulder pain without relief. He has lost 20 pounds of weight since 2019.  He has shortness of air on exertion but no hemoptysis.  He denies nausea.  He has had chronic swelling of the left right foot after coronary artery bypass surgery.     20  Hematology/Oncology was consulted for lung mass.  Admission labs revealed calcium 12.6 (8.6-10.5), alkaline phosphatase 187 (), d-dimer 3.77 (0.17-0.59), WBC 8.6 hemoglobin 12.7 MCV 81.8 RDW 16.7 platelet count 261,000, RVP negative.  SARS-CoV-2 was not detected.  Chest x-ray revealed a right perihilar and basilar patchy density and CT chest reveals dense masslike consolidation in the apical segment of the right lower bowel with surrounding opacities.  Nodular opacities in both lungs were present.  Diffuse lytic osseous metastatic disease was present with large destructive destructive lesion in the right C 7 posterior elements with large lytic lesion of the T8, T12 and L1 vertebral bodies.  Possible metastatic soft tissue nodule in the lateral left chest wall/axilla was present.  Pulmonary consultation was obtained.     Patient is a retired teacher and quit smoking in .    Interval work-up  · 2020 iron 44 (), TIBC 350 (298-436), iron saturation 13 (20-50), ferritin 115.7 (), ionized calcium 1.57 (1.2-1.3).  · CT abdomen and pelvis showed 2 small liver lesions measuring up to 14  mm, indeterminate for cyst versus metastatic disease.  CT cervical, thoracic, and lumbar spine showed extensive lytic lesions.  There was a pathologic fracture at C5 with involvement of the left transverse process and transverse foramen.  There was a lytic lesion at C6-7. There were areas of cortical breakthrough and nondisplaced pathologic fracture in the lumbar spine.  No evidence of retropulsion or soft tissue tumoral extension into the osseous spinal canal.  CT head was negative for metastatic disease.  · MRI brain showed 11 x 7 mm subcortical left frontal lobe lesion felt possibly metastatic disease with differential also including subacute infarct.  No hemorrhage or edema and patient asymptomatic.  Plan to follow with repeat imaging as outpatient.  · MRI C and T-spine showed extensive osseous metastatic disease most pronounced on the right at C6-7.  There is no abnormal enhancement of epidural space or cervical spinal cord and no significant central canal narrowing.  There is limited evaluation of the neural foramina with suspected narrowing at sites of metastatic destruction including C4-C5, right C5-C6, and right C6-C7.  The largest lesion in the T-spine was at T5 with mild expansion which had peripheral enhancement and measured 3.6 x 2.3 x 2.4 cm.  There is no significant central canal narrowing or neural foraminal narrowing.  There were enhancing lesions involving many of the ribs.  · 5/4/2020 navigational bronchoscopy by  Draw: FNA from right lower lobe mass positive for non-small cell lung cancer possible adeno. FNA from R12 lobar lymph node suspicious for malignancy.     PCP: Sola Guerrero MD    Subjective   Feeling well. Pain controlled.     ROS:  Review of Systems   Constitutional: Negative for activity change, chills, fatigue, fever and unexpected weight change.   HENT: Negative for congestion, dental problem, hearing loss, mouth sores, nosebleeds, sore throat and trouble swallowing.         Dry  mouth   Eyes: Negative for photophobia and visual disturbance.   Respiratory: Negative for cough, chest tightness and shortness of breath.    Cardiovascular: Negative for chest pain, palpitations and leg swelling.   Gastrointestinal: Negative for abdominal distention, abdominal pain, blood in stool, constipation, diarrhea, nausea and vomiting.   Endocrine: Negative for cold intolerance and heat intolerance.   Genitourinary: Negative for decreased urine volume, difficulty urinating, frequency, hematuria and urgency.   Musculoskeletal: Positive for neck pain. Negative for arthralgias, back pain and gait problem.   Skin: Negative for rash and wound.   Neurological: Negative for dizziness, tremors, weakness, light-headedness, numbness and headaches.   Hematological: Negative for adenopathy. Does not bruise/bleed easily.   Psychiatric/Behavioral: Negative for confusion and hallucinations. The patient is not nervous/anxious.    All other systems reviewed and are negative.     MEDICATIONS:    Scheduled Meds:      ALPRAZolam 1 mg Oral Nightly   amLODIPine 10 mg Oral Daily   ampicillin-sulbactam 3 g Intravenous Q6H   atenolol 25 mg Oral BID   dexamethasone 4 mg Oral Q12H   enoxaparin 40 mg Subcutaneous Q24H   famotidine 20 mg Oral Daily   ferrous sulfate 324 mg Oral BID With Meals   finasteride 5 mg Oral Nightly   glipizide 10 mg Oral QAM AC   insulin lispro 0-14 Units Subcutaneous TID AC   insulin lispro 10 Units Subcutaneous TID With Meals   insulin regular 4 Units Subcutaneous BID   isosorbide mononitrate 60 mg Oral Daily   losartan 100 mg Oral Daily   rosuvastatin 10 mg Oral Daily   sertraline 25 mg Oral Daily   sodium chloride 10 mL Intravenous Q12H      Continuous Infusions:      sodium chloride 9 mL/hr Last Rate: 9 mL/hr (05/04/20 0805)      PRN Meds:  •  ALPRAZolam  •  calcium carbonate  •  dextrose  •  dextrose  •  dextrose  •  glucagon (human recombinant)  •  HYDROcodone-acetaminophen  •  insulin lispro **AND**  "insulin lispro  •  magnesium hydroxide  •  nitroglycerin  •  nitroglycerin  •  ondansetron  •  promethazine **OR** promethazine **OR** [DISCONTINUED] promethazine **OR** [DISCONTINUED] promethazine  •  sennosides-docusate  •  [COMPLETED] Insert peripheral IV **AND** sodium chloride  •  sodium chloride  •  sodium chloride  •  zolpidem     ALLERGIES:  No Known Allergies    Objective    VITALS:   BP (!) 181/72 (BP Location: Right arm, Patient Position: Sitting)   Pulse 69   Temp 97.4 °F (36.3 °C) (Oral)   Resp 16   Ht 175.3 cm (69\")   Wt 88 kg (194 lb 0.1 oz)   SpO2 97%   BMI 28.65 kg/m²     PHYSICAL EXAM:  Physical Exam   Constitutional: He is oriented to person, place, and time. He appears well-developed and well-nourished. No distress.   HENT:   Head: Normocephalic and atraumatic.   Nose: Nose normal.   Mouth/Throat: No oropharyngeal exudate.   Dental fillings   Eyes: Pupils are equal, round, and reactive to light. Conjunctivae and EOM are normal. No scleral icterus.   Eyeglasses   Neck: Normal range of motion. Neck supple.   Cardiovascular: Normal rate, regular rhythm and intact distal pulses.   No murmur heard.  Grade 2 systolic murmur, monitor leads   Pulmonary/Chest: Effort normal and breath sounds normal. No respiratory distress. He has no wheezes. He has no rales.   Abdominal: Soft. Bowel sounds are normal. He exhibits no distension and no mass. There is no tenderness. There is no guarding.   Genitourinary:   Genitourinary Comments: Deferred    Musculoskeletal: Normal range of motion. He exhibits no edema, tenderness or deformity.   LUE IV   Lymphadenopathy:     He has no cervical adenopathy.     He has no axillary adenopathy.        Right: No supraclavicular adenopathy present.        Left: No supraclavicular adenopathy present.   Neurological: He is alert and oriented to person, place, and time. Coordination normal.   Skin: Skin is warm and dry. No rash noted. He is not diaphoretic. No erythema. No " pallor.   Psychiatric: He has a normal mood and affect. His behavior is normal. Thought content normal.   Nursing note and vitals reviewed.      RECENT LABS:  Lab Results (last 24 hours)     Procedure Component Value Units Date/Time    AFB Culture - Lavage, Bronchus [[id number]] Collected:  05/04/20 0854    Specimen:  Lavage from Bronchus Updated:  05/05/20 1058     AFB Stain No acid fast bacilli seen    POC Glucose Once [[id number]]  (Abnormal) Collected:  05/05/20 0827    Specimen:  Blood Updated:  05/05/20 0829     Glucose 297 mg/dL      Comment: Serial Number: [id number]:  [id number]       Respiratory Culture - Lavage, Bronchus [[id number]] Collected:  05/04/20 0854    Specimen:  Lavage from Bronchus Updated:  05/05/20 0727     Respiratory Culture No growth     Gram Stain Few (2+) WBCs seen      No organisms seen    POC Glucose Once [[id number]]  (Abnormal) Collected:  05/04/20 1653    Specimen:  Blood Updated:  05/04/20 1655     Glucose 145 mg/dL      Comment: Serial Number: [id number]:  [id number]             PENDING RESULTS:  stool Hemoccult, bronchoscopy pathology    IMAGING REVIEWED:  Xr Chest 1 View    Result Date: 5/4/2020   1. Worsening consolidation in the right lung presumably representing known malignancy versus pneumonia versus hemorrhage. 2. No pneumothorax or pneumomediastinum. 3. Osseous metastatic disease best appreciated in left glenoid. There are additional lesions which were seen on the previous CT of the chest.  Electronically Signed By-Kedar Cruz On:5/4/2020 10:21 AM This report was finalized on [id number] by  Kedar Cruz, .      I have reviewed the patient's labs, imaging, reports, and other clinician documentation.    Assessment/Plan   ASSESSMENT:  1. Right lower lobe lung mass -  Along with other metastatic disease as well as lytic bone lesions, most likely primary lung malignancy with metastasis.  Bronchoscopy with transbronchial biopsy suggestive of adenocarcinoma.   Has  extensive bony metastatic disease of the spine and ribs.  On dexamethasone twice daily per neurosurgery for spinal mets with work-up in progress-discussed with Dr. Ha.  Possible brain met on MRI without edema or hemorrhage and patient asymptomatic.    2. Hypercalcemia -  Ionized calcium was high and patient s/p Zometa infusion given 4/29.  Calcium level has normalized.  3. High d-dimer -  Likely from malignancy.  If patient develops any leg pain or new swelling, will check venous Doppler.  On prophylactic Lovenox.  4. Anemia -    Iron sat low. Stool Hemoccult pending and iron replacement initiated.  5. HTNCAD/PVD -Plavix and aspirin held.  Prophylactic Lovenox initiated pending biopsies.  6. DM -  Per primary care.    PLAN:    1. Surgical plan to stabilize spine in progress. With plan for radiation and systemic therapy to follow.  Would recommend discharging patient with cervical collar and then readmission for surgery arrangements made.  2. Will need molecular studies to determine systemic treatment options.   3. Hold off on infusaport placement as if ROS or ALK positive, these treatments are oral.   4. Follow Hgb and calcium levels.      Proper PPE worn given coronavirus pandemic. Electronically signed by SAMY Mohr, 05/05/20, 1:23 PM.    I have personally performed a face-to-face diagnostic evaluation via telehealth on this patient.  I have discussed the case with Isidro Perdue NP, have edited/reviewed the note,  and agree with the care plan.  Patient is doing well at present except for a dry mouth and on examination has a mild systolic murmur.  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  Have discussed case with Dr. Ha and plan is to discharge patient with a cervical collar so that molecular testing could be ordered on the tumor.  Dr. Angel will then admit the patient for C5 stabilization after which the patient may need to receive  local radiation or systemic therapy.    I discussed the patients findings and my recommendations with patient and consulting provider.    Fili Cordova MD  05/05/20  12:51

## 2020-05-05 NOTE — PROGRESS NOTES
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  Talking to him I called him a Medrol Dosepak fortunately the medication not resolve his pain.  He called me with some vague symptoms.  He has been saying that he has had generalized weakness, a very mild cough, cage and shortness of breath, weight loss of 20 pounds or more and some nonspecific pain in the abdomen and chest.  After talking to him we have decided to send him to the emergency room.  Work-up in the emergency room indicated that his chest x-ray was abnormal and showed a right sugey-hilar and right basilar patchy density, indicative of pneumonia.  There was chronic changes in both shoulders.  A CT of the chest PE protocol showed no evidence of pulmonary embolism.  Also showed a dense masslike consolidation of the apical segment of the right lower lobe, measuring approximately 3 cm x 3.8 cm, with surrounding patchy groundglass opacities.  But the CT of the chest indicated views lytic osseous lesions in the right posterior element of the C7 vertebral segment with associated stenosis of the right C6-7 and C7-T1 neural woods.  There is also lytic lesions in the T8, T12 and L1 vertebrae.  There were multiple large lytic lesions in the left scapula.  His comprehensive metabolic panel showed a calcium of 12.6, his ionized calcium was 1.57.  His COVID-19 was not detected.  His respiratory panel PCR was negative.  His white count was 8600 with a hemoglobin of 12.7.Patient is a 74 y.o.   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  Talking to him I called him a Medrol Dosepak fortunately the medication not resolve his pain.  He called me with some vague symptoms.  He has been saying that he has had generalized weakness, a very mild cough, cage and shortness of breath, weight loss of 20 pounds or more and some nonspecific pain in the abdomen and chest.  After talking to him we have decided to send him to the emergency room.  Work-up in the emergency room indicated that his chest x-ray was abnormal and showed a right sugey-hilar and right basilar patchy density, indicative of pneumonia.  There was chronic changes in both shoulders.  A CT of the chest PE protocol showed no evidence of pulmonary embolism.  Also showed a dense masslike consolidation of the apical segment of the right lower lobe, measuring approximately 3 cm x 3.8 cm, with surrounding patchy groundglass opacities.  But the CT of the chest indicated views lytic osseous lesions in the right posterior element of the C7 vertebral segment with associated stenosis of the right C6-7 and C7-T1 neural woods.  There is also lytic lesions in the T8, T12 and L1 vertebrae.  There were multiple large lytic lesions in the left scapula.  His comprehensive metabolic panel showed a calcium of 12.6, his ionized calcium was 1.57.  His COVID-19 was not detected.  His respiratory panel PCR was negative.  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  Talking to him I called him a Medrol Dosepak fortunately the medication not resolve his pain.  He called me with some vague symptoms.  He has been saying that he has had generalized weakness, a very mild  cough, cage and shortness of breath, weight loss of 20 pounds or more and some nonspecific pain in the abdomen and chest.  After talking to him we have decided to send him to the emergency room.  Work-up in the emergency room indicated that his chest x-ray was abnormal and showed a right sugey-hilar and right basilar patchy density, indicative of pneumonia.  There was chronic changes in both shoulders.  A CT of the chest PE protocol showed no evidence of pulmonary embolism.  Also showed a dense masslike consolidation of the apical segment of the right lower lobe, measuring approximately 3 cm x 3.8 cm, with surrounding patchy groundglass opacities.  But the CT of the chest indicated views lytic osseous lesions in the right posterior element of the C7 vertebral segment with associated stenosis of the right C6-7 and C7-T1 neural woods.  There is also lytic lesions in the T8, T12 and L1 vertebrae.  There were multiple large lytic lesions in the left scapula.  His comprehensive metabolic panel showed a calcium of 12.6, his ionized calcium was 1.57.  His COVID-19 was not detected.  His respiratory panel PCR was negative.  His white count was 8600 with a hemoglobin of 12.7.  Further examinations today has shown that the patient has lytic lesions in the thoracic and lumbar regions.  CT of the abdomen and pelvis showed that there is small lesions in the liver.  CT scan of the head was normal.    MRI of the brain showed Single 7 mm focus of enhancement in the subcortical left frontal lobe,  likely a metastatic lesion. However, given the single lesion and linear  shape along the cortex, subacute infarct is possible, but considered  less likely. No associated restricted diffusion.    Navigational Bronchoscopy with biopsy result preliminary nonsmall Call CA  Patient Complaints: Patient complains of some pain he is feeling very anxious and sad and feels distressed.  His pain when he is trying to ambulate.  Trouble sleeping last  "night.  He has minimal cough.  There is no hemoptysis.  There is no painful breathing.  Appetite is poor.  History taken from: patient    Review of Systems   Constitutional: Positive for appetite change and unexpected weight change.   HENT: Negative.    Eyes: Negative.    Respiratory: Negative.    Cardiovascular: Negative.    Gastrointestinal: Negative.    Endocrine: Negative.    Genitourinary: Negative.    Musculoskeletal: Positive for arthralgias, back pain and neck pain.   Skin: Negative.    Allergic/Immunologic: Negative.    Neurological: Negative.    Hematological: Negative.    Psychiatric/Behavioral: Positive for sleep disturbance. The patient is nervous/anxious.         Depressed mood           Objective     Vital Signs  /67 (BP Location: Right arm, Patient Position: Lying)   Pulse 52   Temp 97.4 °F (36.3 °C) (Oral)   Resp 16   Ht 175.3 cm (69\")   Wt 88.1 kg (194 lb 3.2 oz)   SpO2 97%   BMI 28.68 kg/m²       Physical Exam:     General Appearance:    Alert, cooperative, in no acute distress   Head:    Normocephalic, without obvious abnormality, atraumatic   Eyes:            Lids and lashes normal, conjunctivae and sclerae normal, no   icterus, no pallor, corneas clear, PERRLA   Ears:    Ears appear intact with no abnormalities noted   Throat:   No oral lesions, no thrush, oral mucosa moist   Neck:   No adenopathy, supple, trachea midline, no thyromegaly, no   carotid bruit, no JVD   Lungs:     Clear to auscultation,respirations regular, even and                  unlabored    Heart:    Regular rhythm and normal rate, normal S1 and S2, no            murmur, no gallop, no rub, no click   Chest Wall:    No abnormalities observed   Abdomen:     Normal bowel sounds, no masses, no organomegaly, soft        non-tender, non-distended, no guarding, no rebound                tenderness   Extremities:   Moves all extremities well, no edema, no cyanosis, no             redness   Pulses:   Pulses palpable and " equal bilaterally   Skin:   No bleeding, bruising or rash   Lymph nodes:   No palpable adenopathy   Neurologic:   Cranial nerves 2 - 12 grossly intact, sensation intact, DTR       present and equal bilaterally        Results Review:    Lab Results (last 24 hours)     Procedure Component Value Units Date/Time    POC Glucose Once [[id number]]  (Abnormal) Collected:  05/04/20 1653    Specimen:  Blood Updated:  05/04/20 1655     Glucose 145 mg/dL      Comment: Serial Number: [id number]:  [id number]       POC Glucose Once [[id number]]  (Abnormal) Collected:  05/04/20 1206    Specimen:  Blood Updated:  05/04/20 1210     Glucose 243 mg/dL      Comment: Serial Number: [id number]:  [id number]       Respiratory Panel, PCR - Lavage, Bronchus [[id number]]  (Normal) Collected:  05/04/20 0854    Specimen:  Lavage from Bronchus Updated:  05/04/20 1203     ADENOVIRUS, PCR Not Detected     Coronavirus 229E Not Detected     Coronavirus HKU1 Not Detected     Coronavirus NL63 Not Detected     Coronavirus OC43 Not Detected     Human Metapneumovirus Not Detected     Human Rhinovirus/Enterovirus Not Detected     Influenza B PCR Not Detected     Parainfluenza Virus 1 Not Detected     Parainfluenza Virus 2 Not Detected     Parainfluenza Virus 3 Not Detected     Parainfluenza Virus 4 Not Detected     Bordetella pertussis pcr Not Detected     Influenza A H1 2009 PCR Not Detected     Chlamydophila pneumoniae PCR Not Detected     Mycoplasma pneumo by PCR Not Detected     Influenza A PCR Not Detected     Influenza A H3 Not Detected     Influenza A H1 Not Detected     RSV, PCR Not Detected    Narrative:       The coronavirus on the RVP is NOT COVID-19 and is NOT indicative of infection with COVID-19.     Respiratory Culture - Lavage, Bronchus [[id number]] Collected:  05/04/20 0854    Specimen:  Lavage from Bronchus Updated:  05/04/20 1149     Gram Stain Few (2+) WBCs seen      No organisms seen    Non-gynecologic Cytology [[id number]]  Collected:  05/04/20 0854    Specimen:  Lavage from Bronchus; Brushing from Lung, Right Lower Lobe Updated:  05/04/20 1100    Fine Needle Aspiration [[id number]] Collected:  05/04/20 0904    Specimen:  Fine Needle Aspirate from Lung, Right Lower Lobe; Fine Needle Aspirate from Lymph Node; Lung, Right Lower Lobe; Lymph Node Updated:  05/04/20 1032    Pneumocystis PCR - Wash, Bronchus [[id number]] Collected:  05/04/20 1023    Specimen:  Wash from Bronchus Updated:  05/04/20 1023    Tissue Pathology Exam [[id number]] Collected:  05/04/20 0912    Specimen:  Tissue from Lung, Right Lower Lobe Updated:  05/04/20 1021    Fungus Culture - Lavage, Bronchus [[id number]] Collected:  05/04/20 0854    Specimen:  Lavage from Bronchus Updated:  05/04/20 1021    AFB Culture - Lavage, Bronchus [[id number]] Collected:  05/04/20 0854    Specimen:  Lavage from Bronchus Updated:  05/04/20 1021    Cytomegalovirus (CMV) By PCR - Lavage, Bronchus [[id number]] Collected:  05/04/20 0854    Specimen:  Lavage from Bronchus Updated:  05/04/20 1020    POC Glucose Once [[id number]]  (Abnormal) Collected:  05/04/20 0948    Specimen:  Blood Updated:  05/04/20 0949     Glucose 286 mg/dL      Comment: Serial Number: [id number]:  [id number]       POC Glucose Once [[id number]]  (Abnormal) Collected:  05/04/20 0749    Specimen:  Blood Updated:  05/04/20 0756     Glucose 318 mg/dL      Comment: Serial Number: [id number]:  [id number]       Protime-INR [[id number]]  (Abnormal) Collected:  05/04/20 0409    Specimen:  Blood Updated:  05/04/20 0503     Protime 12.1 Seconds      INR 1.19    POC Glucose Once [[id number]]  (Abnormal) Collected:  05/03/20 2103    Specimen:  Blood Updated:  05/03/20 2349     Glucose 305 mg/dL      Comment: Serial Number: [id number]:  [id number]              ECG/EMG Results (last 24 hours)     ** No results found for the last 24 hours. **          Imaging Results (Last 24 Hours)     Procedure Component Value Units Date/Time     XR Chest 1 View [[id number]] Collected:  05/04/20 1017     Updated:  05/04/20 1023    Narrative:       DATE OF EXAM:  5/4/2020 10:12 AM     PROCEDURE:  XR CHEST 1 VW-     INDICATIONS:  Shortness of breath, cough, past tobacco abuse, right lung cancer with  metastatic disease to the bones.      COMPARISON:  Chest x-ray and CT of the chest performed on 04/28/2020.     TECHNIQUE:   Single radiographic view of the chest was obtained.     FINDINGS:  There is worsening consolidation in the right lung presumably  representing known malignancy versus pneumonia versus hemorrhage. There  is no pneumothorax or pneumomediastinum. The left lung is clear. There  are no pleural effusions.  The patient has had previous cardiac surgery.  The heart size is normal. The pulmonary vascular markings are normal.  There is an osteolytic lesion chest appreciated in the left glenoid  consistent with the osseous metastatic disease.       Impression:          1. Worsening consolidation in the right lung presumably representing  known malignancy versus pneumonia versus hemorrhage.  2. No pneumothorax or pneumomediastinum.  3. Osseous metastatic disease best appreciated in left glenoid. There  are additional lesions which were seen on the previous CT of the chest.     Electronically Signed By-Kedar Cruz On:5/4/2020 10:21 AM  This report was finalized on [id number] by  Kedar Cruz, .    FL < 1 Hour [[id number]] Resulted:  05/04/20 0931     Updated:  05/04/20 0932               I reviewed the patient's new clinical results.    Medication Review:   Scheduled Meds:    ALPRAZolam 1 mg Oral Nightly   amLODIPine 10 mg Oral Daily   ampicillin-sulbactam 3 g Intravenous Q6H   atenolol 25 mg Oral BID   dexamethasone 4 mg Oral Q12H   enoxaparin 40 mg Subcutaneous Q24H   famotidine 20 mg Oral Daily   ferrous sulfate 324 mg Oral BID With Meals   finasteride 5 mg Oral Nightly   glipizide 10 mg Oral QAM AC   insulin lispro 0-14 Units Subcutaneous TID AC      insulin lispro 10 Units Subcutaneous TID With Meals   insulin regular 4 Units Subcutaneous BID   isosorbide mononitrate 60 mg Oral Daily   losartan 100 mg Oral Daily   rosuvastatin 10 mg Oral Daily   sertraline 25 mg Oral Daily   sodium chloride 10 mL Intravenous Q12H     Continuous Infusions:    sodium chloride 9 mL/hr Last Rate: 9 mL/hr (05/04/20 0805)     PRN Meds:.•  ALPRAZolam  •  calcium carbonate  •  dextrose  •  dextrose  •  dextrose  •  glucagon (human recombinant)  •  HYDROcodone-acetaminophen  •  insulin lispro **AND** insulin lispro  •  magnesium hydroxide  •  nitroglycerin  •  nitroglycerin  •  ondansetron  •  promethazine **OR** promethazine **OR** [DISCONTINUED] promethazine **OR** [DISCONTINUED] promethazine  •  sennosides-docusate  •  [COMPLETED] Insert peripheral IV **AND** sodium chloride  •  sodium chloride  •  sodium chloride  •  zolpidem     Assessment/Plan     1. Right lower lobe lung mass, Non Small Cell CA with lytic lesions visible, most likely primary lung malignancy with metastasis.  2. Single 7 mm focus of enhancement in the subcortical left frontal lobe,likely a metastatic lesion      3. Neck pain, low back painand shoulder pain secondary to cervical / thoracic/ lumbar lytic lesions and herniation\neural foraminal narrowing suspected at theleft C4-C5 and right C5-C6 and right C6-C7  4. Remote history of tobacco use quit years ago history of 10 pack-year smoking/ History of asbestos exposure  5. Hypercalcemia.   6. High d-dimer.  7. Microcytic iron deficiency anemia  8. Diabetes mellitus type 2  9. Essential hypertension  10. Coronary artery disease with prior myocardial infarction  11. BPH asymptomatic  12. Mixed hyperlipidemia  13. Peripheral diabetic neuropathy  14. Vitamin D deficiency  15. History of coronary artery bypass graft  16. Atherosclerosis  17. Adjustment disorder with anxiety      Active Problems:    Malignant neoplasm of right lung (CMS/HCC)      The patient's pain is  well managed.  He is very anxious for the upcoming biopsy tomorrow.    Plan for disposition: Hopefully he will return to home    Sola Guerrero MD  05/04/20  22:08

## 2020-05-05 NOTE — PLAN OF CARE
Problem: Patient Care Overview  Goal: Plan of Care Review  Outcome: Ongoing (interventions implemented as appropriate)  Flowsheets (Taken 5/5/2020 4850)  Progress: no change  Plan of Care Reviewed With: patient  Outcome Summary: Patient has had some c/o pain which were treated with medication. Patient has continued to be bradycardic as a baseline while asleep. May possibly go to get fitted for radiation mask today. Will continue to monitor.

## 2020-05-06 NOTE — PROGRESS NOTES
Case Management Discharge Note      Final Note: Home with planned readmission for surgery on Thursday             Final Discharge Disposition Code: 01 - home or self-care

## 2020-05-06 NOTE — OUTREACH NOTE
Prep Survey      Responses   [facility] patient discharged from?  [facility]   Is LACE score < 7 ?  No   Eligibility  Readm Mgmt   Discharge diagnosis  Right lower lobe lung mass,  with lytic lesions visible, most likely primary lung malignancy with metastasis.     COVID-19 Test Status  Negative   Does the patient have one of the following disease processes/diagnoses(primary or secondary)?  Other   Does the patient have Home health ordered?  No   Is there a DME ordered?  No   Prep survey completed?  Yes          Marylou Mg RN

## 2020-05-06 NOTE — PROGRESS NOTES
Case Management Discharge Note      Final Note: Home       Final Discharge Disposition Code: 01 - home or self-care

## 2020-05-06 NOTE — DISCHARGE SUMMARY
Date of Discharge:  5/6/2020    Discharge Diagnosis:     1. Right lower lobe lung mass, Non Small Cell CA with lytic lesions visible, most likely primary lung malignancy with metastasis.  2. Single 7 mm focus of enhancement in the subcortical left frontal lobe,likely a metastatic lesion      3. Neck pain, low back painand shoulder pain secondary to cervical / thoracic/ lumbar lytic lesions and herniation\neural foraminal narrowing suspected at theleft C4-C5 and right C5-C6 and right C6-C7  4. Remote history of tobacco use quit years ago history of 10 pack-year smoking/ History of asbestos exposure  5. Hypercalcemia.   6. High d-dimer.  7. Microcytic iron deficiency anemia  8. Diabetes mellitus type 2  9. Essential hypertension  10. Coronary artery disease with prior myocardial infarction  11. BPH asymptomatic  12. Mixed hyperlipidemia  13. Peripheral diabetic neuropathy  14. Vitamin D deficiency  15. History of coronary artery bypass graft  16. Atherosclerosis  17. Adjustment disorder with anxiety    Presenting Problem/History of Present Illness  Active Hospital Problems    Diagnosis  POA   • **Malignant neoplasm of right lung (CMS/HCC) [C34.91]  Yes   • Metastatic cancer (CMS/HCC) [C79.9]  Unknown      Resolved Hospital Problems   No resolved problems to display.          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  Talking to him I called him a Medrol Dosepak fortunately the medication not resolve his pain.  He called me with some vague symptoms.  He has been saying that he has had generalized weakness, a very mild cough, cage and shortness of breath, weight loss of 20 pounds or more and some nonspecific pain in the abdomen and chest.  After talking to him we have decided to send him to the emergency room.  Work-up in the emergency room  indicated that his chest x-ray was abnormal and showed a right sugey-hilar and right basilar patchy density, indicative of pneumonia.  There was chronic changes in both shoulders.  A CT of the chest PE protocol showed no evidence of pulmonary embolism.  Also showed a dense masslike consolidation of the apical segment of the right lower lobe, measuring approximately 3 cm x 3.8 cm, with surrounding patchy groundglass opacities.  But the CT of the chest indicated views lytic osseous lesions in the right posterior element of the C7 vertebral segment with associated stenosis of the right C6-7 and C7-T1 neural woods.  There is also lytic lesions in the T8, T12 and L1 vertebrae.  There were multiple large lytic lesions in the left scapula.  His comprehensive metabolic panel showed a calcium of 12.6, his ionized calcium was 1.57.  His COVID-19 was not detected.  His respiratory panel PCR was negative.  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  Talking to him I called him a Medrol Dosepak fortunately the medication not resolve his pain.  He called me with some vague symptoms.  He has been saying that he has had generalized weakness, a very mild cough, cage and shortness of breath, weight loss of 20 pounds or more and some nonspecific pain in the abdomen and chest.  After talking to him we have decided to send him to the emergency room.  Work-up in the emergency room indicated that his chest x-ray was abnormal and showed a right sugey-hilar and right basilar patchy density, indicative of pneumonia.  There was chronic changes in both shoulders.  A CT of the chest PE protocol showed no evidence of pulmonary embolism.  Also showed a dense masslike consolidation of the apical segment of the right lower lobe, measuring  approximately 3 cm x 3.8 cm, with surrounding patchy groundglass opacities.  But the CT of the chest indicated views lytic osseous lesions in the right posterior element of the C7 vertebral segment with associated stenosis of the right C6-7 and C7-T1 neural woods.  There is also lytic lesions in the T8, T12 and L1 vertebrae.  There were multiple large lytic lesions in the left scapula.  His comprehensive metabolic panel showed a calcium of 12.6, his ionized calcium was 1.57.  His COVID-19 was not detected.  His respiratory panel PCR was negative.  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  Talking to him I called him a Medrol Dosepak fortunately the medication not resolve his pain.  He called me with some vague symptoms.  He has been saying that he has had generalized weakness, a very mild cough, cage and shortness of breath, weight loss of 20 pounds or more and some nonspecific pain in the abdomen and chest.  After talking to him we have decided to send him to the emergency room.  Work-up in the emergency room indicated that his chest x-ray was abnormal and showed a right sugey-hilar and right basilar patchy density, indicative of pneumonia.  There was chronic changes in both shoulders.  A CT of the chest PE protocol showed no evidence of pulmonary embolism.  Also showed a dense masslike consolidation of the apical segment of the right lower lobe, measuring approximately 3 cm x 3.8 cm, with surrounding patchy groundglass opacities.  But the CT of the chest indicated views lytic osseous lesions in the right posterior element of the C7 vertebral segment with associated stenosis of the right C6-7 and C7-T1 neural woods.  There is also lytic lesions in the T8, T12 and L1 vertebrae.  There were multiple  large lytic lesions in the left scapula.  His comprehensive metabolic panel showed a calcium of 12.6, his ionized calcium was 1.57.  His COVID-19 was not detected.  His respiratory panel PCR was negative.  His white count was 8600 with a hemoglobin of 12.7.  Further examinations today has shown that the patient has lytic lesions in the thoracic and lumbar regions.  CT of the abdomen and pelvis showed that there is small lesions in the liver.  CT scan of the head was normal.     MRI of the brain showed Single 7 mm focus of enhancement in the subcortical left frontal lobe,  likely a metastatic lesion. However, given the single lesion and linear  shape along the cortex, subacute infarct is possible, but considered  less likely. No associated restricted diffusion.     Navigational Bronchoscopy with biopsy result preliminary nonsmall Call CA  Patient Complaints: Patient complains of some pain he is feeling very anxious and sad and feels distressed.  His pain when he is trying to ambulate.  Trouble sleeping last night.  He has minimal cough.  There is no hemoptysis.  There is no painful breathing.  Appetite is poor.  History taken from: patient      Procedures Performed    Procedure(s):  BRONCHOSCOPY WITH BRONCHOALVEOLAR LAVAGE AND NAVIGATION WITH FINE NEEDLE ASPIRATION, BRUSHING, AND BIOPSY X1 AREA AND ULTRASOUND WITH FINE NEEDLE ASPIRATION  -------------------       Consults:   Consults     Date and Time Order Name Status Description    5/3/2020 1043 Inpatient Radiation Oncology Consult Completed     4/30/2020 1639 Inpatient Neurosurgery Consult Completed     4/29/2020 1236 Inpatient Pulmonology Consult Completed     4/28/2020 1909 Family Medicine Consult Completed           Pertinent Test Results:    Lab Results (most recent)     Procedure Component Value Units Date/Time    Respiratory Culture - Lavage, Bronchus [[id number]] Collected:  05/04/20 0854    Specimen:  Lavage from Bronchus Updated:  05/06/20 1026      "Respiratory Culture No growth     Gram Stain Few (2+) WBCs seen      No organisms seen    Non-gynecologic Cytology [[id number]] Collected:  05/04/20 0854    Specimen:  Lavage from Bronchus; Brushing from Lung, Right Lower Lobe Updated:  05/06/20 0946     Case Report --     Medical Cytology Report                           Case: [id number]                                  Authorizing Provider:  Jeb Velasquez MD             Collected:           05/04/2020 09:11 AM          Ordering Location:     [facility]  Received:            05/04/2020 11:00 AM                                 SUITES                                                                       Pathologist:           Jose Juan Garcia MD                                                           Specimens:   1) - Bronchus                                                                                       2) - Lung, Right Lower Lobe, slides                                                         Final Diagnosis --     Specimen #1 (Smears of bronchoalveolar lavage with cytospin preparation):    A few atypical cell groups, suspicious for carcinoma in a background of abundant ciliated bronchial epithelial cells     Specimen #2 (Smears of bronchial brushing with cytospin preparation):    Positive for malignancy, non-small cell carcinoma    CHANCE/sms        Gross Description --     1. Received in carbowax and designated \"Bronchoalveolar lavage\" are 25 mL of cloudy green colored fluid. Particulate matter is present. This specimen is processed as per protocol.  2. Received in 95% alcohol are four slides, designated \"Lung, right lower lobe.\" These are pap stained for evaluation.      Fine Needle Aspiration [[id number]] Collected:  05/04/20 0904    Specimen:  Fine Needle Aspirate from Lung, Right Lower Lobe; Fine Needle Aspirate from Lymph Node; Lung, Right Lower Lobe; Lymph Node Updated:  05/06/20 0945     Case Report --     Medical Cytology Report            " "               Case: [id number]                                  Authorizing Provider:  Jeb Velasquez MD             Collected:           05/04/2020 09:04 AM          Ordering Location:     [facility]  Received:            05/04/2020 10:32 AM                                 SUITES                                                                       Pathologist:           Jose Juan Garcia MD                                                           Specimens:   1) - Lung, Right Lower Lobe, slides                                                                 2) - Lung, Right Lower Lobe, cell block                                                             3) - Lymph Node, right hilar slides                                                                 4) - Lymph Node, cell block                                                                 Final Diagnosis --     Specimens #1 and #2 (Lung, right lower lobe, fine needle aspiration with smears and cell block):    Positive for malignancy, non-small cell carcinoma with immunohistochemical profile consistent with adenocarcinoma      (see COMMENT)     Specimens #3 and #4 (Hilar lymph node, fine needle aspiration with smears and cell block):    Rare atypical cells consistent with non-small cell carcinoma    CHANCE/sms        Comment --     The cell block (specimen #2) was stained via immunohistochemical technique utilizing appropriate controls for the presence of CK5/6, p63, TTF-1, and napsin. The tumor cells are positive for TTF-1 and napsin while being negative for CK5/6 and p63. This staining pattern in conjunction with the cytomorphologic and histologic features are entirely consistent with adenocarcinoma.    CHANCE/sms        Gross Description --     1. Received in 95% alcohol are three slides designated \"Fine needle aspiration of lung, right lower lobe.\" These are Pap stained for evaluation. Also received are three air dried slides from the same site. " "These are stained with Oliveira/Giemsa stain for immediate evaluation.    2. Cell block: Received in formalin labeled \"Right lower lobe cell block\" are multiple wispy fragments of reddish pink soft tissue the largest of which measures 0.2 cm. The specimen is filtered and submitted in its entirety in screen cassette 2.     3. Received in 95% alcohol are one slides designated \"Fine needle aspiration of hilar lymph node.\" These are Pap stained for evaluation. Also received are one air dried slides from the same site. These are stained with Oliveira/Giemsa stain for immediate evaluation.    4. Cell block: Received in formalin labeled \"Right hilar lymph node cell block\" are multiple fragments of pinkish red soft tissue and what appears to be blood clot. The largest fragment of blood clot measures 0.4 cm. The specimen is filtered and submitted in its entirety and submitted in screen cassette 4.     JPR/ClearMyMail          Intraoperative Consultation --     FNA Immediate Evaluation:  1. Pass One: Positive for malignancy.     Pass Two: Positive for malignancy.    3. Pass One: Atypical cells, suspicious.    Initial cytology interpretation performed by Brian Leslie MD.        Tissue Pathology Exam [[id number]] Collected:  05/04/20 0912    Specimen:  Tissue from Lung, Right Lower Lobe Updated:  05/06/20 0944     Case Report --     Surgical Pathology Report                         Case: [id number]                                  Authorizing Provider:  Jeb Velasquez MD             Collected:           05/04/2020 09:12 AM          Ordering Location:     [facility]  Received:            05/04/2020 10:21 AM                                 SUITES                                                                       Pathologist:           Jose Juan Garcia MD                                                           Specimen:    Lung, Right Lower Lobe, mass                                                                Final " "Diagnosis --     Lung, right lower lobe mass, biopsy:    Invasive moderately differentiated adenocarcinoma    CHANCE/sms        Gross Description --     A single specimen is received labeled \"Right lower lobe lung mass.\" Received in formalin are multiple fragments of reddish pink soft tissue the largest of which measures 0.3 cm. The specimen is filtered and submitted in its entirety in a single screen cassette.    JPR/sms        POC Glucose Once [[id number]]  (Abnormal) Collected:  05/05/20 1314    Specimen:  Blood Updated:  05/05/20 1315     Glucose 209 mg/dL      Comment: Serial Number: [id number]:  [id number]       AFB Culture - Lavage, Bronchus [[id number]] Collected:  05/04/20 0854    Specimen:  Lavage from Bronchus Updated:  05/05/20 1058     AFB Stain No acid fast bacilli seen    POC Glucose Once [[id number]]  (Abnormal) Collected:  05/05/20 0827    Specimen:  Blood Updated:  05/05/20 0829     Glucose 297 mg/dL      Comment: Serial Number: [id number]:  [id number]       Respiratory Panel, PCR - Lavage, Bronchus [[id number]]  (Normal) Collected:  05/04/20 0854    Specimen:  Lavage from Bronchus Updated:  05/04/20 1203     ADENOVIRUS, PCR Not Detected     Coronavirus 229E Not Detected     Coronavirus HKU1 Not Detected     Coronavirus NL63 Not Detected     Coronavirus OC43 Not Detected     Human Metapneumovirus Not Detected     Human Rhinovirus/Enterovirus Not Detected     Influenza B PCR Not Detected     Parainfluenza Virus 1 Not Detected     Parainfluenza Virus 2 Not Detected     Parainfluenza Virus 3 Not Detected     Parainfluenza Virus 4 Not Detected     Bordetella pertussis pcr Not Detected     Influenza A H1 2009 PCR Not Detected     Chlamydophila pneumoniae PCR Not Detected     Mycoplasma pneumo by PCR Not Detected     Influenza A PCR Not Detected     Influenza A H3 Not Detected     Influenza A H1 Not Detected     RSV, PCR Not Detected    Narrative:       The coronavirus on the RVP is NOT COVID-19 and " is NOT indicative of infection with COVID-19.     Pneumocystis PCR - Wash, Bronchus [[id number]] Collected:  05/04/20 1023    Specimen:  Wash from Bronchus Updated:  05/04/20 1023    Fungus Culture - Lavage, Bronchus [[id number]] Collected:  05/04/20 0854    Specimen:  Lavage from Bronchus Updated:  05/04/20 1021    Cytomegalovirus (CMV) By PCR - Lavage, Bronchus [[id number]] Collected:  05/04/20 0854    Specimen:  Lavage from Bronchus Updated:  05/04/20 1020    Protime-INR [[id number]]  (Abnormal) Collected:  05/04/20 0409    Specimen:  Blood Updated:  05/04/20 0503     Protime 12.1 Seconds      INR 1.19    Comprehensive Metabolic Panel [[id number]]  (Abnormal) Collected:  05/03/20 0425    Specimen:  Blood Updated:  05/03/20 0510     Glucose 350 mg/dL      BUN 28 mg/dL      Creatinine 1.15 mg/dL      Sodium 129 mmol/L      Potassium 4.2 mmol/L      Chloride 93 mmol/L      CO2 23.0 mmol/L      Calcium 8.6 mg/dL      Total Protein 6.1 g/dL      Albumin 3.50 g/dL      ALT (SGPT) 15 U/L      AST (SGOT) 17 U/L      Alkaline Phosphatase 141 U/L      Total Bilirubin 0.4 mg/dL      eGFR Non African Amer 63 mL/min/1.73      Globulin 2.6 gm/dL      A/G Ratio 1.3 g/dL      BUN/Creatinine Ratio 24.3     Anion Gap 13.0 mmol/L     Narrative:       GFR Normal >60  Chronic Kidney Disease <60  Kidney Failure <15      CBC & Differential [[id number]] Collected:  05/03/20 0425    Specimen:  Blood Updated:  05/03/20 0447    Narrative:       The following orders were created for panel order CBC & Differential.  Procedure                               Abnormality         Status                     ---------                               -----------         ------                     CBC Auto Differential[[id number]]        Abnormal            Final result                 Please view results for these tests on the individual orders.    CBC Auto Differential [[id number]]  (Abnormal) Collected:  05/03/20 0425    Specimen:  Blood Updated:  05/03/20  0447     WBC 10.10 10*3/mm3      RBC 3.86 10*6/mm3      Hemoglobin 10.9 g/dL      Hematocrit 30.3 %      MCV 78.5 fL      MCH 28.1 pg      MCHC 35.8 g/dL      RDW 16.2 %      RDW-SD 44.6 fl      MPV 7.9 fL      Platelets 217 10*3/mm3      Neutrophil % 90.8 %      Lymphocyte % 4.6 %      Monocyte % 4.5 %      Eosinophil % 0.0 %      Basophil % 0.1 %      Neutrophils, Absolute 9.20 10*3/mm3      Lymphocytes, Absolute 0.50 10*3/mm3      Monocytes, Absolute 0.50 10*3/mm3      Eosinophils, Absolute 0.00 10*3/mm3      Basophils, Absolute 0.00 10*3/mm3      nRBC 0.0 /100 WBC     Comprehensive Metabolic Panel [[id number]]  (Abnormal) Collected:  05/01/20 0604    Specimen:  Blood Updated:  05/01/20 0713     Glucose 130 mg/dL      BUN 15 mg/dL      Creatinine 1.15 mg/dL      Sodium 130 mmol/L      Potassium 3.8 mmol/L      Chloride 93 mmol/L      CO2 23.0 mmol/L      Calcium 9.9 mg/dL      Total Protein 6.5 g/dL      Albumin 3.50 g/dL      ALT (SGPT) 14 U/L      AST (SGOT) 22 U/L      Alkaline Phosphatase 156 U/L      Total Bilirubin 0.4 mg/dL      eGFR Non African Amer 63 mL/min/1.73      Globulin 3.0 gm/dL      A/G Ratio 1.2 g/dL      BUN/Creatinine Ratio 13.0     Anion Gap 14.0 mmol/L     Narrative:       GFR Normal >60  Chronic Kidney Disease <60  Kidney Failure <15      Calcium, Ionized [[id number]]  (Abnormal) Collected:  05/01/20 0604    Specimen:  Blood Updated:  05/01/20 0703     Ionized Calcium 1.36 mmol/L     CBC & Differential [[id number]] Collected:  05/01/20 0604    Specimen:  Blood Updated:  05/01/20 0658    Narrative:       The following orders were created for panel order CBC & Differential.  Procedure                               Abnormality         Status                     ---------                               -----------         ------                     CBC Auto Differential[[id number]]        Abnormal            Final result                 Please view results for these tests on the individual orders.      CBC Auto Differential [[id number]]  (Abnormal) Collected:  05/01/20 0604    Specimen:  Blood Updated:  05/01/20 0658     WBC 9.30 10*3/mm3      RBC 4.43 10*6/mm3      Hemoglobin 12.1 g/dL      Hematocrit 35.5 %      MCV 80.0 fL      MCH 27.3 pg      MCHC 34.1 g/dL      RDW 16.9 %      RDW-SD 47.3 fl      MPV 7.7 fL      Platelets 202 10*3/mm3      Neutrophil % 87.5 %      Lymphocyte % 5.9 %      Monocyte % 6.3 %      Eosinophil % 0.2 %      Basophil % 0.1 %      Neutrophils, Absolute 8.20 10*3/mm3      Lymphocytes, Absolute 0.60 10*3/mm3      Monocytes, Absolute 0.60 10*3/mm3      Eosinophils, Absolute 0.00 10*3/mm3      Basophils, Absolute 0.00 10*3/mm3      nRBC 0.0 /100 WBC     Creatinine, Serum [[id number]]  (Normal) Collected:  04/30/20 0828    Specimen:  Blood Updated:  04/30/20 1054     Creatinine 1.03 mg/dL      eGFR Non African Amer 71 mL/min/1.73     Narrative:       GFR Normal >60  Chronic Kidney Disease <60  Kidney Failure <15      Ferritin [[id number]]  (Normal) Collected:  04/29/20 1645    Specimen:  Blood Updated:  04/29/20 1759     Ferritin 115.70 ng/mL     Narrative:       Results may be falsely decreased if patient taking Biotin.      Iron Profile [[id number]]  (Abnormal) Collected:  04/29/20 1645    Specimen:  Blood Updated:  04/29/20 1756     Iron 44 mcg/dL      Iron Saturation 13 %      Transferrin 235 mg/dL      TIBC 350 mcg/dL     Calcium, Ionized [[id number]]  (Abnormal) Collected:  04/29/20 1645    Specimen:  Blood Updated:  04/29/20 1740     Ionized Calcium 1.57 mmol/L     SARS-CoV-2 PCR ([place] IN-HOUSE PERFORMED), NP SWAB IN TRANSPORT MEDIA - Swab, Nasopharynx [[id number]]  (Normal) Collected:  04/28/20 1923    Specimen:  Swab from Nasopharynx Updated:  04/29/20 0055     COVID19 Not Detected    Respiratory Panel, PCR - Swab, Nasopharynx [[id number]]  (Normal) Collected:  04/28/20 1923    Specimen:  Swab from Nasopharynx Updated:  04/28/20 2114     ADENOVIRUS, PCR Not Detected      Coronavirus 229E Not Detected     Coronavirus HKU1 Not Detected     Coronavirus NL63 Not Detected     Coronavirus OC43 Not Detected     Human Metapneumovirus Not Detected     Human Rhinovirus/Enterovirus Not Detected     Influenza B PCR Not Detected     Parainfluenza Virus 1 Not Detected     Parainfluenza Virus 2 Not Detected     Parainfluenza Virus 3 Not Detected     Parainfluenza Virus 4 Not Detected     Bordetella pertussis pcr Not Detected     Influenza A H1 2009 PCR Not Detected     Chlamydophila pneumoniae PCR Not Detected     Mycoplasma pneumo by PCR Not Detected     Influenza A PCR Not Detected     Influenza A H3 Not Detected     Influenza A H1 Not Detected     RSV, PCR Not Detected    Narrative:       The coronavirus on the RVP is NOT COVID-19 and is NOT indicative of infection with COVID-19.     Troponin [[id number]]  (Normal) Collected:  04/28/20 1923    Specimen:  Blood Updated:  04/28/20 1953     Troponin T <0.010 ng/mL     Narrative:       Troponin T Reference Range:  <= 0.03 ng/mL-   Negative for AMI  >0.03 ng/mL-     Abnormal for myocardial necrosis.  Clinicians would have to utilize clinical acumen, EKG, Troponin and serial changes to determine if it is an Acute Myocardial Infarction or myocardial injury due to an underlying chronic condition.       Results may be falsely decreased if patient taking Biotin.      D-dimer, Quantitative [[id number]]  (Abnormal) Collected:  04/28/20 1923    Specimen:  Blood Updated:  04/28/20 1953     D-Dimer, Quantitative 3.77 MCGFEU/mL     Narrative:       Reference Range  --------------------------------------------------------------------     < 0.50   Negative Predictive Value  0.50-0.59   Indeterminate    >= 0.60   Probable VTE             A very low percentage of patients with DVT may yield D-Dimer results   below the cut-off of 0.50 MCGFEU/mL.  This is known to be more   prevalent in patients with distal DVT.             Results of this test should always be  interpreted in conjunction with   the patient's medical history, clinical presentation and other   findings.  Clinical diagnosis should not be based on the result of   INNOVANCE D-Dimer alone.           Imaging Results (Last 72 Hours)     Procedure Component Value Units Date/Time    XR Chest 1 View [[id number]] Collected:  05/04/20 1017     Updated:  05/04/20 1023    Narrative:       DATE OF EXAM:  5/4/2020 10:12 AM     PROCEDURE:  XR CHEST 1 VW-     INDICATIONS:  Shortness of breath, cough, past tobacco abuse, right lung cancer with  metastatic disease to the bones.      COMPARISON:  Chest x-ray and CT of the chest performed on 04/28/2020.     TECHNIQUE:   Single radiographic view of the chest was obtained.     FINDINGS:  There is worsening consolidation in the right lung presumably  representing known malignancy versus pneumonia versus hemorrhage. There  is no pneumothorax or pneumomediastinum. The left lung is clear. There  are no pleural effusions.  The patient has had previous cardiac surgery.  The heart size is normal. The pulmonary vascular markings are normal.  There is an osteolytic lesion chest appreciated in the left glenoid  consistent with the osseous metastatic disease.       Impression:          1. Worsening consolidation in the right lung presumably representing  known malignancy versus pneumonia versus hemorrhage.  2. No pneumothorax or pneumomediastinum.  3. Osseous metastatic disease best appreciated in left glenoid. There  are additional lesions which were seen on the previous CT of the chest.     Electronically Signed By-Kedar Cruz On:5/4/2020 10:21 AM  This report was finalized on [id number] by  Kedar Cruz, .    [place] < 1 Hour [[id number]] Resulted:  05/04/20 0931     Updated:  05/04/20 0932                  ECG/EMG Results (last 24 hours)     ** No results found for the last 24 hours. **          Pulmonary Functions Testing Results:    No results found for: FEV1, FVC, FQW3JBT, TLC, DLCO        "    Condition on Discharge:  The same     Vital Signs  /59 (BP Location: Right arm, Patient Position: Lying)   Pulse 50   Temp 97.4 °F (36.3 °C) (Oral)   Resp 16   Ht 175.3 cm (69\")   Wt 88 kg (194 lb 0.1 oz)   SpO2 95%   BMI 28.65 kg/m²       Physical Exam:     General Appearance:    Alert, cooperative, in no acute distress   Head:    Normocephalic, without obvious abnormality, atraumatic   Eyes:            Lids and lashes normal, conjunctivae and sclerae normal, no   icterus, no pallor, corneas clear, PERRLA   Ears:    Ears appear intact with no abnormalities noted   Throat:   No oral lesions, no thrush, oral mucosa moist   Neck:   No adenopathy, supple, trachea midline, no thyromegaly, no   carotid bruit, no JVD   Lungs:     Clear to auscultation,respirations regular, even and                  unlabored    Heart:    Regular rhythm and normal rate, normal S1 and S2, no            murmur, no gallop, no rub, no click   Chest Wall:    No abnormalities observed   Abdomen:     Normal bowel sounds, no masses, no organomegaly, soft        non-tender, non-distended, no guarding, no rebound                tenderness   Extremities:   Moves all extremities well, no edema, no cyanosis, no             redness   Pulses:   Pulses palpable and equal bilaterally   Skin:   No bleeding, bruising or rash   Lymph nodes:   No palpable adenopathy   Neurologic:   Cranial nerves 2 - 12 grossly intact, sensation intact, DTR       present and equal bilaterally       Discharge Disposition  Home or Self Care    Discharge Medications     Discharge Medications      New Medications      Instructions Start Date   ALPRAZolam 1 MG tablet  Commonly known as:  XANAX   1 mg, Oral, Nightly      dexamethasone 4 MG tablet  Commonly known as:  DECADRON   4 mg, Oral, Every 12 Hours Scheduled      ferrous sulfate 324 (65 Fe) MG tablet delayed-release EC tablet   324 mg, Oral, 2 Times Daily With Meals      HYDROcodone-acetaminophen 7.5-325 MG " per tablet  Commonly known as:  NORCO   1 tablet, Oral, Every 6 Hours PRN         Changes to Medications      Instructions Start Date   nitroglycerin 0.4 MG SL tablet  Commonly known as:  NITROSTAT  What changed:  See the new instructions.   DISSOLVE ONE TABLET UNDER TONGUE AS NEEDED FOR CHEST PAIN         Continue These Medications      Instructions Start Date   amLODIPine 10 MG tablet  Commonly known as:  NORVASC   10 mg, Oral, Daily      atenolol 25 MG tablet  Commonly known as:  TENORMIN   25 mg, Oral, 2 Times Daily      [facility] Vitamin D3 25 MCG (1000 UT) chewable tablet  Generic drug:  Cholecalciferol   1 tablet, Oral, Every 24 Hours, Do not take day of surgery      finasteride 5 MG tablet  Commonly known as:  PROSCAR   5 mg, Oral, Nightly      glimepiride 2 MG tablet  Commonly known as:  AMARYL   4 mg, Oral, 2 Times Daily, Do not take day of surgery      isosorbide mononitrate 60 MG 24 hr tablet  Commonly known as:  IMDUR   60 mg, Oral, Daily      losartan 100 MG tablet  Commonly known as:  COZAAR   100 mg, Oral, Daily, Do not take day of surgery      metFORMIN  MG 24 hr tablet  Commonly known as:  GLUCOPHAGE-XR   2,000 mg, Oral, Daily Before Supper      niacin 500 MG CR tablet  Commonly known as:  NIASPAN   1 tablet, Oral, Daily, Do not take day of surgery      rosuvastatin 10 MG tablet  Commonly known as:  CRESTOR   1 tablet, Oral, Daily         Stop These Medications    Adult Aspirin EC Low Strength 81 MG EC tablet  Generic drug:  aspirin     clopidogrel 75 MG tablet  Commonly known as:  PLAVIX     [facility] Fish Oil 1200 MG capsule     raNITIdine 75 MG tablet  Commonly known as:  ZANTAC            Discharge Diet:     Activity at Discharge:     Follow-up Appointments  Future Appointments   Date Time Provider Department Center   7/6/2020 12:20 PM Pj Hough MD [facility]         Test Results Pending at Discharge       Sola Guerrero MD  05/06/20  18:12    Time: Discharge 30min

## 2020-05-07 NOTE — ANESTHESIA PROCEDURE NOTES
Peripheral IV    Patient location during procedure: OR  Start time: 5/7/2020 11:55 AM  End time: 5/7/2020 11:55 AM  Line placed for Difficult Access, Fluids/Medication Admin and Sedation.  Performed By   Anesthesiologist: Kaiser Mejía MD  Preanesthetic Checklist  Completed: patient identified, site marked, surgical consent, pre-op evaluation, timeout performed, IV checked, risks and benefits discussed and monitors and equipment checked  Peripheral IV Prep   Patient position: supine   Prep: ChloraPrep  Patient monitoring: heart rate, cardiac monitor and continuous pulse ox  Peripheral IV Procedure   Laterality:left  Location:  Forearm  Catheter size: 20 G         Post Assessment   Dressing Type: tape and transparent.    IV Dressing/Site: clean, dry and intact  Additional Notes  RN PLACEMENT

## 2020-05-07 NOTE — ANESTHESIA PREPROCEDURE EVALUATION
Anesthesia Evaluation     Patient summary reviewed and Nursing notes reviewed   NPO Solid Status: > 8 hours  NPO Liquid Status: > 8 hours           Airway   Mallampati: I  TM distance: >3 FB  Neck ROM: full  No difficulty expected  Dental - normal exam     Pulmonary - normal exam   (+) lung cancer,   Cardiovascular - normal exam    (+) hypertension, past MI  >12 months, CAD, CABG, hyperlipidemia,       Neuro/Psych- negative ROS  GI/Hepatic/Renal/Endo    (+)   diabetes mellitus,     Musculoskeletal (-) negative ROS    Abdominal  - normal exam    Bowel sounds: normal.   Substance History - negative use     OB/GYN negative ob/gyn ROS         Other                        Anesthesia Plan    ASA 3     general     intravenous induction     Anesthetic plan, all risks, benefits, and alternatives have been provided, discussed and informed consent has been obtained with: patient.

## 2020-05-07 NOTE — ANESTHESIA PROCEDURE NOTES
Arterial Line      Patient location during procedure: OR  Start time: 5/7/2020 11:52 AM  Stop Time:5/7/2020 11:52 AM       Line placed for respiratory failure, hemodynamic monitoring, ABGs/Labs/ISTAT and MD/Surgeon request.  Performed By   Anesthesiologist: Kaiser Mejía MD  Preanesthetic Checklist  Completed: patient identified, site marked, surgical consent, pre-op evaluation, timeout performed, IV checked, risks and benefits discussed and monitors and equipment checked  Arterial Line Prep   Sterile Tech: cap, gloves, sterile barriers and mask  Prep: ChloraPrep  Patient monitoring: EKG, continuous pulse oximetry and blood pressure monitoring  Arterial Line Procedure   Laterality:right  Location:  radial artery  Catheter size: 20 G   Guidance: landmark technique and palpation technique  Number of attempts: 1  Successful placement: yes  Post Assessment   Dressing Type: wrist guard applied, secured with tape and occlusive dressing applied.   Complications no  Circ/Move/Sens Assessment: normal and unchanged.   Patient Tolerance: patient tolerated the procedure well with no apparent complications  Additional Notes  X1 GOOD TRISHA

## 2020-05-07 NOTE — ANESTHESIA PROCEDURE NOTES
Airway  Urgency: elective    Date/Time: 5/7/2020 11:50 AM  End Time:5/7/2020 11:50 AM  Airway not difficult    General Information and Staff    Patient location during procedure: OR  Anesthesiologist: Kaiser Mejía MD    Indications and Patient Condition  Indications for airway management: airway protection    Preoxygenated: yes  MILS maintained throughout  Mask difficulty assessment: 1 - vent by mask    Final Airway Details  Final airway type: endotracheal airway      Successful airway: ETT  Cuffed: yes   Successful intubation technique: direct laryngoscopy  Endotracheal tube insertion site: oral  Blade: Glidescope  Blade size: 4  ETT size (mm): 7.0  Cormack-Lehane Classification: grade I - full view of glottis  Placement verified by: chest auscultation and capnometry   Measured from: gums  ETT/EBT to gums (cm): 21  Number of attempts at approach: 1  Assessment: lips, teeth, and gum same as pre-op and atraumatic intubation    Additional Comments  ATRAUMATIC. X1 WITH EASE.  NECK MIDLINE NEUTRAL. CUFF TO MINIMUM OCCLUSIVE CUFF PRESSURE. GAUZE BITE BLOCK

## 2020-05-08 NOTE — PROGRESS NOTES
Discharge Planning Assessment  [facility]     Patient Name: Clemente Salinas  MRN: [id number]  Today's Date: 5/8/2020    Admit Date: 5/7/2020    Discharge Needs Assessment     Row Name 05/08/20 1355       Living Environment    Lives With  spouse    Name(s) of Who Lives With Patient  wife    Current Living Arrangements  home/apartment/condo    Primary Care Provided by  self    Provides Primary Care For  no one    Family Caregiver if Needed  none    Quality of Family Relationships  helpful;supportive    Able to Return to Prior Arrangements  yes       Resource/Environmental Concerns    Resource/Environmental Concerns  none    Transportation Concerns  car, none       Transition Planning    Patient/Family Anticipates Transition to  home with family    Patient/Family Anticipated Services at Transition      Transportation Anticipated  family or friend will provide       Discharge Needs Assessment    Readmission Within the Last 30 Days  planned readmission    Concerns to be Addressed  denies needs/concerns at this time    Equipment Currently Used at Home  glucometer    Anticipated Changes Related to Illness  none    Equipment Needed After Discharge  none    Discharge Coordination/Progress  Anticipate routine discharge home with spouse. Pending PT/OT evals.         Discharge Plan     Row Name 05/08/20 2336       Plan    Plan  Anticipate routine discharge home with spouse. Pending PT/OT evals.    Patient/Family in Agreement with Plan  yes    Plan Comments  Due to covid 19 pandemic,  working off site.  called patient's room and his cell phone, patient did not answer.  called patient's wife to get information. Patient has been independent with only a glucometer. Denied any difficulty obtaining food or meds. Barriers to discharge: waiting for MD to round.         Destination      Coordination has not been started for this encounter.             Demographic Summary     Row Name 05/08/20  1354       General Information    Admission Type  inpatient    Arrived From  home    Required Notices Provided  Important Message from Medicare    Referral Source  other (see comments)    Reason for Consult  discharge planning    Preferred Language  English     Used During This Interaction  no       Contact Information    Permission Granted to Share Info With          Functional Status     Row Name 05/08/20 1355       Functional Status    Usual Activity Tolerance  moderate    Current Activity Tolerance  moderate       Functional Status, IADL    Medications  independent    Meal Preparation  independent    Housekeeping  independent    Laundry  independent    Shopping  independent       Mental Status    General Appearance WDL  WDL       Mental Status Summary    Recent Changes in Mental Status/Cognitive Functioning  no changes                    Anna [place] [address]   [phone]  office  [phone]  fax  [email or web address]

## 2020-05-08 NOTE — PLAN OF CARE
Pts pain under control this shift with only PO medication. Able to urinate on own. Plan of care ongoing. Will continue to monitor.

## 2020-05-08 NOTE — NURSING NOTE
The patient was feeling urgency to void but unable.  I was not able to bladder scan him due to the equipment having a low battery power but his abdomen was distended and he felt the urge to void and was uncomfortable.  Straight cath was inserted using sterile procedures and though I did meet some resistance, I was able to get a spontaneous return of clear yellow urine.  The amount was 900cc.  The patient tolerated well and stated he no longer felt the urgency.

## 2020-05-08 NOTE — PLAN OF CARE
Problem: Patient Care Overview  Goal: Plan of Care Review  Outcome: Ongoing (interventions implemented as appropriate)  Flowsheets (Taken 5/8/2020 1935)  Plan of Care Reviewed With: patient  Outcome Summary: 75 yo male seen POD1 following posterior cervical fusion, vertebrectomy at C5, anterior cage placed C3-T3, and TLIF at T12-L1. Pt recently dx w/ lung cancer and had developed multiple mets to spine. Pt is far below his baseline level and has had extensive spine sx. He would be high risk for falls at home. He is currently able to amb 30 ft, but he became dizzy today and had to return to his chair after amb only 15 ft. Pt will require IP rehab at d/c. Will follow for PT while in hospital.  PPE worn: gloves, mask, face shield.

## 2020-05-08 NOTE — OP NOTE
POSTERIOR CERVICAL FUSION, CERVICAL DISCECTOMY ANTERIOR WITH FUSION, VERTEBROPLASTY  Procedure Report    Patient Name:  Clemente Salinas  YOB: 1945    Date of Surgery:  5/7/2020     Indications: Mr. Salinas is a 74-year-old gentleman who has known metastatic adenocarcinoma.  He has destruction of the C5 vertebral segment and the C5 facet as well as the C6-7 facets.  He is here today for a C5 vertebrectomy and placement of anterior strut cage with planned posterior C3-T3 stabilization.  He also has known metastatic lesions at T12 and L1 with plans for vertebroplasty for cement augmentation of the affected levels.    Pre-op Diagnosis:   Metastatic cancer (CMS/HCC) [C79.9]       Post-Op Diagnosis Codes:     * Metastatic cancer (CMS/HCC) [C79.9]    Procedure/CPT® Codes:  Stage I  1.  Anterior cervical C5 vertebrectomy and arthrodesis (Camber Ambassador plate 30 mm)  2.  Use of PEEK interbody cage (Camber Verta 12 x 14.5 x 23 mm cage)  3.  Use of allograft (Bioventus Osteoamp)    Stage 2  1.  Posterior cervical arthrodesis C3-T3 (Medtronic Infinity system)  2.  Use of intraoperative CT and frameless stereotactic navigation  3.  Use of allograft (Medtronic Magnifuse)  4.   Percutaneous vertebroplasty T12, L1 (Reinbeck cement)    Procedure(s):  POSTERIOR CERVICAL FUSION, C5 VERTEBRECTOMY C5 AND PLACEMENT OF ANTERIOR CAGE C3-T3 POSTERIOR FUSION T12,  CERVICAL DISCECTOMY ANTERIOR WITH FUSION  VERTEBROPLASTY LUMBAR 1    Staff:  Surgeon(s):  Pawel Angel MD         Anesthesia: Choice    Estimated Blood Loss: 1450 mL    Implants:    Implant Name Type Inv. Item Serial No.  Lot No. LRB No. Used   SEALANT HEMOS FLOSEAL MATRX WO/SYR 10ML - [id number] Implant SEALANT HEMOS FLOSEAL MATRX WO/SYR 10ML  [employer] [id number] N/A 1   CMT VERTAPLEX HV W/AUTOPLEX DEL .5DOSE - [id number] Implant CMT VERTAPLEX HV W/AUTOPLEX DEL .5DOSE  ELIEL KELSI [id number] N/A 1   ALLOGRFT SPNG STRIP OSTEOAMP COMPRESSIBLE LG  [id number] - [id number] Implant ALLOGRFT SPNG STRIP OSTEOAMP COMPRESSIBLE LG [id number]  [employer] [id number] N/A 1   RADHA CRV CD HORIZON SOLERA CP4 TI 5.5CM 120MM - [id number] Implant RADHA CRV CD HORIZON SOLERA CP4 TI 5.5CM 120MM  [employer] . N/A 1   one level plate     . N/A 1   SCRW VA CERV AMBASSADOR 4X14MM - [id number] Implant SCRW VA CERV AMBASSADOR 4X14MM  [employer] . N/A 2   SCRW FA CERV AMBASSADOR 4X14MM - [id number] Implant SCRW FA CERV AMBASSADOR 4X14MM  [employer] . N/A 1   fixed screw     . N/A 1   MULTI  AXIAL SCREW    [employer] [id number] N/A 2   SCRW MAS INFINITY 5.5X30MM - [id number] Implant SCRW MAS INFINITY 5.5X30MM  [employer] [id number] N/A 2   SCRW MAS INFINITY 5.5X35MM - [id number] Implant SCRW MAS INFINITY 5.5X35MM  [employer] [id number] N/A 3   GRFT BONE MAGNIFUSE PC 1X10CM - [id number] - [id number] Implant GRFT BONE MAGNIFUSE PC 1X10CM [id number] [employer] [id number] N/A 1   SCRW ST INFINITY STD - [id number] Implant SCRW ST INFINITY STD  [employer] . N/A 12   RADHA TI STD 3.0I207VA - [id number] Implant RADHA TI STD [id number]  [employer] . N/A 2   SCRW INFINITY MAS 3.5X14MM - [id number] Implant SCRW INFINITY MAS 3.5X14MM  [employer]  N/A 5       Specimen:          Specimens     ID Source Type Tests Collected By Collected At Frozen?      A Spine, Cervical Bone · TISSUE PATHOLOGY EXAM   Pawel Angel MD 5/7/20 1400 No     Description: cervical five vertbral body               Findings: Dictated    Complications: None    Description of Procedure:   Stage I  The patient was placed under general endotracheal anesthesia, intubated, and placed on the operating table in the supine position.  The neuro monitoring technician then placed electrodes for free running EMG, MEP and SSEP.  A transverse shoulder roll was then placed in the head was hyperextended and rested on a surgical pillow.   All pressure points were adequately padded and inspected  and the arms were tucked.  The neck was prepped and draped in a sterile fashion.  An incision was made in the right lower portion of the neck with the midportion of the incision being at the medial border of the sternocleidomastoid muscle.  Hemostasis was achieved with bipolar cautery and using pickups and Metzenbaum scissors the subcutaneous tissue was dissected off of the platysma.  At this time the platysma was divided longitudinally and dissection was carried down through the middle cervical fascia, between the tracheoesophageal complex and carotid complex.  Dissection was carried above the omohyoid muscle which had been mobilized.  Dissection was carried down to the anterior aspect of the cervical spine.  A needle was placed in the first available disc space.  Intraoperative x-ray demonstrated this to be C5-6 segement.  Using Bovie cautery the soft tissue was dissected off of the C4-6 segments and the longus colli muscles were elevated off both sides of the anterior cervical spine using Bovie cautery.  Trimline retractors were then placed and used to hold the esophagus, trachea, and recurrent laryngeal bundle medially and the carotid bundle and its contents laterally.  The disc space at the C4-5 and C5-6 level was incised with an 11 blade scalpel and disc material was removed with the 0 and 3-0 up-biting and straight curettes. The pituitary was also used to remove disc material.  The bone was quite occupied by tumor.  This was gently removed with pituitary rongeur.  High-speed drill was used to drill away the remaining bone all the way down to the posterior wall which is left intact secondary to the fear of spreading any cancer into the epidural space.  Once adequate decompression been performed spacers were placed to demonstrate an adequate decompression performed for placement of the cage.  Calipers were placed and was felt a 23 mm cage was appropriate size.  The cage was then packed with the allograft and  tapped in position after gentle distraction was placed on the neck by anesthesia.  Once the cage was in good position final positioning was then performed.  At this time x-ray was brought into the field and the cage was demonstrated to be at the appropriate level and good position.  The plate was then placed and secured with 14 mm screws.  The wound was copiously irrigated with irrigation and inspection for meticulous hemostasis.  The platysma muscle was then reapproximated with 3-0 Vicryl.  And the skin was closed in a subcuticular running fashion with 4-0 Vicryl.  Skin affix was then placed over the incision.    Stage II  The patient was then placed in three-point head fixation and placed atop the radiolucent operating table in neutral position.  The head was affixed to the Crowder head bustamante in a neutral position.  The arms were padded and tucked.  All pressure points have been adequately padded and inspected.  The posterior cervical and suboccipital region were shaved, prepped, draped in a sterile fashion.  A timeout was conducted in the room to confirm the appropriate patient, site of surgery, and procedure.  Using palpated anatomic landmarks the planned incision site was then delineated on the posterior cervical spine.  The skin was locally anesthetized 1% lidocaine with epinephrine.  A 10 blade scalpel was then used to make a midline incision.  Hemostasis was achieved with Bovie cauterization.  Dissection then carried out to the spinous processes and subperiosteal dissection was carried out to the medial aspect of the facets.  Using digital palpation of the C2 spinous process the exposure was carried to what was felt to be the C3 spinous process.  The navigation platform was attached to the C3 spinous process.  Intraoperative CT scan was brought into the field and imaging demonstrated the platform to be on the C4 spinous process.  Intraoperative CT scans were obtained and then transferred to the navigation  system.  Once the images were transferred the CT scan was then removed.  Confirmation of accuracy was then determined.  At this time continue dissection was performed to expose the inferior aspect of the C3 facet all the way down to the T3 transverse process.  High-speed drill was then used to generate  holes for lateral mass screws at C3, C4, right C5.  The left C5 lateral mass and the C6 and C7 on the left were not instrumented secondary to tumor involvement..  Pedicle screw trajectories were generated at C7 on the right, T1 to T3 bilaterally.  The holes were all then pretapped with a 3.5 mm tap.  At the C3-4 segment 3.5 x 14 mm screws were placed.  At the C5 on the right 3.5 x 24 mm screws were placed.  At the C7 on the right 5.5 x 25 mm screws were placed and 5.5 x 25 mm screws were placed at T1, 5.5  X 30 mm screws at T2, and 5.5 x 35 mm screws at T3.  Intraoperative CT scan was then brought back into the field demonstrating all the hardware to be in good location.  At this time a getachew was cut and contoured and placed in the tulip heads and locking caps applied.  Locking caps were applied and final tightening was then performed.  Once the locking caps were tightened attention was turned to the laminectomy.  The wound was copiously irrigated with irrigation.  The bone was then decorticated and the allograft was placed over the decorticated bone.  A 10 [place] Hemovac drain was then placed and tunneled in a sterile fashion.  During the course the case multiple motor evoked potentials were performed demonstrating no change from baseline.  This time the wounds copiously irrigated bacitracin irrigation.  The bone was then decorticated.  Over the decorticated bone infuse was laid and then a combination of the allograft and autograft were placed over the infuse.  A 10 [place] Hemovac drain was then placed and tunneled in a sterile fashion.  The fascia was then reapproximated with 0 Vicryl in the subcutaneous tissue  was reapproximated with 2-0 Vicryl.  The skin was then closed with staples.  The drain was attached to suction and dressing was applied to the wound.  The patient was then placed back to the supine position and removed in three-point head fixation.  The patient was then extubated and taken to recover without incident.     The back was then prepped and draped in a sterile fashion. At this time fluoroscopy was brought into the field and in the AP plane using the 12th ribs and identifying landmarks the T12 level was identified.  At this time the plan entry points were delineated using the distal tip of the cement to the needle.  A stab incision was then made with 11 blade scalpel on the right side.  The needle was advanced to the superior lateral portion of the pedicles at L1 and T12.  Under direct AP fluoroscopy the needles were advanced to the midportion of the pedicle.  The needles were advanced the same way as described above.  At this time the fluoroscopy was brought into the lateral plane.  The needles were seen just past the pedicle junction with the vertebral body.  The needles were then advanced under direct lateral fluoroscopy to the midportion vertebral body.  The inner stylets were then removed.  The cement was then mixed on the back table as prescribed by the .  At this time cement was introduced into the vertebral bodies on the right side initially.  Approximately 4 cement was introduced at T12 and 4 cc at L1.  There is slight extravasation of the cement posteriorly at T12 and at L1.  At this time it was felt that further attempt to place the cement would risk extravasation of the cement.  Was felt this procedure was then complete  Fluoroscopy was then removed and the needles were then withdrawn.  Dressings were applied to the wound.  The patient was then placed back to the supine position and awakened from anesthesia.  The patient was then extubated and taken to recovery without  incident.    Biplane fluoroscopy was used throughout the operative intervention to localize the location of the compression fracture and to guide the instrumentation.          Pawel Angel MD     Date: 5/8/2020  Time: 11:06

## 2020-05-08 NOTE — OUTREACH NOTE
Medical Week 1 Survey      Responses   [facility] patient discharged from?  [facility]   COVID-19 Test Status  Negative   Does the patient have one of the following disease processes/diagnoses(primary or secondary)?  Other   Is there a successful TCM telephone encounter documented?  No   Week 1 attempt successful?  No   Revoke  Readmitted          Nika Evangelista RN

## 2020-05-08 NOTE — THERAPY EVALUATION
Patient Name: Clemente Salinas  : 1945    MRN: [id number]                              Today's Date: 2020       Admit Date: 2020    Visit Dx:     ICD-10-CM ICD-9-CM   1. 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)   • Neuropathy   • Atheroscler of native artery of both legs with intermit claudication (CMS/HCC)   • Malignant neoplasm of right lung (CMS/HCC)   • Metastatic cancer (CMS/HCC)     Past Medical History:   Diagnosis Date   • BPH (benign prostatic hyperplasia)    • CAD (coronary artery disease)    • Cancer (CMS/HCC)     lung ca   • DM type 2 (diabetes mellitus, type 2) (CMS/HCC)    • HLD (hyperlipidemia)    • HTN (hypertension)    • Myocardial infarction (CMS/HCC)    • Peripheral neuropathy    • Vitamin D deficiency 2010     Past Surgical History:   Procedure Laterality Date   • BRONCHOSCOPY N/A 2020    Procedure: BRONCHOSCOPY WITH BRONCHOALVEOLAR LAVAGE AND NAVIGATION WITH FINE NEEDLE ASPIRATION, BRUSHING, AND BIOPSY X1 AREA AND ULTRASOUND WITH FINE NEEDLE ASPIRATION;  Surgeon: Jeb Velasquez MD;  Location: [facility] ENDOSCOPY;  Service: Pulmonary;  Laterality: N/A;  RLL mass   • CHOLECYSTECTOMY     • CORONARY ANGIOPLASTY WITH STENT PLACEMENT  2013    LAD   • CORONARY ARTERY BYPASS GRAFT  2001   • CORONARY STENT PLACEMENT  2013   • FEMORAL ARTERY STENT  2019     General Information     Row Name 20 1549          PT Evaluation Time/Intention    Document Type  evaluation  -CM     Mode of Treatment  physical therapy  -CM      Row Name 05/08/20 1549          General Information    Patient Profile Reviewed?  yes laying in bed; wearing hard cervical collar  -CM     Prior Level of Function  independent:;all household mobility;gait;community mobility only very short community distances  -CM     Existing Precautions/Restrictions  spinal;brace worn when out of bed  -CM     Barriers to Rehab  none identified  -CM     Row Name 05/08/20 1549          Relationship/Environment    Lives With  spouse  -CM     Row Name 05/08/20 1549          Resource/Environmental Concerns    Current Living Arrangements  home/apartment/condo  -CM     Row Name 05/08/20 1549          Cognitive Assessment/Intervention- PT/OT    Orientation Status (Cognition)  oriented x 4  -CM     Row Name 05/08/20 1549          Safety Issues, Functional Mobility    Impairments Affecting Function (Mobility)  balance;endurance/activity tolerance;strength;pain;sensation/sensory awareness  -CM       User Key  (r) = Recorded By, (t) = Taken By, (c) = Cosigned By    Initials Name Provider Type    CM Deidre Moscoso, PT Physical Therapist        Mobility     Row Name 05/08/20 1550          Bed Mobility Assessment/Treatment    Bed Mobility Assessment/Treatment  bed mobility (all) activities;rolling right;supine-sit;sit-supine;rolling left  -CM     Rolling Right [place] (Bed Mobility)  moderate assist (50% patient effort)  -CM     Supine-Sit [place] (Bed Mobility)  moderate assist (50% patient effort)  -CM     Assistive Device (Bed Mobility)  bed rails  -CM     Row Name 05/08/20 1550          Sit-Stand Transfer    Sit-Stand [place] (Transfers)  minimum assist (75% patient effort);1 person assist from elevated bed surface  -CM     Assistive Device (Sit-Stand Transfers)  walker, front-wheeled  -CM     Row Name 05/08/20 3011          Gait/Stairs Assessment/Training    Gait/Stairs Assessment/Training  gait/ambulation independence;gait/ambulation assistive device  -CM     [place]  Level (Gait)  minimum assist (75% patient effort)  -CM     Assistive Device (Gait)  other (see comments) hand held assist  -CM     Distance in Feet (Gait)  25 ft; became dizzy after amb ~12 ft, turned to amb back to bed w/ one hand held.   -CM     Pattern (Gait)  step-through  -CM     Deviations/Abnormal Patterns (Gait)  bilateral deviations;stride length decreased lateral sway noted  -CM     Bilateral Gait Deviations  heel strike decreased  -CM       User Key  (r) = Recorded By, (t) = Taken By, (c) = Cosigned By    Initials Name Provider Type    CM Deidre Moscoso, PT Physical Therapist        Obj/Interventions     Row Name 05/08/20 1552          General ROM    GENERAL ROM COMMENTS  UEs wfl, shoulder flexion to 50% due to pain; BLEs wfl  -CM     [facility] Name 05/08/20 [facility]          MMT (Manual Muscle Testing)    General MMT Comments   wnl; BLEs 4-/5  -CM     [facility] Name 05/08/20 [facility]          Static Sitting Balance    Level of [place] (Unsupported Sitting, Static Balance)  supervision  -CM     Sitting Position (Unsupported Sitting, Static Balance)  sitting on edge of bed  -[facility] Name 05/08/20 [facility]          Dynamic Sitting Balance    Level of [place], Reaches Outside Midline (Sitting, Dynamic Balance)  contact guard assist  -CM     Sitting Position, Reaches Outside Midline (Sitting, Dynamic Balance)  sitting on edge of bed  -[facility] Name 05/08/20 [facility]          Static Standing Balance    Level of [place] (Supported Standing, Static Balance)  minimal assist, 75% patient effort  -CM     Row Name 05/08/20 [facility]          Dynamic Standing Balance    Level of [place], Reaches Outside Midline (Standing, Dynamic Balance)  moderate assist, 50 to 74% patient effort;minimal assist, 75% patient effort  -CM     Row Name 05/08/20 [facility]          Sensory Assessment/Intervention    Sensory General Assessment  -- hx of peripheral neuropathy  -CM       User Key  (r) = Recorded By, (t) = Taken By, (c) = Cosigned  By    Initials Name Provider Type    Deidre Arnold, PT Physical Therapist        Goals/Plan     Row Name 05/08/20 1552          Bed Mobility Goal 1 (PT)    Activity/Assistive Device (Bed Mobility Goal 1, PT)  bed mobility activities, all  -CM     [place] Level/Cues Needed (Bed Mobility Goal 1, PT)  conditional independence log rolling  -CM     Time Frame (Bed Mobility Goal 1, PT)  2 weeks  -CM     Row Name 05/08/20 1558          Transfer Goal 1 (PT)    Activity/Assistive Device (Transfer Goal 1, PT)  transfers, all;walker, rolling  -CM     [place] Level/Cues Needed (Transfer Goal 1, PT)  supervision required  -CM     Time Frame (Transfer Goal 1, PT)  2 weeks  -CM     Row Name 05/08/20 1553          Gait Training Goal 1 (PT)    Activity/Assistive Device (Gait Training Goal 1, PT)  gait (walking locomotion);assistive device use;walker, rolling  -CM     [place] Level (Gait Training Goal 1, PT)  supervision required  -CM     Distance (Gait Goal 1, PT)  75 ft   -CM     Time Frame (Gait Training Goal 1, PT)  2 weeks  -CM       User Key  (r) = Recorded By, (t) = Taken By, (c) = Cosigned By    Initials Name Provider Type    Deidre Arnold, PT Physical Therapist        Clinical Impression     Row Name 05/08/20 5163          Pain Assessment    Additional Documentation  Pain Scale: Numbers Pre/Post-Treatment (Group)  -CM     Row Name 05/08/20 0236          Pain Scale: Numbers Pre/Post-Treatment    Pain Scale: Numbers, Pretreatment  2/10  -CM     Pain Scale: Numbers, Post-Treatment  2/10  -CM     Pain Location  back  -CM     Pre/Post Treatment Pain Comment  c/o neck and back pain  -CM     Pain Intervention(s)  Medication (See MAR);Emotional support;Repositioned  -CM     Row Name 05/08/20 9044          Plan of Care Review    Plan of Care Reviewed With  patient  -CM     Outcome Summary  75 yo male seen POD1 following posterior cervical fusion, vertebrectomy at C5, anterior cage placed C3-T3, and TLIF  at T12-L1. Pt recently dx w/ lung cancer and had developed multiple mets to spine. Pt is far below his baseline level and has had extensive spine sx. He would be high risk for falls at home. He is currently able to amb 30 ft, but he became dizzy today and had to return to his chair after amb only 15 ft. Pt will require IP rehab at d/c. Will follow for PT while in hospital.   -CM     Row Name 05/08/20 1557          Physical Therapy Clinical Impression    Criteria for Skilled Interventions Met (PT Clinical Impression)  yes;treatment indicated  -CM     Rehab Potential (PT Clinical Summary)  good, to achieve stated therapy goals  -CM     Predicted Duration of Therapy (PT)  until d/c  -CM     Row Name 05/08/20 1554          Vital Signs    Intra Systolic BP Rehab  123  -CM     Intra Treatment Diastolic BP  61  -CM     Post Systolic BP Rehab  137  -CM     Post Treatment Diastolic BP  67  -CM     O2 Delivery Pre Treatment  room air  -CM     Row Name 05/08/20 1554          Positioning and Restraints    Pre-Treatment Position  in bed  -CM     Post Treatment Position  chair  -CM     In Chair  notified nsg;sitting;call light within reach;encouraged to call for assist;legs elevated wearing hard cervical collar  -CM       User Key  (r) = Recorded By, (t) = Taken By, (c) = Cosigned By    Initials Name Provider Type    Deidre Arnold, PT Physical Therapist        Outcome Measures     Row Name 05/08/20 1600          How much help from another person do you currently need...    Turning from your back to your side while in flat bed without using bedrails?  2  -CM     Moving from lying on back to sitting on the side of a flat bed without bedrails?  2  -CM     Moving to and from a bed to a chair (including a wheelchair)?  3  -CM     Standing up from a chair using your arms (e.g., wheelchair, bedside chair)?  3  -CM     Climbing 3-5 steps with a railing?  2  -CM     To walk in hospital room?  3  -CM     AM-PAC 6 Clicks Score (PT)   15  -CM     Row Name 05/08/20 1600          Functional Assessment    Outcome Measure Options  AM-PAC 6 Clicks Basic Mobility (PT)  -CM       User Key  (r) = Recorded By, (t) = Taken By, (c) = Cosigned By    Initials Name Provider Type    Deidre Arnold, PT Physical Therapist        Physical Therapy Education                 Title: PT OT SLP Therapies (Done)     Topic: Physical Therapy (Done)     Point: Mobility training (Done)     Description:   Instruct learner(s) on safety and technique for assisting patient out of bed, chair or wheelchair.  Instruct in the proper use of assistive devices, such as walker, crutches, cane or brace.              Patient Friendly Description:   It's important to get you on your feet again, but we need to do so in a way that is safe for you. Falling has serious consequences, and your personal safety is the most important thing of all.        When it's time to get out of bed, one of us or a family member will sit next to you on the bed to give you support.     If your doctor or nurse tells you to use a walker, crutches, a cane, or a brace, be sure you use it every time you get out of bed, even if you think you don't need it.    Learning Progress Summary           Patient Acceptance, E,TB, VU,DU by CM at 5/8/2020 1601                   Point: Precautions (Done)     Description:   Instruct learner(s) on prescribed precautions during mobility and gait tasks              Learning Progress Summary           Patient Acceptance, E,TB, VU,DU by CM at 5/8/2020 1601                               User Key     Initials Effective Dates Name Provider Type Discipline     03/01/19 -  Deidre Moscoso, PT Physical Therapist PT              PT Recommendation and Plan  Planned Therapy Interventions (PT Eval): balance training, bed mobility training, gait training, patient/family education, motor coordination training, neuromuscular re-education, postural re-education, transfer training,  strengthening  Outcome Summary/Treatment Plan (PT)  Anticipated Discharge Disposition (PT): inpatient rehabilitation facility  Plan of Care Reviewed With: patient  Outcome Summary: 75 yo male seen POD1 following posterior cervical fusion, vertebrectomy at C5, anterior cage placed C3-T3, and TLIF at T12-L1. Pt recently dx w/ lung cancer and had developed multiple mets to spine. Pt is far below his baseline level and has had extensive spine sx. He would be high risk for falls at home. He is currently able to amb 30 ft, but he became dizzy today and had to return to his chair after amb only 15 ft. Pt will require IP rehab at d/c. Will follow for PT while in hospital.      Time Calculation:   PT Charges     Row Name 05/08/20 1602             Time Calculation    Start Time  1349  -CM      Stop Time  1425  -CM      Time Calculation (min)  36 min  -CM      PT Received On  05/08/20  -CM      PT - Next Appointment  05/09/20  -CM      PT Goal Re-Cert Due Date  05/22/20  -CM         Time Calculation- PT    Total Timed Code Minutes- PT  10 minute(s)  -CM        User Key  (r) = Recorded By, (t) = Taken By, (c) = Cosigned By    Initials Name Provider Type    Deidre Arnold, PT Physical Therapist        Therapy Charges for Today     Code Description Service Date Service Provider Modifiers Qty    47925791042 HC PT EVAL MOD COMPLEXITY 3 5/8/2020 Deidre Moscoso, PT GP 1    25240577978 HC GAIT TRAINING EA 15 MIN 5/8/2020 Deidre Moscoso, PT GP 1          PT G-Codes  Outcome Measure Options: AM-PAC 6 Clicks Basic Mobility (PT)  AM-PAC 6 Clicks Score (PT): 15    Deidre Moscoso PT  5/8/2020

## 2020-05-08 NOTE — PROGRESS NOTES
LOS: 1 day   Patient Care Team:  Sola Guerrero MD as PCP - General  Jeremi Blandon MD as PCP - Claims Attributed  Pj Hough MD as Consulting Physician (Cardiology)  Fili Cordova MD as Consulting Physician (Hematology and Oncology)    Chief Complaint: Neck pain    Subjective     Interval History:     POD #1 C5 vertebrectomy and placement of anterior strut cage with planned posterior C3-T3 stabilization.  T12 and L1  vertebroplasty for cement augmentation   Patient states he is having some incisional neck pain, patient moving all extremities with no complaints of numbness or tingling or weakness.  Hemovac drain intact output 175 bloody  Hemoglobin 9.7  Patient Power catheter removed in PACU.  Patient had urgency to void but could not and was straight cathed last night due to inability to void.  900 cc of urine was noted.  Patient is currently able to void with no issues noted.    Objective     Vital Signs  Temp:  [97.2 °F (36.2 °C)-98.8 °F (37.1 °C)] 98.5 °F (36.9 °C)  Heart Rate:  [70-93] 70  Resp:  [13-20] 20  BP: (114-190)/(57-88) 114/70  Arterial Line BP: (150-174)/(37-47) 171/39    Physical Exam:   Alert and oriented by 3  Speech is intact and coherent articulate with good content and production  Cranial nerves III through XII are grossly intact with pupils symmetric and reactive and no gaze paresis or nystagmus  Sensation is intact to soft touch and pinprick  in both upper and lower extremities  Motor strength is 5/5 in both upper and lower extremities with no focal motor deficits  Gait not assessed  Incision is clean dry and intact with surgical dressing present small amount of drainage noted on dressing.  No swelling noted  Hemovac drain intact  No lower leg swelling noted    Results Review:     I reviewed the patient's new clinical results.      hemoglobin 9.7    Dr. Angel has personally reviewed films.  Patient's postop films demonstrate stable cervical hardware with expected  changes in both neck and lumbar spine.    Medication Review: Reviewed    Assessment/Plan       Metastatic cancer (CMS/HCC)    POD #1 patient stable  PT/OT to evaluate  Increase ambulation as tolerated  Cervical brace on patient at all times when he is up past 45 degrees for more than 15 minutes.  Keep drain 1 more day      Marylou Voss, RON  05/08/20  14:31

## 2020-05-09 NOTE — THERAPY TREATMENT NOTE
Patient Name: Clemente Salinas  : 1945    MRN: [id number]                              Today's Date: 2020       Admit Date: 2020    Visit Dx:     ICD-10-CM ICD-9-CM   1. 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)   • Neuropathy   • Atheroscler of native artery of both legs with intermit claudication (CMS/HCC)   • Malignant neoplasm of right lung (CMS/HCC)   • Metastatic cancer (CMS/HCC)     Past Medical History:   Diagnosis Date   • BPH (benign prostatic hyperplasia)    • CAD (coronary artery disease)    • Cancer (CMS/HCC)     lung ca   • DM type 2 (diabetes mellitus, type 2) (CMS/HCC)    • HLD (hyperlipidemia)    • HTN (hypertension)    • Myocardial infarction (CMS/HCC)    • Peripheral neuropathy    • Vitamin D deficiency 2010     Past Surgical History:   Procedure Laterality Date   • BRONCHOSCOPY N/A 2020    Procedure: BRONCHOSCOPY WITH BRONCHOALVEOLAR LAVAGE AND NAVIGATION WITH FINE NEEDLE ASPIRATION, BRUSHING, AND BIOPSY X1 AREA AND ULTRASOUND WITH FINE NEEDLE ASPIRATION;  Surgeon: Jeb Velasquez MD;  Location: [facility] ENDOSCOPY;  Service: Pulmonary;  Laterality: N/A;  RLL mass   • CHOLECYSTECTOMY     • CORONARY ANGIOPLASTY WITH STENT PLACEMENT  2013    LAD   • CORONARY ARTERY BYPASS GRAFT  2001   • CORONARY STENT PLACEMENT  2013   • FEMORAL ARTERY STENT  2019     General Information     Row Name 20 0915          PT Evaluation Time/Intention    Document Type  therapy note (daily note)  -SC     Mode of Treatment  individual  therapy;physical therapy  -[facility] Name 05/09/20 0915          General Information    Existing Precautions/Restrictions  spinal;brace worn when out of bed cervical collar when out of the bed  -[facility] Name 05/09/20 0915          Cognitive Assessment/Intervention- PT/OT    Orientation Status (Cognition)  oriented x 4  -[facility] Name 05/09/20 0915          Safety Issues, Functional Mobility    Impairments Affecting Function (Mobility)  balance;endurance/activity tolerance;strength;pain;sensation/sensory awareness  -SC     Comment, Safety Issues/Impairments (Mobility)  pt got dizzy 2 times during tx session. b/p 121/72 mm/hg after report of dizziness  -SC       User Key  (r) = Recorded By, (t) = Taken By, (c) = Cosigned By    Initials Name Provider Type    SC Caren Caldera PTA Physical Therapy Assistant        Mobility     Row Name 05/09/20 0920          Bed Mobility Assessment/Treatment    Bed Mobility Assessment/Treatment  bed mobility (all) activities;rolling right;supine-sit  -SC     Rolling Right [place] (Bed Mobility)  minimum assist (75% patient effort)  -SC     Supine-Sit [place] (Bed Mobility)  minimum assist (75% patient effort)  -SC     Comment (Bed Mobility)  log roll.  assisted pt in  donning cervical collar while in supine  -[facility] Name 05/09/20 0920          Transfer Assessment/Treatment    Comment (Transfers)  sit to stand from eob 2 times.  pt   had to sit down after first attempt after reported dizziness.  b/p stable  -SC     Row Name 05/09/20 0920          Sit-Stand Transfer    Sit-Stand [place] (Transfers)  contact guard cues for hand placement.  bed not elevated  -[facility] Name 05/09/20 0920          Gait/Stairs Assessment/Training    Gait/Stairs Assessment/Training  gait/ambulation independence;gait/ambulation assistive device  -SC     Clio Level (Gait)  contact guard  -SC     Assistive Device (Gait)  walker, front-wheeled  -SC     Distance in Feet (Gait)   38'.    -SC     Pattern (Gait)  step-through  -SC     Deviations/Abnormal Patterns (Gait)  jesus manuel decreased  -SC     Comment (Gait/Stairs)  pt could of amb further but reported some dizziness  -SC       User Key  (r) = Recorded By, (t) = Taken By, (c) = Cosigned By    Initials Name Provider Type    Caren Peterson, MAUREEN Physical Therapy Assistant        Obj/Interventions     Row Name 05/09/20 0927          Static Sitting Balance    Level of [place] (Unsupported Sitting, Static Balance)  supervision  -SC     Sitting Position (Unsupported Sitting, Static Balance)  sitting on edge of bed  -SC     Row Name 05/09/20 0927          Dynamic Sitting Balance    Level of [place], Reaches Outside Midline (Sitting, Dynamic Balance)  supervision  -SC     Sitting Position, Reaches Outside Midline (Sitting, Dynamic Balance)  sitting on edge of bed  -SC     Row Name 05/09/20 0927          Static Standing Balance    Level of [place] (Supported Standing, Static Balance)  contact guard assist  -SC     Assistive Device Utilized (Supported Standing, Static Balance)  walker, rolling  -SC     Row Name 05/09/20 0927          Dynamic Standing Balance    Level of [place], Reaches Outside Midline (Standing, Dynamic Balance)  contact guard assist  -SC     Assistive Device Utilized (Supported Standing, Dynamic Balance)  walker, rolling  -SC       User Key  (r) = Recorded By, (t) = Taken By, (c) = Cosigned By    Initials Name Provider Type    Caren Peterson PTA Physical Therapy Assistant        Goals/Plan    No documentation.       Clinical Impression     Row Name 05/09/20 0928          Pain Assessment    Additional Documentation  Pain Scale: FACES Pre/Post-Treatment (Group)  -SC     Row Name 05/09/20 0928          Pain Scale: Numbers Pre/Post-Treatment    Pre/Post Treatment Pain Comment  neck  -SC     Row Name 05/09/20 0928          Pain Scale: FACES Pre/Post-Treatment    Pain: FACES Scale, Pretreatment  2-->hurts  little bit  -SC     Pain: FACES Scale, Post-Treatment  4-->hurts little more  -SC     Row Name 05/09/20 0928          Physical Therapy Clinical Impression    Rehab Potential (PT Clinical Summary)  good, to achieve stated therapy goals  -SC     Row Name 05/09/20 0928          Vital Signs    Intra Systolic BP Rehab  121  -SC     Intra Treatment Diastolic BP  72  -SC     O2 Delivery Pre Treatment  room air  -SC     O2 Delivery Intra Treatment  room air  -SC     O2 Delivery Post Treatment  room air  -[facility] Name 05/09/20 0928          Positioning and Restraints    Pre-Treatment Position  in bed  -SC     Post Treatment Position  chair  -SC     In Chair  notified nsg;sitting;call light within reach wearing cervical collar  -SC       User Key  (r) = Recorded By, (t) = Taken By, (c) = Cosigned By    Initials Name Provider Type    Caren Peterson PTA Physical Therapy Assistant        Outcome Measures     Row Name 05/09/20 0932          How much help from another person do you currently need...    Turning from your back to your side while in flat bed without using bedrails?  3  -SC     Moving from lying on back to sitting on the side of a flat bed without bedrails?  3  -SC     Moving to and from a bed to a chair (including a wheelchair)?  3  -SC     Standing up from a chair using your arms (e.g., wheelchair, bedside chair)?  3  -SC     Climbing 3-5 steps with a railing?  2  -SC     To walk in hospital room?  3  -SC     AM-PAC 6 Clicks Score (PT)  17  -[facility] Name 05/09/20 0932          Functional Assessment    Outcome Measure Options  AM-PAC 6 Clicks Basic Mobility (PT)  -SC       User Key  (r) = Recorded By, (t) = Taken By, (c) = Cosigned By    Initials Name Provider Type    Caren Peterson PTA Physical Therapy Assistant        Physical Therapy Education                 Title: PT OT SLP Therapies (Done)     Topic: Physical Therapy (Done)     Point: Mobility training (Done)     Description:   Instruct  learner(s) on safety and technique for assisting patient out of bed, chair or wheelchair.  Instruct in the proper use of assistive devices, such as walker, crutches, cane or brace.              Patient Friendly Description:   It's important to get you on your feet again, but we need to do so in a way that is safe for you. Falling has serious consequences, and your personal safety is the most important thing of all.        When it's time to get out of bed, one of us or a family member will sit next to you on the bed to give you support.     If your doctor or nurse tells you to use a walker, crutches, a cane, or a brace, be sure you use it every time you get out of bed, even if you think you don't need it.    Learning Progress Summary           Patient Acceptance, E, DU,NR by SC at 5/9/2020 0933    Acceptance, E,TB, VU,DU by  at 5/8/2020 1601                   Point: Precautions (Done)     Description:   Instruct learner(s) on prescribed precautions during mobility and gait tasks              Learning Progress Summary           Patient Acceptance, E, DU,NR by SC at 5/9/2020 0933    Acceptance, E,TB, VU,DU by  at 5/8/2020 1601                               User Key     Initials Effective Dates Name Provider Type Discipline     03/01/19 -  Deidre Moscoso, PT Physical Therapist PT    SC 03/01/19 -  Caren Caldera PTA Physical Therapy Assistant PT              PT Recommendation and Plan     Outcome Summary/Treatment Plan (PT)  Anticipated Discharge Disposition (PT): inpatient rehabilitation facility  Plan of Care Reviewed With: patient  Progress: improving  Outcome Summary: pt reported overall less assist to transfer to eob and progress to gait training.  pt still had reports of dizziness but this was intermittent and b/p was stable. pt would benefit from IP Rehab at d/c to allow pt to recover his mobility.  PPE used:   mask with faceshield and gloves     Time Calculation:   PT Charges     Row Name 05/09/20  0937             Time Calculation    Start Time  0835  -SC      Stop Time  0900  -SC      Time Calculation (min)  25 min  -SC      PT Received On  05/09/20  -SC      PT - Next Appointment  05/10/20  -SC         Time Calculation- PT    Total Timed Code Minutes- PT  25 minute(s)  -SC         Timed Charges    19280 - Gait Training Minutes   10  -SC      59892 - PT Therapeutic Activity Minutes  15  -SC        User Key  (r) = Recorded By, (t) = Taken By, (c) = Cosigned By    Initials Name Provider Type    SC Caren Caldera PTA Physical Therapy Assistant        Therapy Charges for Today     Code Description Service Date Service Provider Modifiers Qty    70102965168 HC GAIT TRAINING EA 15 MIN 5/9/2020 Caren Caldera, MAUREEN GP 1    83548963852 HC PT THERAPEUTIC ACT EA 15 MIN 5/9/2020 Caren Caldera, MAUREEN GP 1          PT G-Codes  Outcome Measure Options: AM-PAC 6 Clicks Basic Mobility (PT)  AM-PAC 6 Clicks Score (PT): 17    Caren Caldera PTA  5/9/2020

## 2020-05-09 NOTE — PROGRESS NOTES
"Covering for Dr. Angel  Postoperative day 2  Status post C5 corpectomy with a cage and plate, C3-T3 instrumented fusion and a T12 and L1 vertebroplasty  Vitals:    05/08/20 1923 05/09/20 0429 05/09/20 0857 05/09/20 1149   BP: 145/66 150/77 146/75 146/75   BP Location: Right arm Right arm Right arm    Patient Position: Lying Lying Lying Lying   Pulse: 60 81 82 75   Resp: 14 18 15 16   Temp: 98.9 °F (37.2 °C) 98.7 °F (37.1 °C) 99.1 °F (37.3 °C) 98.7 °F (37.1 °C)   TempSrc:  Oral Oral Oral   SpO2: 98% 95% 93% 95%   Weight:    84.5 kg (186 lb 4.6 oz)   Height:    175.3 cm (69.02\")     Lab Results   Component Value Date    GLUCOSE 254 (H) 05/08/2020    BUN 18 05/08/2020    CREATININE 1.08 05/08/2020    EGFRIFNONA 67 05/08/2020    BCR 16.7 05/08/2020    K 4.4 05/08/2020    CO2 22.0 05/08/2020    CALCIUM 8.1 (L) 05/08/2020    ALBUMIN 3.50 05/03/2020    AST 17 05/03/2020    ALT 15 05/03/2020     Hemoglobin   Date Value Ref Range Status   05/08/2020 9.7 (L) 13.0 - 17.7 g/dL Final     Hematocrit   Date Value Ref Range Status   05/08/2020 27.6 (L) 37.5 - 51.0 % Final     Dressing intact  Drain still in place  Final pathology pending but 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

## 2020-05-09 NOTE — PLAN OF CARE
Problem: Patient Care Overview  Goal: Plan of Care Review  Outcome: Ongoing (interventions implemented as appropriate)  Flowsheets (Taken 5/9/2020 8989)  Progress: improving  Plan of Care Reviewed With: patient  Outcome Summary: pt reported overall less assist to transfer to eob and progress to gait training.  pt still had reports of dizziness but this was intermittent and b/p was stable. pt would benefit from IP Rehab at d/c to allow pt to recover his mobility.  PPE used:   mask with faceshield and gloves

## 2020-05-09 NOTE — PROGRESS NOTES
Discharge Planning Assessment  [facility]     Patient Name: Clemente Salinas  MRN: [id number]  Today's Date: 5/9/2020    Admit Date: 5/7/2020      Discharge Plan     Row Name 05/09/20 1833       Plan    Plan  DC plan: PT recommending IP rehab. Pending choices from wife    Plan Comments  Attempted to call pt in room regarding IP rehab choices and d/c planning due to Covid precautions. Pt did not answer room phone. Called pt's wife, Irene to discuss IP rehab facilities. Pt's wife is driving and will call CM later to discuss IP rehab choices.           Josefa Faye RN

## 2020-05-09 NOTE — PLAN OF CARE
Pt has slept most of the day. Has only required one dose of pain med. Tolerating his surgical incisions well. No distress.  Problem: Patient Care Overview  Goal: Plan of Care Review  Outcome: Ongoing (interventions implemented as appropriate)

## 2020-05-10 NOTE — PLAN OF CARE
Pt has been very sleepy today. Alert and oriented times three. Lying supine asleep at this time.  Problem: Patient Care Overview  Goal: Plan of Care Review  Outcome: Ongoing (interventions implemented as appropriate)

## 2020-05-10 NOTE — PROGRESS NOTES
Discharge Planning Assessment  [facility]     Patient Name: Clemente Salinas  MRN: [id number]  Today's Date: 5/10/2020    Admit Date: 5/7/2020      Discharge Plan     Row Name 05/10/20 1649       Plan    Plan DC plan: PT suggesting IP rehab. Referral sent via Epic and notified liaison of referral. Pending acceptance. No PASRR or precert needed.     Provided Post Acute Provider List?  Yes    Post Acute Provider List  Inpatient Rehab    Delivered To  Support Person    Support Person  wife- Irene    Method of Delivery  Telephone    Plan Comments  PT suggesting IP rehab.  Discussed IP rehab facilities with pt'd spouse over phone on 5/9/20 and discussed ratings at medicare.gov. Called wife again on 5/10 to discuss rehab choices. Pt's wife's #1 choice is [facility]. Called pt's room 5/10/20 to discuss d/c planning. Pt agreeable with wife's choice for IP rehab at [facility]. Referral sent via Epic and notified liaison of referral. Pending acceptance. No PASRR or precert needed.         Destination      Service Provider Request Status Selected Services Address Phone Number Fax Number    [facility] Pending - Request Sent N/A [address] [place] [address] [phone]          Josefa Faye RN

## 2020-05-10 NOTE — PLAN OF CARE
Problem: Patient Care Overview  Goal: Plan of Care Review  Outcome: Ongoing (interventions implemented as appropriate)  Flowsheets (Taken 5/10/2020 1120)  Plan of Care Reviewed With: patient  Outcome Summary: Pt very drowsy this AM, states all he feels like doing is sleeping, has not been OOB.  Donned cervical collar in supine, required min/CGA to roll and transfer to EOB.  Pt c/o dizziness in sitting BP 96/57, sat x 5 minutes.  Stood x 2, c/o worsening dizziness BP 84/53.  Pt only able to ambulate short distance in room.  Decline from previous sessions, RN aware.  Recommend IP rehab at d/c.  Pt up in chair at end of session.  PPE worn:  gloves and mask with face shield

## 2020-05-10 NOTE — CONSULTS
Hematology/Oncology Inpatient Consultation    Patient name: Clemente Salinas  : 1945  MRN: [id number]  Referring Provider: Dr. Stanley  Reason for Consultation:     Chief complaint: Metastatic adenocarcinoma of the RLL lung    History of present illness:    74 y.o. male admitted 20 for posterior cervical fusion, anterior cervical discectomy with fusion, and vertebroplasty of T12 and L1 secondary to metastatic lung cancer. Postoperatively hemoglobin dropped to 9.7 on 2020.    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  He was initially seen during hospitalization 20 to 20 with bilateral shoulder and back pain. It seemed to be worse on the left side.  He claimed to have been feeling weak for about a month prior to admission.  He had a cough without fever in 2019 and his wife had a cough also at that time which had resolved. He had a course of steroids as outpatient for the shoulder pain without relief. He had lost 20 pounds of weight since 2019.  He had shortness of air on exertion but no hemoptysis. He had chronic swelling of the right foot after coronary artery bypass surgery. During the admission, he was noted to have hypercalcemia with calcium 12.6 (8.6-10.5), alkaline phosphatase 187 (), D-dimer 3.77 (0.17-0.59), WBC 8.6 hemoglobin 12.7 MCV 81.8 RDW 16.7 platelet count 261,000, RVP negative.  SARS-CoV-2 was not detected.  Chest x-ray revealed a right perihilar and basilar patchy density and CT chest revealed dense masslike consolidation in the apical segment of the right lower lung with surrounding opacities.  Nodular opacities in both lungs were present.  Diffuse lytic osseous metastatic disease was present with large destructive lesion in the right C 7 posterior elements with large lytic lesion of the T8, T12  and L1 vertebral bodies.  Possible metastatic soft tissue nodule in the lateral left chest wall/axilla was present. CT abdomen and pelvis showed 2 small liver lesions measuring up to 14 mm, indeterminate for cyst versus metastatic disease.  CT cervical, thoracic, and lumbar spine showed extensive lytic lesions.  There was a pathologic fracture at C5 with involvement of the left transverse process and transverse foramen.  There was a lytic lesion at C6-7. There were areas of cortical breakthrough and nondisplaced pathologic fracture in the lumbar spine.  There was no evidence of retropulsion or soft tissue tumoral extension into the osseous spinal canal.  CT head was negative for metastatic disease. MRI brain showed 11 x 7 mm subcortical left frontal lobe lesion felt possibly metastatic disease with differential also including subacute infarct.  No hemorrhage or edema was present and patient was asymptomatic.  Plan was to follow the brain lesion with repeat imaging as outpatient. MRI C and T-spine showed extensive osseous metastatic disease most pronounced on the right at C6-7.  There is no abnormal enhancement of epidural space or cervical spinal cord and no significant central canal narrowing.  There was limited evaluation of the neural foramina with suspected narrowing at sites of metastatic destruction including C4-C5, right C5-C6, and right C6-C7.  The largest lesion in the T-spine was at T5 with mild expansion which had peripheral enhancement and measured 3.6 x 2.3 x 2.4 cm.  There was no significant central canal narrowing or neural foraminal narrowing.  There were enhancing lesions involving many of the ribs. He received Zometa on 4/29/20 with normalization of calcium level. He was started on dexamethasone due to spinal metastases. He underwent navigational bronchoscopy 5/4/20 by Dr. Velasquez with right lower lobe mass biopsy positive for invasive moderately differentiated adenocarcinoma.  FNA of RLL lung mass  revealed non-small cell lung carcinoma consistent with adenocarcinoma. FNA from R12 lobar lymph node revealed rare atypical cells consistent with non-small cell carcinoma.  Bronchoscopy smears showed few atypical cell groups suspicious for carcinoma in a background of abundant ciliated bronchial epithelial cells and brushing was positive for non-small cell carcinoma.  After discussion with neurosurgery (Dr. Angel) and radiation therapy (Dr. Curtis), the patient was discharged with cervical collar with plan to admit for C5 stabilization followed by local radiation and/or systemic therapy.   · Anemia - On admission 4/28/20 hemoglobin was 12.7, MCV 81.8, and RDW 16.7. On 4/29/2020 iron was 44 (), TIBC 350 (298-436), iron saturation 13 (20-50), and ferritin 115.7 (). Hemoglobin dropped to 10.9 on 5/3/20. He was started on ferrous sulfate 325mg by mouth daily. Stool heme was pending at time of discharge.     PCP: Sola Guerrero MD    History:  Past Medical History:   Diagnosis Date   • BPH (benign prostatic hyperplasia)    • CAD (coronary artery disease)    • Cancer (CMS/HCC)     lung ca   • DM type 2 (diabetes mellitus, type 2) (CMS/HCC) 2001   • HLD (hyperlipidemia)    • HTN (hypertension) 2003   • Myocardial infarction (CMS/HCC)    • Peripheral neuropathy    • Vitamin D deficiency 12/2010   , Past Surgical History:   Procedure Laterality Date   • BRONCHOSCOPY N/A 5/4/2020    Procedure: BRONCHOSCOPY WITH BRONCHOALVEOLAR LAVAGE AND NAVIGATION WITH FINE NEEDLE ASPIRATION, BRUSHING, AND BIOPSY X1 AREA AND ULTRASOUND WITH FINE NEEDLE ASPIRATION;  Surgeon: Jeb Velasquez MD;  Location: [facility] ENDOSCOPY;  Service: Pulmonary;  Laterality: N/A;  RLL mass   • CHOLECYSTECTOMY     • CORONARY ANGIOPLASTY WITH STENT PLACEMENT  09/09/2013    LAD   • CORONARY ARTERY BYPASS GRAFT  07/2001   • CORONARY STENT PLACEMENT  06/18/2013   • FEMORAL ARTERY STENT  08/2019   , Family History   Problem Relation Age of Onset   • Heart  disease Father    • Heart attack Father 62   • Heart attack Paternal Grandmother 72   • Heart disease Paternal Grandmother    , Social History     Tobacco Use   • Smoking status: Former Smoker     Last attempt to quit: 1981     Years since quittin.3   • Smokeless tobacco: Never Used   Substance Use Topics   • Alcohol use: No   • Drug use: No   , Medications Prior to Admission   Medication Sig Dispense Refill Last Dose   • amLODIPine (NORVASC) 10 MG tablet Take 10 mg by mouth Daily.  3 2020 at Unknown time   • aspirin 81 MG EC tablet Take 81 mg by mouth Daily.   2020   • atenolol (TENORMIN) 25 MG tablet Take 25 mg by mouth 2 (Two) Times a Day.   2020 at Unknown time   • Cholecalciferol ([employer] VITAMIN D3) 1000 units chewable tablet Chew 1 tablet Daily. Do not take day of surgery   Past Week at Unknown time   • clopidogrel (PLAVIX) 75 MG tablet Take 75 mg by mouth Daily. Pt has not taken since last 2020 at 0800   • finasteride (PROSCAR) 5 MG tablet Take 5 mg by mouth Every Night.   2020 at Unknown time   • glimepiride (AMARYL) 2 MG tablet Take 4 mg by mouth 2 (Two) Times a Day. Do not take day of surgery   2020 at Unknown time   • isosorbide mononitrate (IMDUR) 60 MG 24 hr tablet Take 60 mg by mouth Daily.   2020 at Unknown time   • losartan (COZAAR) 100 MG tablet Take 100 mg by mouth Daily. Do not take day of surgery   2020 at Unknown time   • metFORMIN ER (GLUCOPHAGE-XR) 500 MG 24 hr tablet Take 2,000 mg by mouth Daily Before Supper.   Past Month at 0800   • Omega-3 Fatty Acids (FISH OIL) 1000 MG capsule capsule Take 1,000 mg by mouth Daily With Breakfast.   2020 at 0800   • ALPRAZolam (XANAX) 1 MG tablet Take 1 tablet by mouth Every Night for 8 doses. 30 tablet 0    • dexamethasone (DECADRON) 4 MG tablet Take 1 tablet by mouth Every 12 (Twelve) Hours. 60 tablet 0    • ferrous sulfate 324 (65 Fe) MG tablet delayed-release EC tablet Take 1 tablet by mouth 2 (Two)  Times a Day With Meals. 30 tablet 0 Unknown at Unknown time   • niacin (NIASPAN) 500 MG CR tablet Take 1 tablet by mouth Daily. Do not take day of surgery   Unknown at Unknown time   • nitroglycerin (NITROSTAT) 0.4 MG SL tablet DISSOLVE ONE TABLET UNDER TONGUE AS NEEDED FOR CHEST PAIN (Patient taking differently: Place 0.4 mg under the tongue Every 5 (Five) Minutes As Needed.) 25 tablet 0 Taking   • rosuvastatin (CRESTOR) 10 MG tablet Take 1 tablet by mouth Daily.   Unknown at Unknown time   , Scheduled Meds:    amLODIPine 10 mg Oral Daily   atenolol 25 mg Oral BID   ferrous sulfate 324 mg Oral BID With Meals   finasteride 5 mg Oral Nightly   glipizide 5 mg Oral QAM AC   insulin lispro 0-9 Units Subcutaneous TID AC   isosorbide mononitrate 60 mg Oral Daily   losartan 100 mg Oral Daily   metFORMIN ER 2,000 mg Oral Daily Before Supper   mirtazapine 15 mg Oral Nightly   pantoprazole 40 mg Oral Q AM   pregabalin 100 mg Oral Q12H   rosuvastatin 10 mg Oral Daily   sodium chloride 3 mL Intravenous Q12H   , Continuous Infusions:    sodium chloride 0.45 % with KCl 20 mEq 100 mL/hr Last Rate: Stopped (05/08/20 0157)   , PRN Meds:  dextrose  •  dextrose  •  glucagon (human recombinant)  •  HYDROcodone-acetaminophen  •  HYDROmorphone **AND** naloxone  •  insulin lispro **AND** insulin lispro  •  nitroglycerin  •  ondansetron  •  sodium chloride   Allergies:  Patient has no known allergies.    ROS:  Review of Systems   Constitutional: Negative for activity change, chills, fatigue, fever and unexpected weight change.   HENT: Negative for congestion, dental problem, hearing loss, mouth sores, nosebleeds, sore throat and trouble swallowing.    Eyes: Negative for photophobia and visual disturbance.   Respiratory: Negative for cough, chest tightness and shortness of breath.    Cardiovascular: Negative for chest pain, palpitations and leg swelling.   Gastrointestinal: Negative for abdominal distention, abdominal pain, blood in stool,  "constipation, diarrhea, nausea and vomiting.   Endocrine: Negative for cold intolerance and heat intolerance.   Genitourinary: Negative for decreased urine volume, difficulty urinating, frequency, hematuria and urgency.   Musculoskeletal: Positive for neck pain (discomfort). Negative for arthralgias and gait problem.   Skin: Negative for rash and wound.   Neurological: Positive for dizziness and light-headedness. Negative for tremors, weakness, numbness and headaches. Facial asymmetry: with position changes.   Hematological: Negative for adenopathy. Does not bruise/bleed easily.   Psychiatric/Behavioral: Negative for confusion and hallucinations. The patient is not nervous/anxious.    All other systems reviewed and are negative.       Objective     Vital Signs:   /75 (BP Location: Left arm, Patient Position: Lying)   Pulse 57   Temp 99.3 °F (37.4 °C) (Oral)   Resp 17   Ht 175.3 cm (69.02\")   Wt 84.5 kg (186 lb 4.6 oz)   SpO2 93%   BMI 27.50 kg/m²     Physical Exam:  Physical Exam   Constitutional: He is oriented to person, place, and time. He appears well-developed and well-nourished. No distress.   HENT:   Head: Normocephalic and atraumatic.   Nose: Nose normal.   Mouth/Throat: Oropharynx is clear and moist. No oropharyngeal exudate.   Dentures and dry mouth.   Eyes: Pupils are equal, round, and reactive to light. Conjunctivae and EOM are normal. No scleral icterus.   Neck: Normal range of motion. Neck supple.   3 cm horizontal incision base of anterior neck.  Drain with bloody output from posterior neck.   Cardiovascular: Normal rate, regular rhythm and intact distal pulses.   No murmur heard.  Monitor leads   Pulmonary/Chest: Effort normal and breath sounds normal. No respiratory distress. He has no wheezes. He has no rales.   Abdominal: Soft. Bowel sounds are normal. He exhibits no distension and no mass. There is no tenderness. There is no guarding.   Genitourinary:   Genitourinary Comments: " Deferred    Musculoskeletal: Normal range of motion. He exhibits no edema, tenderness or deformity.   LUE IV.   Lymphadenopathy:     He has no cervical adenopathy.     He has no axillary adenopathy.        Right: No supraclavicular adenopathy present.        Left: No supraclavicular adenopathy present.   Neurological: He is alert and oriented to person, place, and time. Coordination normal.   Hard of hearing   Skin: Skin is warm and dry. No rash noted. He is not diaphoretic. No erythema. There is pallor.   BUE ecchymosis   Psychiatric: He has a normal mood and affect. His behavior is normal. Thought content normal.   Nursing note and vitals reviewed.       Results Review:  Lab Results (last 48 hours)     Procedure Component Value Units Date/Time    POC Glucose Once [[id number]]  (Abnormal) Collected:  05/10/20 0757    Specimen:  Blood Updated:  05/10/20 0758     Glucose 210 mg/dL      Comment: Serial Number: [id number]:  [id number]       POC Glucose Once [[id number]]  (Abnormal) Collected:  05/09/20 2106    Specimen:  Blood Updated:  05/09/20 2107     Glucose 130 mg/dL      Comment: Serial Number: [id number]:  [id number]       POC Glucose Once [[id number]]  (Abnormal) Collected:  05/09/20 1703    Specimen:  Blood Updated:  05/09/20 1704     Glucose 152 mg/dL      Comment: Serial Number: [id number]:  [id number]       POC Glucose Once [[id number]]  (Abnormal) Collected:  05/09/20 1142    Specimen:  Blood Updated:  05/09/20 1143     Glucose 225 mg/dL      Comment: Serial Number: [id number]:  [id number]       POC Glucose Once [[id number]]  (Abnormal) Collected:  05/09/20 0811    Specimen:  Blood Updated:  05/09/20 0812     Glucose 172 mg/dL      Comment: Serial Number: [id number]:  [id number]       POC Glucose Once [[id number]]  (Abnormal) Collected:  05/08/20 1657    Specimen:  Blood Updated:  05/08/20 1658     Glucose 234 mg/dL      Comment: Serial Number: [id number]:  [id number]        Hemoglobin & Hematocrit, Blood [[id number]]  (Abnormal) Collected:  05/08/20 1300    Specimen:  Blood Updated:  05/08/20 1314     Hemoglobin 9.7 g/dL      Hematocrit 27.6 %     POC Glucose Once [[id number]]  (Abnormal) Collected:  05/08/20 1112    Specimen:  Blood Updated:  05/08/20 1114     Glucose 234 mg/dL      Comment: Serial Number: [id number]:  [id number]              Pending Results: bone path, stool heme    Imaging Reviewed:   Xr Spine Cervical 2 Or 3 View    Result Date: 5/8/2020  1.Fusion changes anteriorly and posteriorly with corpectomy of C5 and C6. Expected postoperative appearance with prevertebral soft tissue swelling and posterior soft tissue drain. 2.A lytic lesion in C6 is unchanged consistent with lytic metastatic osseous disease.  Electronically Signed By-Heidi Montero MD On:5/8/2020 9:34 AM This report was finalized on [id number] by  Heidi Montero MD.    Xr Spine Lumbar 2 Or 3 View    Result Date: 5/8/2020  1.IMPRESSION: 2.Kyphoplasty changes T12 and L1. There is some dense methylmethacrylate along the right paravertebral vessel. 3.There are lytic lesions throughout the lumbar spine and pelvis consistent with lytic metastatic disease. 4.There is moderate degenerative disc disease L4-L5 L5-S1 with moderate to severe facet degenerative change at these levels.  Electronically Signed By-Heidi Montero MD On:5/8/2020 9:31 AM This report was finalized on [id number] by  Heidi Montero MD.    Xr Chest 1 View    Result Date: 5/4/2020   1. Worsening consolidation in the right lung presumably representing known malignancy versus pneumonia versus hemorrhage. 2. No pneumothorax or pneumomediastinum. 3. Osseous metastatic disease best appreciated in left glenoid. There are additional lesions which were seen on the previous CT of the chest.  Electronically Signed By-Kedar Cruz On:5/4/2020 10:21 AM This report was finalized on [id number] by  Kedar Cruz, .    Xr Spine Cervical 1 View    Result Date:  5/7/2020  Intraoperative imaging during cervical surgery with a probe anteriorly at C5-6  Electronically Signed By-Hebert Patterson On:5/7/2020 3:16 PM This report was finalized on [id number] by  Hebert Patterson, .    Xr Spine Cervical 1 View    Result Date: 5/7/2020  Intraoperative radiograph during corpectomy and fusion from C4 to at least C6.  Electronically Signed By-Hebert Patterson On:5/7/2020 3:15 PM This report was finalized on [id number] by  Hebert Patterson, .      I have reviewed the patient's labs, imaging, reports, and other clinician documentation.         Assessment/Plan       ASSESSMENT  1. Adenocarcinoma RLL lung with metastases to bone- status post surgical stabilization of cervical spinal mets and vertebroplasty of T12/L1 spine met.  Plan to evaluate for radiation therapy.  Molecular studies on lung biopsy pending to determine systemic therapy options.   2. Anemia - Mild JULIO noted with last admit with stool heme pending at time of discharge and had started ferrous sulfate. On pantoprazole. Reordered stool heme. Will give Venofer x1 with postop Hgb drop.   3. Hypercalcemia of malignancy - Received Zometa with last infusion on 4/29.  Calcium was 8.4 on 5/8.  4. High D-dimer -incidental finding with last admission and felt likely from malignancy as patient otherwise asymptomatic.    5. Orthostatic hypotension - multifactorial postop with pain meds, decreased activity etc.  6. HTNCAD/PVD -Plavix and aspirin held for procedures.  7. DM -  Per primary care.    PLAN  1. Cervical path pending.   2. Molecular studies pending on lung path to guide systemic treatment options.   3. Await radiation evaluation and plan.   4. Stool heme and add Venofer x1.  5. CBC in AM.   6. Resume ASA and Plavix or add prophy Lovenox when ok with neurosurgery.  7. Plan outpatient Xgeva for bone met support.    Proper PPE worn given coronavirus pandemic. Electronically signed by SAMY Mohr, 05/10/20, 10:41 AM.    I have personally  performed a face-to-face diagnostic evaluation via telehealth on this patient.  I have discussed the case with Isidro Perdue NP, have edited/reviewed the note, and agree with the care plan.  Patient is complaining of neck pain post surgery and on examination has a scar anterior base of neck as well as drainage from posterior neck.  His labs are significant for a drop in hemoglobin.  Molecular testing on the lung pathology is pending to guide systemic therapy options.  In the meanwhile will give Venofer for the drop in hemoglobin and follow closely.          I discussed the patients findings and my recommendations with patient.    Thank you for this consult.  We will be happy to follow along in the care of this patient.     Fili Cordova MD  05/10/20  10:02

## 2020-05-10 NOTE — THERAPY TREATMENT NOTE
Patient Name: Clemente Salinas  : 1945    MRN: [id number]                              Today's Date: 5/10/2020       Admit Date: 2020    Visit Dx:     ICD-10-CM ICD-9-CM   1. 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)   • Neuropathy   • Atheroscler of native artery of both legs with intermit claudication (CMS/HCC)   • Malignant neoplasm of right lung (CMS/HCC)   • Metastatic cancer (CMS/HCC)     Past Medical History:   Diagnosis Date   • BPH (benign prostatic hyperplasia)    • CAD (coronary artery disease)    • Cancer (CMS/HCC)     lung ca   • DM type 2 (diabetes mellitus, type 2) (CMS/HCC)    • HLD (hyperlipidemia)    • HTN (hypertension)    • Myocardial infarction (CMS/HCC)    • Peripheral neuropathy    • Vitamin D deficiency 2010     Past Surgical History:   Procedure Laterality Date   • BRONCHOSCOPY N/A 2020    Procedure: BRONCHOSCOPY WITH BRONCHOALVEOLAR LAVAGE AND NAVIGATION WITH FINE NEEDLE ASPIRATION, BRUSHING, AND BIOPSY X1 AREA AND ULTRASOUND WITH FINE NEEDLE ASPIRATION;  Surgeon: Jeb Velasquez MD;  Location: [facility] ENDOSCOPY;  Service: Pulmonary;  Laterality: N/A;  RLL mass   • CHOLECYSTECTOMY     • CORONARY ANGIOPLASTY WITH STENT PLACEMENT  2013    LAD   • CORONARY ARTERY BYPASS GRAFT  2001   • CORONARY STENT PLACEMENT  2013   • FEMORAL ARTERY STENT  2019     General Information     Row Name 05/10/20 1113          PT Evaluation Time/Intention    Document Type  therapy note (daily note)  -     Mode of Treatment  individual  therapy;physical therapy  -[facility] Name 05/10/20 1113          General Information    Existing Precautions/Restrictions  spinal;brace worn when out of bed monitor BP with activity  -[facility] Name 05/10/20 1113          Cognitive Assessment/Intervention- PT/OT    Orientation Status (Cognition)  oriented to;person  -     Cognitive Assessment/Intervention Comment  delayed responses, very drowsy  -[facility] Name 05/10/20 1113          Safety Issues, Functional Mobility    Impairments Affecting Function (Mobility)  balance;cognition;endurance/activity tolerance;pain;strength  -       User Key  (r) = Recorded By, (t) = Taken By, (c) = Cosigned By    Initials Name Provider Type     Danyell Scott, PT Physical Therapist        Mobility     [facility] Name 05/10/20 1117          Bed Mobility Assessment/Treatment    Bed Mobility Assessment/Treatment  rolling left;rolling right;supine-sit  -     Rolling Left [place] (Bed Mobility)  minimum assist (75% patient effort)  -     Rolling Right [place] (Bed Mobility)  minimum assist (75% patient effort)  -     Supine-Sit [place] (Bed Mobility)  minimum assist (75% patient effort);1 person assist  -     Assistive Device (Bed Mobility)  bed rails  -     Comment (Bed Mobility)  logroll, donned cervical collar in supine  -[facility] Name 05/10/20 1117          Transfer Assessment/Treatment    Comment (Transfers)  stood x 2, monitored orthostatic vitals  -[facility] Name 05/10/20 1117          Sit-Stand Transfer    Sit-Stand [place] (Transfers)  minimum assist (75% patient effort);1 person assist cues to push up from bedrail and chair arm  -     Assistive Device (Sit-Stand Transfers)  walker, front-wheeled  -[facility] Name 05/10/20 1117          Gait/Stairs Assessment/Training    [place] Level (Gait)  contact guard;1 person assist had aide present for safety due to pt dizziness and low BP  -     Assistive Device (Gait)  walker, front-wheeled   -     Distance in Feet (Gait)  15'  -     Bilateral Gait Deviations  heel strike decreased  -     Comment (Gait/Stairs)  limited distance due to dizziness  -       User Key  (r) = Recorded By, (t) = Taken By, (c) = Cosigned By    Initials Name Provider Type    Danyell Juarez PT Physical Therapist        Obj/Interventions     Row Name 05/10/20 1120          Static Sitting Balance    Level of [place] (Unsupported Sitting, Static Balance)  supervision  -     Sitting Position (Unsupported Sitting, Static Balance)  sitting on edge of bed  -JH     Row Name 05/10/20 1120          Dynamic Sitting Balance    Level of [place], Reaches Outside Midline (Sitting, Dynamic Balance)  supervision  -     Sitting Position, Reaches Outside Midline (Sitting, Dynamic Balance)  sitting on edge of bed  -JH     Row Name 05/10/20 1120          Static Standing Balance    Level of [place] (Supported Standing, Static Balance)  contact guard assist  -     Assistive Device Utilized (Supported Standing, Static Balance)  walker, rolling  -JH     Row Name 05/10/20 1120          Dynamic Standing Balance    Level of [place], Reaches Outside Midline (Standing, Dynamic Balance)  minimal assist, 75% patient effort  -     Assistive Device Utilized (Supported Standing, Dynamic Balance)  walker, rolling  -       User Key  (r) = Recorded By, (t) = Taken By, (c) = Cosigned By    Initials Name Provider Type    Danyell Juarez PT Physical Therapist        Goals/Plan     Row Name 05/10/20 1124          Bed Mobility Goal 1 (PT)    Progress/Outcomes (Bed Mobility Goal 1, PT)  continuing progress toward goal  -JH     Row Name 05/10/20 1124          Transfer Goal 1 (PT)    Progress/Outcome (Transfer Goal 1, PT)  continuing progress toward goal  -JH     Row Name 05/10/20 1124          Gait Training Goal 1 (PT)    Progress/Outcome (Gait Training Goal 1, PT)  continuing progress toward goal  -       User Key  (r) = Recorded  By, (t) = Taken By, (c) = Cosigned By    Initials Name Provider Type    Danyell Juarez, PT Physical Therapist        Clinical Impression     Row Name 05/10/20 1120          Pain Scale: Numbers Pre/Post-Treatment    Pain Scale: Numbers, Pretreatment  4/10  [facility]     Pain Scale: Numbers, Post-Treatment  4/10  [facility]     Pain Location  neck  -     Row Name 05/10/20 1120          Plan of Care Review    Plan of Care Reviewed With  patient  -     Progress  declining  -     Outcome Summary  Pt very drowsy this AM, states all he feels like doing is sleeping, has not been OOB.  Donned cervical collar in supine, required min/CGA to roll and transfer to EOB.  Pt c/o dizziness in sitting BP 96/57, sat x 5 minutes.  Stood x 2, c/o worsening dizziness BP 84/53.  Pt only able to ambulate short distance in room.  Decline from previous sessions, RN aware.  Recommend IP rehab at d/c.  Pt up in chair at end of session.  PPE worn:  gloves and mask with face shield  -     Row Name 05/10/20 1120          Physical Therapy Clinical Impression    Rehab Potential (PT Clinical Summary)  good, to achieve stated therapy goals  -[facility] Name 05/10/20 1120          Vital Signs    Pre Systolic BP Rehab  96 sitting EOB  -     Pre Treatment Diastolic BP  57  -     Intra Systolic BP Rehab  84 after brief episode of standing  -     Intra Treatment Diastolic BP  53  -JH     O2 Delivery Pre Treatment  room air  -     O2 Delivery Intra Treatment  room air  -     O2 Delivery Post Treatment  room air  [facility] Name 05/10/20 1120          Positioning and Restraints    Pre-Treatment Position  in bed  -     Post Treatment Position  chair  -     In Chair  notified nsg;sitting;call light within reach;exit alarm on  -       User Key  (r) = Recorded By, (t) = Taken By, (c) = Cosigned By    Initials Name Provider Type    Danyell Juarez, PT Physical Therapist        Outcome Measures    No documentation.       Physical Therapy Education                  Title: PT OT SLP Therapies (Done)     Topic: Physical Therapy (Done)     Point: Mobility training (Done)     Description:   Instruct learner(s) on safety and technique for assisting patient out of bed, chair or wheelchair.  Instruct in the proper use of assistive devices, such as walker, crutches, cane or brace.              Patient Friendly Description:   It's important to get you on your feet again, but we need to do so in a way that is safe for you. Falling has serious consequences, and your personal safety is the most important thing of all.        When it's time to get out of bed, one of us or a family member will sit next to you on the bed to give you support.     If your doctor or nurse tells you to use a walker, crutches, a cane, or a brace, be sure you use it every time you get out of bed, even if you think you don't need it.    Learning Progress Summary           Patient Acceptance, E,TB, VU by  at 5/10/2020 1124    Acceptance, E,TB, VU by  at 5/10/2020 1032    Acceptance, E, DU,NR by SC at 5/9/2020 0933    Acceptance, E,TB, VU,DU by  at 5/8/2020 1601                   Point: Precautions (Done)     Description:   Instruct learner(s) on prescribed precautions during mobility and gait tasks              Learning Progress Summary           Patient Acceptance, E,TB, VU by  at 5/10/2020 1124    Acceptance, E,TB, VU by  at 5/10/2020 1032    Acceptance, E, DU,NR by SC at 5/9/2020 0933    Acceptance, E,TB, VU,DU by  at 5/8/2020 1601                               User Key     Initials Effective Dates Name Provider Type Discipline     03/01/19 -  Danyell Scott, PT Physical Therapist PT     03/01/19 -  Deidre Moscoso, PT Physical Therapist PT    SC 03/01/19 -  Caren Caldera PTA Physical Therapy Assistant PT     03/11/20 -  Shanell Sorto, RN Registered Nurse Nurse              PT Recommendation and Plan     Plan of Care Reviewed With: patient  Progress: declining  Outcome  Summary: Pt very drowsy this AM, states all he feels like doing is sleeping, has not been OOB.  Donned cervical collar in supine, required min/CGA to roll and transfer to EOB.  Pt c/o dizziness in sitting BP 96/57, sat x 5 minutes.  Stood x 2, c/o worsening dizziness BP 84/53.  Pt only able to ambulate short distance in room.  Decline from previous sessions, RN aware.  Recommend IP rehab at d/c.  Pt up in chair at end of session.  PPE worn:  gloves and mask with face shield     Time Calculation:   PT Charges     Row Name 05/10/20 1125             Time Calculation    Start Time  0930  -      Stop Time  0950  -      Time Calculation (min)  20 min  -      PT Received On  05/10/20  -         Time Calculation- PT    Total Timed Code Minutes- PT  20 minute(s)  -        User Key  (r) = Recorded By, (t) = Taken By, (c) = Cosigned By    Initials Name Provider Type     Danyell Scott, PT Physical Therapist        Therapy Charges for Today     Code Description Service Date Service Provider Modifiers Qty    [id number]  GAIT TRAINING EA 15 MIN 5/10/2020 Danyell Scott, PT GP 1    [id number]  PT THERAPEUTIC ACT EA 15 MIN 5/10/2020 Danyell Scott, PT GP 1          PT G-Codes  Outcome Measure Options: AM-PAC 6 Clicks Basic Mobility (PT)  AM-PAC 6 Clicks Score (PT): 17    Danyell Scott PT  5/10/2020

## 2020-05-10 NOTE — PROGRESS NOTES
Covering for Dr. Angel  Postoperative day 3  Status post C5 corpectomy with cage and plate, C3-T3 instrumented fusion, T12 and L1 vertebroplasty  Vitals:    05/09/20 1700 05/09/20 2013 05/10/20 0105 05/10/20 0420   BP: 137/67 173/65 156/79 150/75   BP Location: Right arm Left arm Left arm Left arm   Patient Position: Lying Lying Lying Lying   Pulse: 72 75 76 57   Resp: 15 20 16 17   Temp: 98.7 °F (37.1 °C) 98.8 °F (37.1 °C) 97.4 °F (36.3 °C) 99.3 °F (37.4 °C)   TempSrc: Oral Oral Oral Oral   SpO2: 95% 97% 92% 93%   Weight:       Height:         Lab Results   Component Value Date    GLUCOSE 254 (H) 05/08/2020    BUN 18 05/08/2020    CREATININE 1.08 05/08/2020    EGFRIFNONA 67 05/08/2020    BCR 16.7 05/08/2020    K 4.4 05/08/2020    CO2 22.0 05/08/2020    CALCIUM 8.1 (L) 05/08/2020    ALBUMIN 3.50 05/03/2020    AST 17 05/03/2020    ALT 15 05/03/2020     Hemoglobin   Date Value Ref Range Status   05/08/2020 9.7 (L) 13.0 - 17.7 g/dL Final     Hematocrit   Date Value Ref Range Status   05/08/2020 27.6 (L) 37.5 - 51.0 % Final     Drain to be removed today by nursing.  Incision looks good.  Oncology has seen  Reasonably comfortable  Probably need subacute rehab  Working with therapy  Dr. Angel back tomorrow  Stable this weekend

## 2020-05-10 NOTE — DISCHARGE PLACEMENT REQUEST
"Clemente Donis (74 y.o. Male)     Date of Birth Social Security Number Address Home Phone MRN    1945  [address] [place] [address] [phone] [id number]    Taoism Marital Status          Nondenominational        Admission Date Admission Type Admitting Provider Attending Provider Department, Room/Bed    5/7/20 Elective Pawel Angel MD Shanks, Todd Stiles, MD [facility] SURGICAL INPATIENT, 4105/1    Discharge Date Discharge Disposition Discharge Destination                       Attending Provider:  Pawel Angel MD    Allergies:  No Known Allergies    Isolation:  None   Infection:  None   Code Status:  CPR    Ht:  175.3 cm (69.02\")   Wt:  84.5 kg (186 lb 4.6 oz)    Admission Cmt:  None   Principal Problem:  Metastatic cancer (CMS/HCC) [C79.9] More...                 Active Insurance as of 5/7/2020     Primary Coverage     Payor Plan Insurance Group Employer/Plan Group    MEDICARE MEDICARE A & B      Payor Plan Address Payor Plan Phone Number Payor Plan Fax Number Effective Dates    [address] [phone]  11/1/2012 - None Entered    [facility] [id number]       Subscriber Name Subscriber Birth Date Member ID       CLEMENTE DONIS 1945 [id number]           Secondary Coverage     Payor Plan Insurance Group Employer/Plan Group    [facility] SUPP [id number]     Payor Plan Address Payor Plan Phone Number Payor Plan Fax Number Effective Dates    [address]   12/1/2016 - None Entered    [facility] [id number]       Subscriber Name Subscriber Birth Date Member ID       CLEMENTE DONIS 1945 [id number]                 Emergency Contacts      (Rel.) Home Phone Work Phone Mobile Phone    HEBERT DONIS (Spouse) -- -- [phone]               History & Physical      Pawel Angel MD at 05/07/20 1133          H&P reviewed. The patient was examined and there are no changes to the H&P.          Electronically signed by Pawel Angel " MD James at 05/07/20 1134   Source Note             LOS: 5 days   Patient Care Team:  Sola Guerrero MD as PCP - General  Jeremi Blandon MD as PCP - Claims Attributed  Pj Hough MD as Consulting Physician (Cardiology)  Fili Cordova MD as Consulting Physician (Hematology and Oncology)    Chief Complaint:  Neck and left scapular pain    Subjective     Interval History:   NO events in PM. Pt states pain is better.  Still having pain in left scapula and neck aggravated when he turns his head.        Objective     Vital Signs  Temp:  [97.4 °F (36.3 °C)-97.5 °F (36.4 °C)] 97.4 °F (36.3 °C)  Heart Rate:  [52-76] 53  Resp:  [14-20] 16  BP: (129-169)/(46-86) 161/72    Physical Exam:   General Appearance alert, appears stated age and cooperative  Head normocephalic, without obvious abnormality and atraumatic  Back no kyphosis present and no scoliosis present  Extremities moves extremities well, no edema, no cyanosis and no redness  Pulses Pulses palpable and equal bilaterally     Results Review:     I reviewed the patient's new clinical results.  Preliminary pathology adenocarcinoma  Medication Review: reviewed    Assessment/Plan       Malignant neoplasm of right lung (CMS/HCC)      Mr. Salinas suffers from mechanical pain of the cervical spine which I feel his symptomatic level is the facet at the C6-T1.  There is greater than 50% loss of the vertebra at C5 with destruction of the right facet and there is complete destruction of the facet and pedicle at the left C7 including the C6-T1 facet.  The thoracic lesions are small except the T12 with 60% loss and L1 with about the same running the length of the bone.  L2 demonstrates two audie with one in the right pedicle and only 30% as well as small lesions at L4, L5 and sacrum.  My concern is the stability of the cervical spine and high risk for fracture as well as the T12 and L1.  Pathology consistent with adenocarcinoma.  Molecular testing will be pending but  concensus discussion with Benjamin Curtis and Tasha is prognosis most likely at least 8-12 months.  He is at high risk for fracture and neurologic compromise.  He has disease which appears limited to lung and spine and I do not feel he has an intracranial met.  I have recommended surgical stabilization via C5 vertebrectomy and C3-T3 stabilization and percutaneous vertebroplasty at T12 and L1.  Possible need for further lumbar regions but I feel L2 is stable and only has 30% involvement.  We will proceed with surgery and cement then staged radiation and adjuvant chemotherapy.  I did discuss this at length with the patient and he does wish to have this done. We did discuss the risk of the surgery being bleeding, death, paralysis, infection, CSF leak, injury to the carotids or vertebral arteries resulting in stroke, injury to the recurrent laryngeal nerve causing temporary or permanent hoarseness and injury to the trachea or esophagus.  We did discuss the potential risk and need for further surgeries.  The patient indicates they understand and wish to proceed.  We will tentatively plan for surgery this Thursday.      Pawel Angel MD  05/05/20  09:04          Electronically signed by Pawel Angel MD at 05/05/20 0915             Pawel Angel MD at 05/05/20 0904             LOS: 5 days   Patient Care Team:  Sola Guerrero MD as PCP - General  Jeremi Blandon MD as [facility] - [facility]  Pj Hough MD as Consulting Physician (Cardiology)  Fili Cordova MD as Consulting Physician (Hematology and Oncology)    Chief Complaint:  Neck and left scapular pain    Subjective     Interval History:   NO events in PM. Pt states pain is better.  Still having pain in left scapula and neck aggravated when he turns his head.        Objective     Vital Signs  Temp:  [97.4 °F (36.3 °C)-97.5 °F (36.4 °C)] 97.4 °F (36.3 °C)  Heart Rate:  [52-76] 53  Resp:  [14-20] 16  BP: (129-169)/(46-86) 161/72    Physical  Exam:   General Appearance alert, appears stated age and cooperative  Head normocephalic, without obvious abnormality and atraumatic  Back no kyphosis present and no scoliosis present  Extremities moves extremities well, no edema, no cyanosis and no redness  Pulses Pulses palpable and equal bilaterally     Results Review:     I reviewed the patient's new clinical results.  Preliminary pathology adenocarcinoma  Medication Review: reviewed    Assessment/Plan       Malignant neoplasm of right lung (CMS/HCC)      Mr. Salinas suffers from mechanical pain of the cervical spine which I feel his symptomatic level is the facet at the C6-T1.  There is greater than 50% loss of the vertebra at C5 with destruction of the right facet and there is complete destruction of the facet and pedicle at the left C7 including the C6-T1 facet.  The thoracic lesions are small except the T12 with 60% loss and L1 with about the same running the length of the bone.  L2 demonstrates two audie with one in the right pedicle and only 30% as well as small lesions at L4, L5 and sacrum.  My concern is the stability of the cervical spine and high risk for fracture as well as the T12 and L1.  Pathology consistent with adenocarcinoma.  Molecular testing will be pending but concensus discussion with Benjamin Curtis and Tasha is prognosis most likely at least 8-12 months.  He is at high risk for fracture and neurologic compromise.  He has disease which appears limited to lung and spine and I do not feel he has an intracranial met.  I have recommended surgical stabilization via C5 vertebrectomy and C3-T3 stabilization and percutaneous vertebroplasty at T12 and L1.  Possible need for further lumbar regions but I feel L2 is stable and only has 30% involvement.  We will proceed with surgery and cement then staged radiation and adjuvant chemotherapy.  I did discuss this at length with the patient and he does wish to have this done. We did discuss the risk of the  surgery being bleeding, death, paralysis, infection, CSF leak, injury to the carotids or vertebral arteries resulting in stroke, injury to the recurrent laryngeal nerve causing temporary or permanent hoarseness and injury to the trachea or esophagus.  We did discuss the potential risk and need for further surgeries.  The patient indicates they understand and wish to proceed.  We will tentatively plan for surgery this Thursday.      Pawel Angel MD  20  09:04          Electronically signed by Pawel Angel MD at 20 0915          Physical Therapy Notes (last 48 hours) (Notes from 20 1658 through 05/10/20 1658)      Caren Caldera PTA at 20  Version 1 of          Problem: Patient Care Overview  Goal: Plan of Care Review  Outcome: Ongoing (interventions implemented as appropriate)  Flowsheets (Taken 2020)  Progress: improving  Plan of Care Reviewed With: patient  Outcome Summary: pt reported overall less assist to transfer to eob and progress to gait training.  pt still had reports of dizziness but this was intermittent and b/p was stable. pt would benefit from IP Rehab at d/c to allow pt to recover his mobility.  PPE used:   mask with faceshield and gloves       Electronically signed by Caren Caldera PTA at 2035     Caren Caldera PTA at 2038  Version 1 of          Patient Name: Clemente Salinas  : 1945    MRN: [id number]                              Today's Date: 2020       Admit Date: 2020    Visit Dx:     ICD-10-CM ICD-9-CM   1. Metastatic cancer (CMS/HCC) C79.9 199.1     Patient Active Problem List   Diagnosis   • Chest pain   • Coronary artery disease   • Hyperlipidemia   • Hypertension   • ST elevation (STEMI) myocardial infarction (CMS/[facility])   • Status post coronary artery bypass graft   • Status post percutaneous transluminal coronary angioplasty   • Systolic murmur   • Type 2 diabetes mellitus with hyperglycemia  (CMS/HCC)   • Type 2 diabetes mellitus with other diabetic neurological complication (CMS/HCC)   • Neuropathy   • Atheroscler of native artery of both legs with intermit claudication (CMS/HCC)   • Malignant neoplasm of right lung (CMS/HCC)   • Metastatic cancer (CMS/HCC)     Past Medical History:   Diagnosis Date   • BPH (benign prostatic hyperplasia)    • CAD (coronary artery disease)    • Cancer (CMS/HCC)     lung ca   • DM type 2 (diabetes mellitus, type 2) (CMS/HCC) 2001   • HLD (hyperlipidemia)    • HTN (hypertension) 2003   • Myocardial infarction (CMS/HCC)    • Peripheral neuropathy    • Vitamin D deficiency 12/2010     Past Surgical History:   Procedure Laterality Date   • BRONCHOSCOPY N/A 5/4/2020    Procedure: BRONCHOSCOPY WITH BRONCHOALVEOLAR LAVAGE AND NAVIGATION WITH FINE NEEDLE ASPIRATION, BRUSHING, AND BIOPSY X1 AREA AND ULTRASOUND WITH FINE NEEDLE ASPIRATION;  Surgeon: Jeb Velasquez MD;  Location: [facility] ENDOSCOPY;  Service: Pulmonary;  Laterality: N/A;  RLL mass   • CHOLECYSTECTOMY     • CORONARY ANGIOPLASTY WITH STENT PLACEMENT  09/09/2013    LAD   • CORONARY ARTERY BYPASS GRAFT  07/2001   • CORONARY STENT PLACEMENT  06/18/2013   • FEMORAL ARTERY STENT  08/2019     General Information     Row Name 05/09/20 0915          PT Evaluation Time/Intention    Document Type  therapy note (daily note)  -SC     Mode of Treatment  individual therapy;physical therapy  -SC     Row Name 05/09/20 0915          General Information    Existing Precautions/Restrictions  spinal;brace worn when out of bed cervical collar when out of the bed  -SC     Row Name 05/09/20 0915          Cognitive Assessment/Intervention- PT/OT    Orientation Status (Cognition)  oriented x 4  -SC     Row Name 05/09/20 0915          Safety Issues, Functional Mobility    Impairments Affecting Function (Mobility)  balance;endurance/activity tolerance;strength;pain;sensation/sensory awareness  -SC     Comment, Safety Issues/Impairments (Mobility)   pt got dizzy 2 times during tx session. b/p 121/72 mm/hg after report of dizziness  -SC       User Key  (r) = Recorded By, (t) = Taken By, (c) = Cosigned By    Initials Name Provider Type    Caren Peterson PTA Physical Therapy Assistant        Mobility     Row Name 05/09/20 0920          Bed Mobility Assessment/Treatment    Bed Mobility Assessment/Treatment  bed mobility (all) activities;rolling right;supine-sit  -SC     Rolling Right [place] (Bed Mobility)  minimum assist (75% patient effort)  -SC     Supine-Sit [place] (Bed Mobility)  minimum assist (75% patient effort)  -SC     Comment (Bed Mobility)  log roll.  assisted pt in  donning cervical collar while in supine  -SC     Row Name 05/09/20 0920          Transfer Assessment/Treatment    Comment (Transfers)  sit to stand from eob 2 times.  pt   had to sit down after first attempt after reported dizziness.  b/p stable  -SC     Row Name 05/09/20 0920          Sit-Stand Transfer    Sit-Stand [place] (Transfers)  contact guard cues for hand placement.  bed not elevated  -SC     Row Name 05/09/20 0920          Gait/Stairs Assessment/Training    Gait/Stairs Assessment/Training  gait/ambulation independence;gait/ambulation assistive device  -SC     [place] Level (Gait)  contact guard  -SC     Assistive Device (Gait)  walker, front-wheeled  -SC     Distance in Feet (Gait)  38'.    -SC     Pattern (Gait)  step-through  -SC     Deviations/Abnormal Patterns (Gait)  jesus manuel decreased  -SC     Comment (Gait/Stairs)  pt could of amb further but reported some dizziness  -SC       User Key  (r) = Recorded By, (t) = Taken By, (c) = Cosigned By    Initials Name Provider Type    Caren Peterson PTA Physical Therapy Assistant        Obj/Interventions     Row Name 05/09/20 0927          Static Sitting Balance    Level of [place] (Unsupported Sitting, Static Balance)  supervision  -SC     Sitting Position (Unsupported Sitting, Static Balance)   sitting on edge of bed  -SC     Row Name 05/09/20 0927          Dynamic Sitting Balance    Level of [place], Reaches Outside Midline (Sitting, Dynamic Balance)  supervision  -SC     Sitting Position, Reaches Outside Midline (Sitting, Dynamic Balance)  sitting on edge of bed  -SC     Row Name 05/09/20 0927          Static Standing Balance    Level of [place] (Supported Standing, Static Balance)  contact guard assist  -SC     Assistive Device Utilized (Supported Standing, Static Balance)  walker, rolling  -SC     Row Name 05/09/20 0927          Dynamic Standing Balance    Level of [place], Reaches Outside Midline (Standing, Dynamic Balance)  contact guard assist  -SC     Assistive Device Utilized (Supported Standing, Dynamic Balance)  walker, rolling  -SC       User Key  (r) = Recorded By, (t) = Taken By, (c) = Cosigned By    Initials Name Provider Type    Caren Peterson PTA Physical Therapy Assistant        Goals/Plan    No documentation.       Clinical Impression     Row Name 05/09/20 0928          Pain Assessment    Additional Documentation  Pain Scale: FACES Pre/Post-Treatment (Group)  -SC     Row Name 05/09/20 0928          Pain Scale: Numbers Pre/Post-Treatment    Pre/Post Treatment Pain Comment  neck  -SC     Row Name 05/09/20 0928          Pain Scale: FACES Pre/Post-Treatment    Pain: FACES Scale, Pretreatment  2-->hurts little bit  -SC     Pain: FACES Scale, Post-Treatment  4-->hurts little more  -SC     Row Name 05/09/20 0928          Physical Therapy Clinical Impression    Rehab Potential (PT Clinical Summary)  good, to achieve stated therapy goals  -SC     Row Name 05/09/20 0928          Vital Signs    Intra Systolic BP Rehab  121  -SC     Intra Treatment Diastolic BP  72  -SC     O2 Delivery Pre Treatment  room air  -SC     O2 Delivery Intra Treatment  room air  -SC     O2 Delivery Post Treatment  room air  -[facility] 05/09/20 0928          Positioning and Restraints     Pre-Treatment Position  in bed  -SC     Post Treatment Position  chair  -SC     In Chair  notified nsg;sitting;call light within reach wearing cervical collar  -SC       User Key  (r) = Recorded By, (t) = Taken By, (c) = Cosigned By    Initials Name Provider Type    Caren Peterson PTA Physical Therapy Assistant        Outcome Measures     Row Name 05/09/20 0932          How much help from another person do you currently need...    Turning from your back to your side while in flat bed without using bedrails?  3  -SC     Moving from lying on back to sitting on the side of a flat bed without bedrails?  3  -SC     Moving to and from a bed to a chair (including a wheelchair)?  3  -SC     Standing up from a chair using your arms (e.g., wheelchair, bedside chair)?  3  -SC     Climbing 3-5 steps with a railing?  2  -SC     To walk in hospital room?  3  -SC     AM-PAC 6 Clicks Score (PT)  17  -SC     Row Name 05/09/20 0932          Functional Assessment    Outcome Measure Options  AM-PAC 6 Clicks Basic Mobility (PT)  -SC       User Key  (r) = Recorded By, (t) = Taken By, (c) = Cosigned By    Initials Name Provider Type    Caren Peterson PTA Physical Therapy Assistant        Physical Therapy Education                 Title: PT OT SLP Therapies (Done)     Topic: Physical Therapy (Done)     Point: Mobility training (Done)     Description:   Instruct learner(s) on safety and technique for assisting patient out of bed, chair or wheelchair.  Instruct in the proper use of assistive devices, such as walker, crutches, cane or brace.              Patient Friendly Description:   It's important to get you on your feet again, but we need to do so in a way that is safe for you. Falling has serious consequences, and your personal safety is the most important thing of all.        When it's time to get out of bed, one of us or a family member will sit next to you on the bed to give you support.     If your doctor or nurse tells  you to use a walker, crutches, a cane, or a brace, be sure you use it every time you get out of bed, even if you think you don't need it.    Learning Progress Summary           Patient Acceptance, E, DU,NR by SC at 5/9/2020 0933    Acceptance, E,TB, VU,DU by  at 5/8/2020 1601                   Point: Precautions (Done)     Description:   Instruct learner(s) on prescribed precautions during mobility and gait tasks              Learning Progress Summary           Patient Acceptance, E, DU,NR by SC at 5/9/2020 0933    Acceptance, E,TB, VU,DU by  at 5/8/2020 1601                               User Key     Initials Effective Dates Name Provider Type Discipline     03/01/19 -  Deidre Moscoso, PT Physical Therapist PT    SC 03/01/19 -  Caren Caldera PTA Physical Therapy Assistant PT              PT Recommendation and Plan     Outcome Summary/Treatment Plan (PT)  Anticipated Discharge Disposition (PT): inpatient rehabilitation facility  Plan of Care Reviewed With: patient  Progress: improving  Outcome Summary: pt reported overall less assist to transfer to eob and progress to gait training.  pt still had reports of dizziness but this was intermittent and b/p was stable. pt would benefit from IP Rehab at d/ to allow pt to recover his mobility.  PPE used:   mask with faceshield and gloves     Time Calculation:   PT Charges     Row Name 05/09/20 0937             Time Calculation    Start Time  0835  -SC      Stop Time  0900  -SC      Time Calculation (min)  25 min  -SC      PT Received On  05/09/20  -SC      PT - Next Appointment  05/10/20  -SC         Time Calculation- PT    Total Timed Code Minutes- PT  25 minute(s)  -SC         Timed Charges    02255 - Gait Training Minutes   10  -SC      83201 - PT Therapeutic Activity Minutes  15  -SC        User Key  (r) = Recorded By, (t) = Taken By, (c) = Cosigned By    Initials Name Provider Type    SC Caren Caldera PTA Physical Therapy Assistant        Therapy  Charges for Today     Code Description Service Date Service Provider Modifiers Qty    40839969308 HC GAIT TRAINING EA 15 MIN 2020 Caren Caldera PTA GP 1    60415020976 HC PT THERAPEUTIC ACT EA 15 MIN 2020 Caren Caldera PTA GP 1          PT G-Codes  Outcome Measure Options: AM-PAC 6 Clicks Basic Mobility (PT)  AM-PAC 6 Clicks Score (PT): 17    Caren Caldera PTA  2020         Electronically signed by Caren Caldera PTA at 20 0938     Danyell Scott PT at 05/10/20 1124  Version 1          Problem: Patient Care Overview  Goal: Plan of Care Review  Outcome: Ongoing (interventions implemented as appropriate)  Flowsheets (Taken 5/10/2020 1120)  Plan of Care Reviewed With: patient  Outcome Summary: Pt very drowsy this AM, states all he feels like doing is sleeping, has not been OOB.  Donned cervical collar in supine, required min/CGA to roll and transfer to EOB.  Pt c/o dizziness in sitting BP 96/57, sat x 5 minutes.  Stood x 2, c/o worsening dizziness BP 84/53.  Pt only able to ambulate short distance in room.  Decline from previous sessions, RN aware.  Recommend IP rehab at d/c.  Pt up in chair at end of session.  PPE worn:  gloves and mask with face shield       Electronically signed by Danyell Scott PT at 05/10/20 1125     Danyell Scott PT at 05/10/20 1125  Version 1          Patient Name: Clemente Salinas  : 1945    MRN: [id number]                              Today's Date: 5/10/2020       Admit Date: 2020    Visit Dx:     ICD-10-CM ICD-9-CM   1. Metastatic cancer (CMS/HCC) C79.9 199.1     Patient Active Problem List   Diagnosis   • Chest pain   • Coronary artery disease   • Hyperlipidemia   • Hypertension   • ST elevation (STEMI) myocardial infarction (CMS/HCC)   • Status post coronary artery bypass graft   • Status post percutaneous transluminal coronary angioplasty   • Systolic murmur   • Type 2 diabetes mellitus with hyperglycemia (CMS/HCC)   • Type 2 diabetes  mellitus with other diabetic neurological complication (CMS/HCC)   • Neuropathy   • Atheroscler of native artery of both legs with intermit claudication (CMS/HCC)   • Malignant neoplasm of right lung (CMS/HCC)   • Metastatic cancer (CMS/HCC)     Past Medical History:   Diagnosis Date   • BPH (benign prostatic hyperplasia)    • CAD (coronary artery disease)    • Cancer (CMS/HCC)     lung ca   • DM type 2 (diabetes mellitus, type 2) (CMS/HCC) 2001   • HLD (hyperlipidemia)    • HTN (hypertension) 2003   • Myocardial infarction (CMS/HCC)    • Peripheral neuropathy    • Vitamin D deficiency 12/2010     Past Surgical History:   Procedure Laterality Date   • BRONCHOSCOPY N/A 5/4/2020    Procedure: BRONCHOSCOPY WITH BRONCHOALVEOLAR LAVAGE AND NAVIGATION WITH FINE NEEDLE ASPIRATION, BRUSHING, AND BIOPSY X1 AREA AND ULTRASOUND WITH FINE NEEDLE ASPIRATION;  Surgeon: Jeb Velasquez MD;  Location: [facility] ENDOSCOPY;  Service: Pulmonary;  Laterality: N/A;  RLL mass   • CHOLECYSTECTOMY     • CORONARY ANGIOPLASTY WITH STENT PLACEMENT  09/09/2013    LAD   • CORONARY ARTERY BYPASS GRAFT  07/2001   • CORONARY STENT PLACEMENT  06/18/2013   • FEMORAL ARTERY STENT  08/2019     General Information     [facility] Name 05/10/20 1113          PT Evaluation Time/Intention    Document Type  therapy note (daily note)  -     Mode of Treatment  individual therapy;physical therapy  -[facility] Name 05/10/20 1113          General Information    Existing Precautions/Restrictions  spinal;brace worn when out of bed monitor BP with activity  -[facility] Name 05/10/20 1113          Cognitive Assessment/Intervention- PT/OT    Orientation Status (Cognition)  oriented to;person  -     Cognitive Assessment/Intervention Comment  delayed responses, very drowsy  -     Row Name 05/10/20 1113          Safety Issues, Functional Mobility    Impairments Affecting Function (Mobility)  balance;cognition;endurance/activity tolerance;pain;strength  -       User Key  (r) =  Recorded By, (t) = Taken By, (c) = Cosigned By    Initials Name Provider Type    Danyell Juarez, YISSEL Physical Therapist        Mobility     Row Name 05/10/20 1117          Bed Mobility Assessment/Treatment    Bed Mobility Assessment/Treatment  rolling left;rolling right;supine-sit  -     Rolling Left [place] (Bed Mobility)  minimum assist (75% patient effort)  -     Rolling Right [place] (Bed Mobility)  minimum assist (75% patient effort)  -     Supine-Sit [place] (Bed Mobility)  minimum assist (75% patient effort);1 person assist  -     Assistive Device (Bed Mobility)  bed rails  -     Comment (Bed Mobility)  logroll, donned cervical collar in supine  -     Row Name 05/10/20 1117          Transfer Assessment/Treatment    Comment (Transfers)  stood x 2, monitored orthostatic vitals  -     Row Name 05/10/20 1117          Sit-Stand Transfer    Sit-Stand [place] (Transfers)  minimum assist (75% patient effort);1 person assist cues to push up from bedrail and chair arm  -     Assistive Device (Sit-Stand Transfers)  walker, front-wheeled  -     Row Name 05/10/20 1117          Gait/Stairs Assessment/Training    [place] Level (Gait)  contact guard;1 person assist had aide present for safety due to pt dizziness and low BP  -     Assistive Device (Gait)  walker, front-wheeled  -     Distance in Feet (Gait)  15'  -     Bilateral Gait Deviations  heel strike decreased  -     Comment (Gait/Stairs)  limited distance due to dizziness  -       User Key  (r) = Recorded By, (t) = Taken By, (c) = Cosigned By    Initials Name Provider Type    Danyell Juarez PT Physical Therapist        Obj/Interventions     Row Name 05/10/20 1120          Static Sitting Balance    Level of [place] (Unsupported Sitting, Static Balance)  supervision  -     Sitting Position (Unsupported Sitting, Static Balance)  sitting on edge of bed  -     Row Name 05/10/20 1120          Dynamic Sitting  Balance    Level of [place], Reaches Outside Midline (Sitting, Dynamic Balance)  supervision  -     Sitting Position, Reaches Outside Midline (Sitting, Dynamic Balance)  sitting on edge of bed  -[facility] Name 05/10/20 1120          Static Standing Balance    Level of [place] (Supported Standing, Static Balance)  contact guard assist  -     Assistive Device Utilized (Supported Standing, Static Balance)  walker, rolling  -[facility] Name 05/10/20 1120          Dynamic Standing Balance    Level of [place], Reaches Outside Midline (Standing, Dynamic Balance)  minimal assist, 75% patient effort  -     Assistive Device Utilized (Supported Standing, Dynamic Balance)  walker, rolling  -       User Key  (r) = Recorded By, (t) = Taken By, (c) = Cosigned By    Initials Name Provider Type    Danyell Juarez PT Physical Therapist        Goals/Plan     Row Name 05/10/20 1124          Bed Mobility Goal 1 (PT)    Progress/Outcomes (Bed Mobility Goal 1, PT)  continuing progress toward goal  -[facility]     Row Name 05/10/20 1124          Transfer Goal 1 (PT)    Progress/Outcome (Transfer Goal 1, PT)  continuing progress toward goal  -[facility]     Row Name 05/10/20 1124          Gait Training Goal 1 (PT)    Progress/Outcome (Gait Training Goal 1, PT)  continuing progress toward goal  -       User Key  (r) = Recorded By, (t) = Taken By, (c) = Cosigned By    Initials Name Provider Type    Danyell Juarez PT Physical Therapist        Clinical Impression     Row Name 05/10/20 1120          Pain Scale: Numbers Pre/Post-Treatment    Pain Scale: Numbers, Pretreatment  4/10  -     Pain Scale: Numbers, Post-Treatment  4/10  -     Pain Location  neck  -[facility] 05/10/20 1120          Plan of Care Review    Plan of Care Reviewed With  patient  -     Progress  declining  -     Outcome Summary  Pt very drowsy this AM, states all he feels like doing is sleeping, has not been OOB.  Donned cervical collar in supine,  required min/CGA to roll and transfer to EOB.  Pt c/o dizziness in sitting BP 96/57, sat x 5 minutes.  Stood x 2, c/o worsening dizziness BP 84/53.  Pt only able to ambulate short distance in room.  Decline from previous sessions, RN aware.  Recommend IP rehab at d/c.  Pt up in chair at end of session.  PPE worn:  gloves and mask with face shield  -[facility] Name 05/10/20 1120          Physical Therapy Clinical Impression    Rehab Potential (PT Clinical Summary)  good, to achieve stated therapy goals  -[facility] Name 05/10/20 1120          Vital Signs    Pre Systolic BP Rehab  96 sitting EOB  -     Pre Treatment Diastolic BP  57  -     Intra Systolic BP Rehab  84 after brief episode of standing  -     Intra Treatment Diastolic BP  53  -     O2 Delivery Pre Treatment  room air  -     O2 Delivery Intra Treatment  room air  -     O2 Delivery Post Treatment  room air  [facility] Name 05/10/20 1120          Positioning and Restraints    Pre-Treatment Position  in bed  -     Post Treatment Position  chair  -     In Chair  notified nsg;sitting;call light within reach;exit alarm on  -       User Key  (r) = Recorded By, (t) = Taken By, (c) = Cosigned By    Initials Name Provider Type     Daynell Scott, YISSEL Physical Therapist        Outcome Measures    No documentation.       Physical Therapy Education                 Title: PT OT SLP Therapies (Done)     Topic: Physical Therapy (Done)     Point: Mobility training (Done)     Description:   Instruct learner(s) on safety and technique for assisting patient out of bed, chair or wheelchair.  Instruct in the proper use of assistive devices, such as walker, crutches, cane or brace.              Patient Friendly Description:   It's important to get you on your feet again, but we need to do so in a way that is safe for you. Falling has serious consequences, and your personal safety is the most important thing of all.        When it's time to get out of bed, one  of us or a family member will sit next to you on the bed to give you support.     If your doctor or nurse tells you to use a walker, crutches, a cane, or a brace, be sure you use it every time you get out of bed, even if you think you don't need it.    Learning Progress Summary           Patient Acceptance, E,TB, VU by  at 5/10/2020 1124    Acceptance, E,TB, VU by  at 5/10/2020 1032    Acceptance, E, DU,NR by SC at 5/9/2020 0933    Acceptance, E,TB, VU,DU by  at 5/8/2020 1601                   Point: Precautions (Done)     Description:   Instruct learner(s) on prescribed precautions during mobility and gait tasks              Learning Progress Summary           Patient Acceptance, E,TB, VU by  at 5/10/2020 1124    Acceptance, E,TB, VU by  at 5/10/2020 1032    Acceptance, E, DU,NR by SC at 5/9/2020 0933    Acceptance, E,TB, VU,DU by  at 5/8/2020 1601                               User Key     Initials Effective Dates Name Provider Type Discipline     03/01/19 -  Danyell Scott, PT Physical Therapist PT     03/01/19 -  Deidre Moscoso, PT Physical Therapist PT    SC 03/01/19 -  Caren Caldera PTA Physical Therapy Assistant PT     03/11/20 -  Shanell Sorto, RN Registered Nurse Nurse              PT Recommendation and Plan     Plan of Care Reviewed With: patient  Progress: declining  Outcome Summary: Pt very drowsy this AM, states all he feels like doing is sleeping, has not been OOB.  Donned cervical collar in supine, required min/CGA to roll and transfer to EOB.  Pt c/o dizziness in sitting BP 96/57, sat x 5 minutes.  Stood x 2, c/o worsening dizziness BP 84/53.  Pt only able to ambulate short distance in room.  Decline from previous sessions, RN aware.  Recommend IP rehab at d/c.  Pt up in chair at end of session.  PPE worn:  gloves and mask with face shield     Time Calculation:   PT Charges     Row Name 05/10/20 1125             Time Calculation    Start Time  0930  -      Stop Time   0950  -      Time Calculation (min)  20 min  -      PT Received On  05/10/20  -         Time Calculation- PT    Total Timed Code Minutes- PT  20 minute(s)  -        User Key  (r) = Recorded By, (t) = Taken By, (c) = Cosigned By    Initials Name Provider Type    Danyell Juarez, PT Physical Therapist        Therapy Charges for Today     Code Description Service Date Service Provider Modifiers Qty    67929277891 HC GAIT TRAINING EA 15 MIN 5/10/2020 Danyell Scott, PT GP 1    [id number]  PT THERAPEUTIC ACT EA 15 MIN 5/10/2020 Danyell Scott, PT GP 1          PT G-Codes  Outcome Measure Options: AM-PAC 6 Clicks Basic Mobility (PT)  AM-PAC 6 Clicks Score (PT): 17    Danyell Scott PT  5/10/2020         Electronically signed by Danyell Scott PT at 05/10/20 1124

## 2020-05-10 NOTE — PLAN OF CARE
Pt states that pain is fairly well controlled with PO pain meds, but has occasional break through pain. VSS on room air. Pt denies any other distress at this time.

## 2020-05-11 PROBLEM — Z98.1 S/P CERVICAL SPINAL FUSION: Status: ACTIVE | Noted: 2020-01-01

## 2020-05-11 NOTE — PROGRESS NOTES
Continued Stay Note  [facility]     Patient Name: Clemente Salinas  MRN: [id number]  Today's Date: 5/11/2020    Admit Date: 5/7/2020    Discharge Plan     Row Name 05/11/20 1036       Plan    Plan  Patient no longer wanting to to to rehab. Patient needs to go home with home health. Waiting on return call from family.    Plan Comments  Patient should discharge home today.               Anna [place] [address]   [phone]  office  [phone]  fax  [email or web address]

## 2020-05-11 NOTE — DISCHARGE PLACEMENT REQUEST
"Clemente Donis (74 y.o. Male)     Date of Birth Social Security Number Address Home Phone MRN    1945  [id number] [place] [facility] [phone] [id number]    Sikhism Marital Status          P[employer]alism        Admission Date Admission Type Admitting Provider Attending Provider Department, Room/Bed    5/7/20 Elective Pawel Angel MD Shanks, Todd Stiles, MD [facility] SURGICAL INPATIENT, 4105/1    Discharge Date Discharge Disposition Discharge Destination                       Attending Provider:  Pawel Angel MD    Allergies:  No Known Allergies    Isolation:  None   Infection:  None   Code Status:  CPR    Ht:  175.3 cm (69.02\")   Wt:  82.3 kg (181 lb 7 oz)    Admission Cmt:  None   Principal Problem:  Metastatic cancer (CMS/HCC) [C79.9] More...                 Active Insurance as of 5/7/2020     Primary Coverage     Payor Plan Insurance Group Employer/Plan Group    MEDICARE MEDICARE A & B      Payor Plan Address Payor Plan Phone Number Payor Plan Fax Number Effective Dates    [address] [phone]  11/1/2012 - None Entered    [facility] [id number]       Subscriber Name Subscriber Birth Date Member ID       CLEMENTE DONIS 1945 [id number]           Secondary Coverage     Payor Plan Insurance Group Employer/Plan Group    [facility] [id number]     Payor Plan Address Payor Plan Phone Number Payor Plan Fax Number Effective Dates    [address]   12/1/2016 - None Entered    [facility] [id number]       Subscriber Name Subscriber Birth Date Member ID       CLEMENTE DONIS 1945 [id number]                 Emergency Contacts      (Rel.) Home Phone Work Phone Mobile Phone    HEBERT DONIS (Spouse) -- -- [phone]              "

## 2020-05-11 NOTE — PLAN OF CARE
Patient did well with PT this morning and was able to ambulate in crum with walker. Dr. Angel was going to let him go home this afternoon but patient and his wife preferred that he stay another day. Pain has dick controlled on oral narcotics.

## 2020-05-11 NOTE — PROGRESS NOTES
Continued Stay Note  [facility]     Patient Name: Clemente Salinas  MRN: [id number]  Today's Date: 5/11/2020    Admit Date: 5/7/2020    Discharge Plan     Row Name 05/11/20 1039       Plan    Plan  New referral placed to [facility] per family request.     Provided Post Acute Provider List?  Yes    Post Acute Provider List  Home Health    Method of Delivery  Telephone    Row Name 05/11/20 1036       Plan    Plan  Patient no longer wanting to to to rehab. Patient needs to go home with home health. Waiting on return call from family.    Plan Comments  Patient should discharge home today.    Row Name 05/11/20 6988       Plan    Plan  Patient no longer wants to go to rehab. Patient wants to go routine discharge home.             [place] [address]   [phone]  office  [phone]  fax  [email or web address]

## 2020-05-11 NOTE — PLAN OF CARE
Problem: Patient Care Overview  Goal: Plan of Care Review  Outcome: Ongoing (interventions implemented as appropriate)  Flowsheets (Taken 5/11/2020 1218)  Plan of Care Reviewed With: patient  Outcome Summary: Patient much improved; now only requiring verbal cues for supine to sit transfer and supervision to ambulate 150 ft without loss of balance, no gait deficits noted. Pt still with gen weakness and unable to ambulate without a.d. however pt claims he has rw at home. At this time, pt is safe to return home with assist and HH PT to f/u for home assessment and family training. No report of dizziness or inc pain reported. PPE used gloves, mask, face shield.

## 2020-05-11 NOTE — DISCHARGE SUMMARY
Clemente Salinas  1945    Patient Care Team:  Sola Guerrero MD as PCP - General  Jeremi Blandon MD as PCP - Claims Attributed  Pj Hough MD as Consulting Physician (Cardiology)  Fili Cordova MD as Consulting Physician (Hematology and Oncology)    Date of Admit: 5/7/2020    Date of Discharge:  5/11/2020    Discharge Diagnosis:  Metastatic cancer (CMS/HCC)    S/P cervical spinal fusion      Procedures Performed  Procedure(s):  POSTERIOR CERVICAL FUSION, C5 VERTEBRECTOMY C5 AND PLACEMENT OF ANTERIOR CAGE C3-T3 POSTERIOR FUSION T12,  CERVICAL DISCECTOMY ANTERIOR WITH FUSION  VERTEBROPLASTY LUMBAR 1       Complications: None    Consultants:   Consults     Date and Time Order Name Status Description    5/9/2020 1241 Hematology & Oncology Inpatient Consult Completed     5/3/2020 1043 Inpatient Radiation Oncology Consult Completed     4/30/2020 1639 Inpatient Neurosurgery Consult Completed     4/29/2020 1236 Inpatient Pulmonology Consult Completed     4/28/2020 1909 Family Medicine Consult Completed           Condition on Discharge: stable    Discharge disposition: home    HPI: Clemente Salinas is a 74 y.o. male who presented one week prior to [facility] ED with back and shoulder pain. Imaging demonstrated a newly found RLL mass. Additional imaging ordered and show numerous mets throughout spine. Pathology consistent with adenocarcinoma.  Molecular testing is pending.   Pt will undergo staged radiation and adjuvant chemotherapy.     Hospital Course: Patient admitted for above procedure. The patient was transferred to SIPS/ICU following recovery. Patient   Experienced immediate post operative urine retention for a couple of hours requiring straight cath X1.  Patient was able to void with no issues next am. Post op films showed stable cervical hardware and expected changes in lumbar spine. Patient hemoglobin stable, pain controlled well with oral medication. Patient ambulating well with walker per PT.      "  Vitals:    05/11/20 1146   BP: 111/59   Pulse:    Resp: 14   Temp: 98.3 °F (36.8 °C)   SpO2: 96%         Lab Results (last 24 hours)     Procedure Component Value Units Date/Time    POC Glucose Once [[id number]]  (Abnormal) Collected:  05/11/20 1143    Specimen:  Blood Updated:  05/11/20 1149     Glucose 141 mg/dL      Comment: Serial Number: [id number]:  [id number]       Tissue Pathology Exam [[id number]] Collected:  05/07/20 1400    Specimen:  Bone from Spine, Cervical Updated:  05/11/20 1043     Case Report --     Surgical Pathology Report                         Case: [id number]                                  Authorizing Provider:  Pawel Angel MD    Collected:           05/07/2020 02:00 PM          Ordering Location:     [facility] MAIN  Received:            05/08/2020 07:07 AM                                 OR                                                                           Pathologist:           Jose Juan Wilburn MD                                                            Specimen:    Spine, Cervical, cervical five vertebral body                                               Final Diagnosis --     Intervertebral disc, C5, discectomy:    Moderate to poorly differentiated adenocarcinoma consistent with metastasis from the patient's known pulmonary primary    ROLAND/sms        Gross Description --     Received in a single container of formalin labeled \"Cervical 5 vertebral body\" are several tan to light brown irregular fragments of fibrous and bony tissue, 2.2 x 1.8 x 0.4 cm in aggregate. The larger fragments are bisected and the entire specimen is submitted in a single cassette following decalcification.      CHANCE/sms          POC Glucose Once [[id number]]  (Abnormal) Collected:  05/11/20 0804    Specimen:  Blood Updated:  05/11/20 0805     Glucose 178 mg/dL      Comment: Serial Number: [id number]:  [id number]       CBC & Differential [[id number]] Collected:  05/11/20 0423    " Specimen:  Blood Updated:  05/11/20 0454    Narrative:       The following orders were created for panel order CBC & Differential.  Procedure                               Abnormality         Status                     ---------                               -----------         ------                     CBC Auto Differential[[id number]]        Abnormal            Final result                 Please view results for these tests on the individual orders.    CBC Auto Differential [[id number]]  (Abnormal) Collected:  05/11/20 0423    Specimen:  Blood Updated:  05/11/20 0454     WBC 13.40 10*3/mm3      RBC 3.50 10*6/mm3      Hemoglobin 9.5 g/dL      Hematocrit 29.1 %      MCV 83.0 fL      MCH 27.0 pg      MCHC 32.6 g/dL      RDW 16.6 %      RDW-SD 49.4 fl      MPV 7.3 fL      Platelets 212 10*3/mm3      Neutrophil % 77.8 %      Lymphocyte % 9.5 %      Monocyte % 11.6 %      Eosinophil % 0.8 %      Basophil % 0.3 %      Neutrophils, Absolute 10.50 10*3/mm3      Lymphocytes, Absolute 1.30 10*3/mm3      Monocytes, Absolute 1.60 10*3/mm3      Eosinophils, Absolute 0.10 10*3/mm3      Basophils, Absolute 0.00 10*3/mm3      nRBC 0.0 /100 WBC     POC Glucose Once [[id number]]  (Abnormal) Collected:  05/10/20 2025    Specimen:  Blood Updated:  05/10/20 2026     Glucose 153 mg/dL      Comment: Serial Number: [id number]:  [id number]       POC Glucose Once [[id number]]  (Abnormal) Collected:  05/10/20 1707    Specimen:  Blood Updated:  05/10/20 1709     Glucose 154 mg/dL      Comment: Serial Number: [id number]:  [id number]             Imaging Results (Last 24 Hours)     ** No results found for the last 24 hours. **            Discharge Physical Exam:  Alert and oriented by 3  Speech is intact and coherent articulate with good content and production  Cranial nerves III through XII are grossly intact with pupils symmetric and reactive and no gaze paresis or nystagmus  Motor strength is 5/5 in both upper and lower extremities  with no focal motor deficits  No evidence of DVT  Incision  clean, dry, and intact and approximated with staples. No swelling, edema, redness noted.          Discharge Medications  Inspect has been reviewed and narcotic consent is on file in the patient's chart.     Your medication list      START taking these medications      Instructions Last Dose Given Next Dose Due   HYDROcodone-acetaminophen  MG per tablet  Commonly known as:  NORCO      Take 1 tablet by mouth Every 4 (Four) Hours As Needed for Moderate Pain  (Pain).       pregabalin 100 MG capsule  Commonly known as:  LYRICA      Take 1 capsule by mouth Every 12 (Twelve) Hours.          CHANGE how you take these medications      Instructions Last Dose Given Next Dose Due   nitroglycerin 0.4 MG SL tablet  Commonly known as:  NITROSTAT  What changed:  See the new instructions.      DISSOLVE ONE TABLET UNDER TONGUE AS NEEDED FOR CHEST PAIN          CONTINUE taking these medications      Instructions Last Dose Given Next Dose Due   ALPRAZolam 1 MG tablet  Commonly known as:  XANAX      Take 1 tablet by mouth Every Night for 8 doses.       amLODIPine 10 MG tablet  Commonly known as:  NORVASC      Take 10 mg by mouth Daily.       aspirin 81 MG EC tablet      Take 81 mg by mouth Daily.       atenolol 25 MG tablet  Commonly known as:  TENORMIN      Take 25 mg by mouth 2 (Two) Times a Day.       clopidogrel 75 MG tablet  Commonly known as:  PLAVIX      Take 75 mg by mouth Daily. Pt has not taken since last Monday       [facility] Vitamin D3 25 MCG (1000 UT) chewable tablet  Generic drug:  Cholecalciferol      Chew 1 tablet Daily. Do not take day of surgery       dexamethasone 4 MG tablet  Commonly known as:  DECADRON      Take 1 tablet by mouth Every 12 (Twelve) Hours.       ferrous sulfate 324 (65 Fe) MG tablet delayed-release EC tablet      Take 1 tablet by mouth 2 (Two) Times a Day With Meals.       finasteride 5 MG tablet  Commonly known as:  PROSCAR      Take 5 mg  by mouth Every Night.       fish oil 1000 MG capsule capsule      Take 1,000 mg by mouth Daily With Breakfast.       glimepiride 2 MG tablet  Commonly known as:  AMARYL      Take 4 mg by mouth 2 (Two) Times a Day. Do not take day of surgery       isosorbide mononitrate 60 MG 24 hr tablet  Commonly known as:  IMDUR      Take 60 mg by mouth Daily.       losartan 100 MG tablet  Commonly known as:  COZAAR      Take 100 mg by mouth Daily. Do not take day of surgery       metFORMIN  MG 24 hr tablet  Commonly known as:  GLUCOPHAGE-XR      Take 2,000 mg by mouth Daily Before Supper.       niacin 500 MG CR tablet  Commonly known as:  NIASPAN      Take 1 tablet by mouth Daily. Do not take day of surgery       rosuvastatin 10 MG tablet  Commonly known as:  CRESTOR      Take 1 tablet by mouth Daily.             Where to Get Your Medications      These medications were sent to [facility][id number] - [facility] [place] - [address] AT [facility] [address] [facility] - [phone]  - [phone]   [address] [place] [address]    Phone:  [phone]   · HYDROcodone-acetaminophen  MG per tablet  · pregabalin 100 MG capsule         Discharge Diet: Resume home diet. Add high protein nutritional supplement three times a day.      Activity at Discharge:   Activity Instructions     Discharge Activity      [facility]   [address] [place] [address] [place]  [address]   P: [phone]  F: [phone]    Pawel Angel M.D., F.A.C.S     INSTRUCTION & CARE AFTER YOUR POSTERIOR CERVICAL FUSION    No lifting anything heavier than a gallon of milk    No driving for one week. We recommend that you limit riding in a car because of the risk of an accident. If you are a passenger, wear your seatbelt.    You may bend over or reach over your head as long as you don't lift anything heavy.    Wear your cervical collar when you are out of bed more than 15 minutes and when you are in a vehicle.  You do not have to wear the collar when you are eating or drinking or sleeping.     Walk as much as possible.    Remove the bandage on the second day after surgery and leave the incision open to air. If you notice any redness, swelling or drainage, call the office.    You may shower 48 hours after surgery. Don't let the water beat directly on the incision. Gently wash the incision with mild soap and water and pat the incision dry.     Call as soon as possible after leaving the hospital to schedule an appointment with the Physician Assistant or Nurse Practitioner if any appointment has not already been made.    Your prescription for pain medication may be refilled on your follow up appointment if needed. Due to changes in Federal Law in order to have this medication refilled you must contact the office four days prior to the due date and make arrangements to pick the prescription up in the office.    Do NOT take any anti-inflammatory medications, to include Ibuprofen, Mobic, Aleve, Naproxen, Celebrex.    Don't be alarmed if you experience some of your pre-operative symptoms after going home. This is not uncommon and normally goes away in a few days but may last longer. Pain, aching and stiffness in the neck, across the shoulders and between the shoulder blades are very common. If you have any questions or concerns don't hesitate to call the office.    Nausea is common in the post operative period. Always take your pain medication with food, never on an empty stomach.     Constipation is common in the post operative period. We do not want you to strain to have a bowel movement. You may take a stool softener or laxative as needed. Drink plenty of fluids to include water and juice.          Call for: questions or concerns    Follow-up Appointments  Future Appointments   Date Time Provider Department Center   7/6/2020 12:20 PM Pj Hough MD [facility]      Contact information for follow-up providers        Fili Cordova MD Follow up.    Specialties:  Hematology and Oncology, Oncology, Hematology  Contact information:  [address]  [address]  [place] [address]  [phone]             Hebert Curtis MD. Schedule an appointment as soon as possible for a visit.    Specialty:  Radiation Oncology  Contact information:  [address]  [place] [address]  [phone]             Sola Guerrero MD .    Specialty:  Family Medicine  Contact information:  [address]  [address]  [place] [id number] [phone]                   Contact information for after-discharge care     Home Medical Care     [facility] .    Service:  Home Health Services  Contact information:  [address]  [facility] [id number] [phone]                               I discussed the discharge instructions with patient    Marylou MartelRON clements  05/11/20  16:51

## 2020-05-11 NOTE — PROGRESS NOTES
Hematology/Oncology Inpatient Progress Note    PATIENT NAME: Clemente Salinas  : 1945  MRN: [id number]    CHIEF COMPLAINT: Metastatic adenocarcinoma of the RLL lung    HISTORY OF PRESENT ILLNESS:    74 y.o. male admitted 20 for posterior cervical fusion, anterior cervical discectomy with fusion, and vertebroplasty of T12 and L1 secondary to metastatic lung cancer. Postoperatively hemoglobin dropped to 9.7 on 2020.     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  He was initially seen during hospitalization 20 to 20 with bilateral shoulder and back pain. It seemed to be worse on the left side.  He claimed to have been feeling weak for about a month prior to admission.  He had a cough without fever in 2019 and his wife had a cough also at that time which had resolved. He had a course of steroids as outpatient for the shoulder pain without relief. He had lost 20 pounds of weight since 2019.  He had shortness of air on exertion but no hemoptysis. He had chronic swelling of the right foot after coronary artery bypass surgery. During the admission, he was noted to have hypercalcemia with calcium 12.6 (8.6-10.5), alkaline phosphatase 187 (), D-dimer 3.77 (0.17-0.59), WBC 8.6 hemoglobin 12.7 MCV 81.8 RDW 16.7 platelet count 261,000, RVP negative.  SARS-CoV-2 was not detected.  Chest x-ray revealed a right perihilar and basilar patchy density and CT chest revealed dense masslike consolidation in the apical segment of the right lower lung with surrounding opacities.  Nodular opacities in both lungs were present.  Diffuse lytic osseous metastatic disease was present with large destructive lesion in the right C 7 posterior elements with large lytic lesion of the T8, T12 and L1 vertebral bodies.  Possible metastatic soft tissue  nodule in the lateral left chest wall/axilla was present. CT abdomen and pelvis showed 2 small liver lesions measuring up to 14 mm, indeterminate for cyst versus metastatic disease.  CT cervical, thoracic, and lumbar spine showed extensive lytic lesions.  There was a pathologic fracture at C5 with involvement of the left transverse process and transverse foramen.  There was a lytic lesion at C6-7. There were areas of cortical breakthrough and nondisplaced pathologic fracture in the lumbar spine.  There was no evidence of retropulsion or soft tissue tumoral extension into the osseous spinal canal.  CT head was negative for metastatic disease. MRI brain showed 11 x 7 mm subcortical left frontal lobe lesion felt possibly metastatic disease with differential also including subacute infarct.  No hemorrhage or edema was present and patient was asymptomatic.  Plan was to follow the brain lesion with repeat imaging as outpatient. MRI C and T-spine showed extensive osseous metastatic disease most pronounced on the right at C6-7.  There is no abnormal enhancement of epidural space or cervical spinal cord and no significant central canal narrowing.  There was limited evaluation of the neural foramina with suspected narrowing at sites of metastatic destruction including C4-C5, right C5-C6, and right C6-C7.  The largest lesion in the T-spine was at T5 with mild expansion which had peripheral enhancement and measured 3.6 x 2.3 x 2.4 cm.  There was no significant central canal narrowing or neural foraminal narrowing.  There were enhancing lesions involving many of the ribs. He received Zometa on 4/29/20 with normalization of calcium level. He was started on dexamethasone due to spinal metastases. He underwent navigational bronchoscopy 5/4/20 by Dr. Velasquez with right lower lobe mass biopsy positive for invasive moderately differentiated adenocarcinoma.  FNA of RLL lung mass revealed non-small cell lung carcinoma consistent with  adenocarcinoma. FNA from R12 lobar lymph node revealed rare atypical cells consistent with non-small cell carcinoma.  Bronchoscopy smears showed few atypical cell groups suspicious for carcinoma in a background of abundant ciliated bronchial epithelial cells and brushing was positive for non-small cell carcinoma.  After discussion with neurosurgery (Dr. Angel) and radiation therapy (Dr. Curtis), the patient was discharged with cervical collar with plan to admit for C5 stabilization followed by local radiation and/or systemic therapy.   · Anemia - On admission 4/28/20 hemoglobin was 12.7, MCV 81.8, and RDW 16.7. On 4/29/2020 iron was 44 (), TIBC 350 (298-436), iron saturation 13 (20-50), and ferritin 115.7 (). Hemoglobin dropped to 10.9 on 5/3/20. He was started on ferrous sulfate 325mg by mouth daily. Stool heme was pending at time of discharge.      PCP: Sola Guerrero MD    Subjective  Neck pain is 1. Has not gotten out of bed today. No dizziness yet today. Having BMs which are dark colored.     ROS:  Review of Systems   Constitutional: Negative for activity change, chills, fatigue, fever and unexpected weight change.   HENT: Negative for congestion, dental problem, hearing loss, mouth sores, nosebleeds, sore throat and trouble swallowing.    Eyes: Negative for photophobia and visual disturbance.   Respiratory: Negative for cough, chest tightness and shortness of breath.    Cardiovascular: Negative for chest pain, palpitations and leg swelling.   Gastrointestinal: Negative for abdominal distention, abdominal pain, blood in stool, constipation, diarrhea, nausea and vomiting.   Endocrine: Negative for cold intolerance and heat intolerance.   Genitourinary: Negative for decreased urine volume, difficulty urinating, frequency, hematuria and urgency.   Musculoskeletal: Positive for neck pain. Negative for arthralgias and gait problem.   Skin: Negative for rash and wound.   Neurological: Negative for  "dizziness, tremors, weakness, light-headedness, numbness and headaches.   Hematological: Negative for adenopathy. Does not bruise/bleed easily.   Psychiatric/Behavioral: Negative for confusion and hallucinations. The patient is not nervous/anxious.    All other systems reviewed and are negative.       MEDICATIONS:    Scheduled Meds:    amLODIPine 10 mg Oral Daily   atenolol 25 mg Oral BID   ferrous sulfate 324 mg Oral BID With Meals   finasteride 5 mg Oral Nightly   glipizide 5 mg Oral QAM AC   insulin lispro 0-9 Units Subcutaneous TID AC   isosorbide mononitrate 60 mg Oral Daily   losartan 100 mg Oral Daily   metFORMIN ER 2,000 mg Oral Daily Before Supper   mirtazapine 15 mg Oral Nightly   pantoprazole 40 mg Oral Q AM   pregabalin 100 mg Oral Q12H   rosuvastatin 10 mg Oral Daily   sodium chloride 3 mL Intravenous Q12H      Continuous Infusions:    sodium chloride 0.45 % with KCl 20 mEq 100 mL/hr Last Rate: Stopped (05/08/20 0157)      PRN Meds:  dextrose  •  dextrose  •  glucagon (human recombinant)  •  HYDROcodone-acetaminophen  •  HYDROmorphone **AND** naloxone  •  insulin lispro **AND** insulin lispro  •  nitroglycerin  •  ondansetron  •  sodium chloride     ALLERGIES:  No Known Allergies    Objective    VITALS:   /72 (BP Location: Left arm, Patient Position: Lying)   Pulse 70   Temp 98.7 °F (37.1 °C) (Oral)   Resp 16   Ht 175.3 cm (69.02\")   Wt 82.3 kg (181 lb 7 oz)   SpO2 96%   BMI 26.78 kg/m²     PHYSICAL EXAM:  Physical Exam   Constitutional: He is oriented to person, place, and time. He appears well-developed and well-nourished. No distress.   HENT:   Head: Normocephalic and atraumatic.   Nose: Nose normal.   Mouth/Throat: Oropharynx is clear and moist. No oropharyngeal exudate.   Dentures   Eyes: Pupils are equal, round, and reactive to light. Conjunctivae and EOM are normal. No scleral icterus.   Neck: Normal range of motion. Neck supple.   3cm scar base anterior neck   Cardiovascular: Normal " rate, regular rhythm and intact distal pulses.   No murmur heard.  Monitor leads   Pulmonary/Chest: Effort normal and breath sounds normal. No respiratory distress. He has no wheezes. He has no rales.   Abdominal: Soft. Bowel sounds are normal. He exhibits no distension and no mass. There is no tenderness. There is no guarding.   Genitourinary:   Genitourinary Comments: Deferred    Musculoskeletal: Normal range of motion. He exhibits no edema, tenderness or deformity.   BUE IVs   Lymphadenopathy:     He has no cervical adenopathy.     He has no axillary adenopathy.        Right: No supraclavicular adenopathy present.        Left: No supraclavicular adenopathy present.   Neurological: He is alert and oriented to person, place, and time. Coordination normal.   Skin: Skin is warm and dry. No rash noted. He is not diaphoretic. No erythema. No pallor.   BUE ecchymosis    Psychiatric: He has a normal mood and affect. His behavior is normal. Thought content normal.   Nursing note and vitals reviewed.        RECENT LABS:  Lab Results (last 24 hours)     Procedure Component Value Units Date/Time    POC Glucose Once [[id number]]  (Abnormal) Collected:  05/11/20 0804    Specimen:  Blood Updated:  05/11/20 0805     Glucose 178 mg/dL      Comment: Serial Number: [id number]:  [id number]       CBC & Differential [[id number]] Collected:  05/11/20 0423    Specimen:  Blood Updated:  05/11/20 0454    Narrative:       The following orders were created for panel order CBC & Differential.  Procedure                               Abnormality         Status                     ---------                               -----------         ------                     CBC Auto Differential[[id number]]        Abnormal            Final result                 Please view results for these tests on the individual orders.    CBC Auto Differential [[id number]]  (Abnormal) Collected:  05/11/20 0423    Specimen:  Blood Updated:  05/11/20 0454     WBC  13.40 10*3/mm3      RBC 3.50 10*6/mm3      Hemoglobin 9.5 g/dL      Hematocrit 29.1 %      MCV 83.0 fL      MCH 27.0 pg      MCHC 32.6 g/dL      RDW 16.6 %      RDW-SD 49.4 fl      MPV 7.3 fL      Platelets 212 10*3/mm3      Neutrophil % 77.8 %      Lymphocyte % 9.5 %      Monocyte % 11.6 %      Eosinophil % 0.8 %      Basophil % 0.3 %      Neutrophils, Absolute 10.50 10*3/mm3      Lymphocytes, Absolute 1.30 10*3/mm3      Monocytes, Absolute 1.60 10*3/mm3      Eosinophils, Absolute 0.10 10*3/mm3      Basophils, Absolute 0.00 10*3/mm3      nRBC 0.0 /100 WBC     POC Glucose Once [[id number]]  (Abnormal) Collected:  05/10/20 2025    Specimen:  Blood Updated:  05/10/20 2026     Glucose 153 mg/dL      Comment: Serial Number: [id number]:  [id number]       POC Glucose Once [[id number]]  (Abnormal) Collected:  05/10/20 1707    Specimen:  Blood Updated:  05/10/20 1709     Glucose 154 mg/dL      Comment: Serial Number: [id number]:  [id number]       POC Glucose Once [[id number]]  (Abnormal) Collected:  05/10/20 1231    Specimen:  Blood Updated:  05/10/20 1232     Glucose 212 mg/dL      Comment: Serial Number: [id number]:  [id number]             PENDING RESULTS: bone path, stool heme    IMAGING REVIEWED:  No radiology results for the last day    I have reviewed the patient's labs, imaging, reports, and other clinician documentation.    Assessment/Plan   ASSESSMENT:  1. Adenocarcinoma RLL lung with metastases to bone- status post surgical stabilization of cervical spinal mets and vertebroplasty of T12/L1 spine met.  Plan to evaluate for radiation therapy.  Molecular studies on lung biopsy pending to determine systemic therapy options.   2. Anemia - Mild JULIO noted with last admit with stool heme pending at time of discharge and had started ferrous sulfate. On pantoprazole. Reordered stool heme. S/p Venofer x1 given 5/10  with postop Hgb drop. Remains on ferrous sulfate PO BID.   3. Hypercalcemia of malignancy -  Received Zometa with last infusion on 4/29.  Calcium was 8.4 on 5/8.  4. High D-dimer -incidental finding with last admission and felt likely from malignancy as patient otherwise asymptomatic.    5. Orthostatic hypotension - multifactorial postop with pain meds, decreased activity etc.  6. HTNCAD/PVD -Plavix and aspirin held for procedures.  7. DM -  Per primary care.    PLAN:  1. C-spine path pending.   2. Molecular studies pending on lung path to guide systemic treatment options.   3. Await radiation evaluation and plan.   4. Stool heme remains pending.  5. Continue oral iron twice a day.   6. Resume ASA and Plavix or add prophy Lovenox when ok with neurosurgery.  7. Plan outpatient Xgeva for bone met support.      Proper PPE worn given coronavirus pandemic.     I have personally performed a face-to-face diagnostic evaluation via telehealth on this patient.  I have discussed the case with Isidro Perdue NP, have edited/reviewed the note, and agree with the care plan.  Neck pain is under better control and on examination he does have a scar on his anterior neck.  Labs are significant for an anemia.  For the lung cancer with metastasis, the plan is for radiation to the spine followed by systemic therapy.          I discussed the patients findings and my recommendations with patient and consulting provider    Fili Cordova MD  05/11/20  09:10

## 2020-05-11 NOTE — PROGRESS NOTES
LOS: 4 days   Patient Care Team:  Sola Guerrero MD as PCP - General  Jeremi Blandon MD as PCP - Claims Attributed  Pj Hough MD as Consulting Physician (Cardiology)  Fili Cordova MD as Consulting Physician (Hematology and Oncology)    Chief Complaint: Incisional pain    Subjective     Interval History:   POD #4 C5 corpectomy with cage and plate, C3-T3 instrumented fusion, T12 and L1 vertebroplasty  He states he is doing well, and ambulating with walker.  Patient states all of his preop neck pain is much better, experiencing some cervical incisional pain  Patient states that his appetite has not been good last couple of days.   Hemovac drain still in place      Objective     Vital Signs  Temp:  [98.5 °F (36.9 °C)-99.9 °F (37.7 °C)] 98.7 °F (37.1 °C)  Heart Rate:  [62-79] 70  Resp:  [14-16] 16  BP: (112-159)/(61-72) 159/72    Physical Exam:  Alert and oriented by 3  Speech is intact and coherent articulate with good content and production  Cranial nerves III through XII are grossly intact with pupils symmetric and reactive and no gaze paresis or nystagmus  Sensation is intact to soft touch and pinprick  in both upper and lower extremities  Motor strength is 5/5 in both upper and lower extremities with no focal motor deficits  Reflexes are 2+ in both upper and lower extremities with no upper motor neuron signs  Gait not assessed  No evidence of DVT  Dressing removed, incision  clean, dry, and intact and approximated with staples.  No swelling, edema, redness noted.     Results Review:     I reviewed the patient's new clinical results.    Hemoglobin 9.5    Medication Review: Reviewed    Assessment/Plan       Metastatic cancer (CMS/HCC)    Patient ambulating well using a walker.  Patient ambulated 4 times around nursing station last shift.   Patient pain controlled with oral pain medication  Hemovac drain by Dr. Angel  Discussed with patient the importance of maintaining good nutrition and  increase protein intake for healing and overall health.  Patient is agreeable to nutritional shakes 3 times a day.  Patient would like to go home with home health for rehabilitation.  We will wait on physical therapy's evaluation today for possible discharge this afternoon.    Dr. Angel was present on rounding and agrees with plan.    This patient was examined wearing appropriate personal protective equipment.       Plan for disposition:Where: home and home health and When:  today    RON Cabrera  05/11/20  09:52

## 2020-05-11 NOTE — THERAPY TREATMENT NOTE
Patient Name: Clemente Salinas  : 1945    MRN: [id number]                              Today's Date: 2020       Admit Date: 2020    Visit Dx:     ICD-10-CM ICD-9-CM   1. 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)   • Neuropathy   • Atheroscler of native artery of both legs with intermit claudication (CMS/HCC)   • Malignant neoplasm of right lung (CMS/HCC)   • Metastatic cancer (CMS/HCC)     Past Medical History:   Diagnosis Date   • BPH (benign prostatic hyperplasia)    • CAD (coronary artery disease)    • Cancer (CMS/HCC)     lung ca   • DM type 2 (diabetes mellitus, type 2) (CMS/HCC)    • HLD (hyperlipidemia)    • HTN (hypertension)    • Myocardial infarction (CMS/HCC)    • Peripheral neuropathy    • Vitamin D deficiency 2010     Past Surgical History:   Procedure Laterality Date   • BRONCHOSCOPY N/A 2020    Procedure: BRONCHOSCOPY WITH BRONCHOALVEOLAR LAVAGE AND NAVIGATION WITH FINE NEEDLE ASPIRATION, BRUSHING, AND BIOPSY X1 AREA AND ULTRASOUND WITH FINE NEEDLE ASPIRATION;  Surgeon: Jeb Velasquez MD;  Location: [facility] ENDOSCOPY;  Service: Pulmonary;  Laterality: N/A;  RLL mass   • CHOLECYSTECTOMY     • CORONARY ANGIOPLASTY WITH STENT PLACEMENT  2013    LAD   • CORONARY ARTERY BYPASS GRAFT  2001   • CORONARY STENT PLACEMENT  2013   • FEMORAL ARTERY STENT  2019     General Information     Row Name 20 1204          PT Evaluation Time/Intention    Document Type  therapy note (daily note)  -CR     Mode of Treatment  physical  therapy  -CR     Row Name 05/11/20 1204          Cognitive Assessment/Intervention- PT/OT    Orientation Status (Cognition)  oriented x 3  -CR     Row Name 05/11/20 1204          Safety Issues, Functional Mobility    Impairments Affecting Function (Mobility)  pain  -CR       User Key  (r) = Recorded By, (t) = Taken By, (c) = Cosigned By    Initials Name Provider Type    CR Reyes, Carmela, YISSEL Physical Therapist        Mobility     Row Name 05/11/20 1204          Bed Mobility Assessment/Treatment    Rolling Left [place] (Bed Mobility)  supervision;verbal cues log roll technique  -CR     Supine-Sit [place] (Bed Mobility)  supervision  -CR     Row Name 05/11/20 1204          Transfer Assessment/Treatment    Comment (Transfers)  had pt apply cervical collar sitting EOB with bedside mirror  -CR     Row Name 05/11/20 1204          Sit-Stand Transfer    Sit-Stand [place] (Transfers)  contact guard  -CR     Assistive Device (Sit-Stand Transfers)  walker, front-wheeled  -CR     Row Name 05/11/20 1204          Gait/Stairs Assessment/Training    Gait/Stairs Assessment/Training  gait/ambulation assistive device  -CR     [place] Level (Gait)  supervision  -CR     Assistive Device (Gait)  walker, front-wheeled  -CR     Distance in Feet (Gait)  150 ft  -CR     Pattern (Gait)  step-through  -CR       User Key  (r) = Recorded By, (t) = Taken By, (c) = Cosigned By    Initials Name Provider Type    CR Reyes, Carmela, PT Physical Therapist        Obj/Interventions     Row Name 05/11/20 1217          Static Sitting Balance    Level of [place] (Unsupported Sitting, Static Balance)  conditional independence  -CR     Sitting Position (Unsupported Sitting, Static Balance)  sitting on edge of bed  -CR     Time Able to Maintain Position (Unsupported Sitting, Static Balance)  1 to 2 minutes  -CR     Row Name 05/11/20 1217          Dynamic Sitting Balance    Level of [place], Reaches Outside Midline (Sitting,  Dynamic Balance)  supervision  -CR     Sitting Position, Reaches Outside Midline (Sitting, Dynamic Balance)  sitting on edge of bed  -CR     Row Name 05/11/20 1217          Static Standing Balance    Level of [place] (Supported Standing, Static Balance)  supervision  -CR     Time Able to Maintain Position (Supported Standing, Static Balance)  2 to 3 minutes  -CR     Assistive Device Utilized (Supported Standing, Static Balance)  walker, rolling  -CR     Row Name 05/11/20 1217          Dynamic Standing Balance    Level of [place], Reaches Outside Midline (Standing, Dynamic Balance)  supervision  -CR     Time Able to Maintain Position, Reaches Outside Midline (Standing, Dynamic Balance)  4 to 5 minutes  -CR     Assistive Device Utilized (Supported Standing, Dynamic Balance)  walker, rolling  -CR       User Key  (r) = Recorded By, (t) = Taken By, (c) = Cosigned By    Initials Name Provider Type    CR Reyes, Carmela, PT Physical Therapist        Goals/Plan     Row Name 05/11/20 1256          Transfer Goal 1 (PT)    Progress/Outcome (Transfer Goal 1, PT)  good progress toward goal  -CR     Row Name 05/11/20 1256          Gait Training Goal 1 (PT)    Distance (Gait Goal 1, PT)  300 ft  -CR     Progress/Outcome (Gait Training Goal 1, PT)  good progress toward goal;goal revised this date  -CR       User Key  (r) = Recorded By, (t) = Taken By, (c) = Cosigned By    Initials Name Provider Type    CR Reyes, Carmela, PT Physical Therapist        Clinical Impression     Row Name 05/11/20 1218          Pain Assessment    Additional Documentation  Pain Scale: Numbers Pre/Post-Treatment (Group)  -CR     Row Name 05/11/20 1218          Pain Scale: Numbers Pre/Post-Treatment    Pain Scale: Numbers, Pretreatment  4/10  -CR     Pain Scale: Numbers, Post-Treatment  4/10  -CR     Pain Location - Orientation  incisional  -CR     Pain Location  neck  -CR     Row Name 05/11/20 1218          Plan of Care Review    Plan of Care  Reviewed With  patient  -CR     Progress  improving  -CR     Outcome Summary  Patient much improved; now only requiring verbal cues for supine to sit transfer and supervision to ambulate 150 ft without loss of balance, no gait deficits noted. Pt still with gen weakness and unable to ambulate without a.d. however pt claims he has rw at home. At this time, pt is safe to return home with assist and HH PT to f/u for home assessment and family training. No report of dizziness or inc pain reported.   -CR     Row Name 05/11/20 1218          Positioning and Restraints    Pre-Treatment Position  in bed  -CR     Post Treatment Position  chair  -CR     In Chair  notified nsg;reclined;exit alarm on;call light within reach  -CR       User Key  (r) = Recorded By, (t) = Taken By, (c) = Cosigned By    Initials Name Provider Type    CR Reyes, Carmela, PT Physical Therapist        Outcome Measures     Row Name 05/11/20 1257          How much help from another person do you currently need...    Turning from your back to your side while in flat bed without using bedrails?  4  -CR     Moving from lying on back to sitting on the side of a flat bed without bedrails?  4  -CR     Moving to and from a bed to a chair (including a wheelchair)?  4  -CR     Standing up from a chair using your arms (e.g., wheelchair, bedside chair)?  4  -CR     Climbing 3-5 steps with a railing?  3  -CR     To walk in hospital room?  3  -CR     AM-PAC 6 Clicks Score (PT)  22  -CR       User Key  (r) = Recorded By, (t) = Taken By, (c) = Cosigned By    Initials Name Provider Type    CR Reyes, Carmela, PT Physical Therapist        Physical Therapy Education                 Title: PT OT SLP Therapies (Done)     Topic: Physical Therapy (Done)     Point: Mobility training (Done)     Description:   Instruct learner(s) on safety and technique for assisting patient out of bed, chair or wheelchair.  Instruct in the proper use of assistive devices, such as walker, crutches,  cane or brace.              Patient Friendly Description:   It's important to get you on your feet again, but we need to do so in a way that is safe for you. Falling has serious consequences, and your personal safety is the most important thing of all.        When it's time to get out of bed, one of us or a family member will sit next to you on the bed to give you support.     If your doctor or nurse tells you to use a walker, crutches, a cane, or a brace, be sure you use it every time you get out of bed, even if you think you don't need it.    Learning Progress Summary           Patient Acceptance, E, VU,NR by  at 5/11/2020 1257    Acceptance, E,TB, VU,DU by  at 5/11/2020 0253    Acceptance, E,TB, VU by  at 5/10/2020 1124    Acceptance, E,TB, VU by  at 5/10/2020 1032    Acceptance, E, DU,NR by SC at 5/9/2020 0933    Acceptance, E,TB, VU,DU by [facility] at 5/8/2020 1601                   Point: Precautions (Done)     Description:   Instruct learner(s) on prescribed precautions during mobility and gait tasks              Learning Progress Summary           Patient Acceptance, E, VU,NR by  at 5/11/2020 1257    Acceptance, E,TB, VU,DU by  at 5/11/2020 0253    Acceptance, E,TB, VU by  at 5/10/2020 1124    Acceptance, E,TB, VU by  at 5/10/2020 1032    Acceptance, E, DU,NR by SC at 5/9/2020 0933    Acceptance, E,TB, VU,DU by [facility] at 5/8/2020 1601                               User Key     Initials Effective Dates Name Provider Type Discipline     03/01/19 -  Danyell Scott, PT Physical Therapist PT    [facility] 03/01/19 -  Deidre Moscoso, PT Physical Therapist PT     03/01/19 -  Reyes, Carmela, PT Physical Therapist PT    [facility] 03/01/19 -  Caren Caldera, PTA Physical Therapy Assistant PT     06/24/19 -  Pili Carlos LPN Licensed Nurse Nurse     03/11/20 -  Shanell Sorto, RN Registered Nurse Nurse              PT Recommendation and Plan     Outcome Summary/Treatment Plan (PT)  Anticipated Discharge  Disposition (PT): home with home health, home with assist  Plan of Care Reviewed With: patient  Progress: improving  Outcome Summary: Patient much improved; now only requiring verbal cues for supine to sit transfer and supervision to ambulate 150 ft without loss of balance, no gait deficits noted. Pt still with gen weakness and unable to ambulate without a.d. however pt claims he has rw at home. At this time, pt is safe to return home with assist and HH PT to f/u for home assessment and family training. No report of dizziness or inc pain reported.      Time Calculation:   PT Charges     Row Name 05/11/20 1258             Time Calculation    Start Time  0855  -CR      Stop Time  0935  -CR      Time Calculation (min)  40 min  -CR      PT Received On  05/11/20  -CR      PT - Next Appointment  05/12/20  -CR         Time Calculation- PT    Total Timed Code Minutes- PT  40 minute(s)  -CR        User Key  (r) = Recorded By, (t) = Taken By, (c) = Cosigned By    Initials Name Provider Type    CR Reyes, Carmela, PT Physical Therapist        Therapy Charges for Today     Code Description Service Date Service Provider Modifiers Qty    93133948073 HC PT THERAPEUTIC ACT EA 15 MIN 5/11/2020 Reyes, Carmela, PT GP 2    67591063749 HC GAIT TRAINING EA 15 MIN 5/11/2020 Reyes, Carmela, PT GP 1          PT G-Codes  Outcome Measure Options: AM-PAC 6 Clicks Basic Mobility (PT)  AM-PAC 6 Clicks Score (PT): 22    Carmela Reyes, PT  5/11/2020

## 2020-05-12 NOTE — TELEPHONE ENCOUNTER
PATIENT CALLED TO SCHEDULE AN POST OP APPT. I TRIED TO WARM TRANSFER WITH NO ONE ANSWERING.  PATIENT ALSO HAD QUESTIONS ABOUT HIS MEDS THAT HE WAS TAKING BEFORE HIS SURGERY AND WHAT HE SHOULD TAKE NOW.  PLEASE CALL AND SCHEDULE POST OP AND ANSWER ANY QUESTIONS OR CONCERNS.    [phone]

## 2020-05-12 NOTE — PROGRESS NOTES
Case Management Discharge Note      Final Note: Home with [facility]     Provided Post Acute Provider List?: Yes  Post Acute Provider List: Home Health  Delivered To: Support Person  Support Person: yun Bonilla  Method of Delivery: Telephone          Home Medical Care      Service Provider Request Status Selected Services Address Phone Number Fax Number    [facility] [address] [phone]                Final Discharge Disposition Code: 06 - home with home health care

## 2020-05-12 NOTE — OUTREACH NOTE
Prep Survey      Responses   [facility]?  [facility]   Is LACE score < 7 ?  No   Eligibility  Readm Mgmt   Discharge diagnosis  Metastatic cancer , POSTERIOR CERVICAL FUSION, C5 VERTEBRECTOMY C5 AND PLACEMENT OF ANTERIOR CAGE C3-T3 POSTERIOR FUSION T12,   COVID-19 Test Status  Negative   Does the patient have one of the following disease processes/diagnoses(primary or secondary)?  General Surgery   Does the patient have Home health ordered?  Yes   What is the Home health agency?   [facility]   Is there a DME ordered?  No   Prep survey completed?  Yes          Marylou Mg RN

## 2020-05-13 NOTE — TELEPHONE ENCOUNTER
PT CALLED AND MISSED THE CALL FROM THE OFFICE AND WOULD LIKE FOR SOMEONE TO CALL THEM BACK ABOUT POST OP APPT. TRIED TO WARM TRANSFER AND NO ONE WAS AVAILABLE    PT CONTACT# [phone]

## 2020-05-14 NOTE — ANESTHESIA POSTPROCEDURE EVALUATION
Patient: Clemente Salinas    Procedure Summary     Date:  05/07/20 Room / Location:  [facility] OR  / [facility] MAIN OR    Anesthesia Start:  1141 Anesthesia Stop:  1755    Procedures:       POSTERIOR CERVICAL FUSION, C5 VERTEBRECTOMY C5 AND PLACEMENT OF ANTERIOR CAGE C3-T3 POSTERIOR FUSION T12, (N/A Spine Cervical)      CERVICAL DISCECTOMY ANTERIOR WITH FUSION (N/A Spine Cervical)      VERTEBROPLASTY LUMBAR 1 (N/A ) Diagnosis:       Metastatic cancer (CMS/HCC)      (Metastatic cancer (CMS/HCC) [C79.9])    Surgeon:  Pawel Angel MD Provider:  Sandra Betancourt MD    Anesthesia Type:  general ASA Status:  3          Anesthesia Type: general    Vitals  Vitals Value Taken Time   /63 5/7/2020  6:55 PM   Temp 97.5 °F (36.4 °C) 5/7/2020  6:55 PM   Pulse 89 5/7/2020  6:55 PM   Resp 13 5/7/2020  6:55 PM   SpO2 100 % 5/7/2020  6:55 PM           Post Anesthesia Care and Evaluation    Patient location during evaluation: PACU  Patient participation: complete - patient participated  Level of consciousness: awake  Pain score: 0 (See nurse's notes for pain score)  Pain management: adequate  Airway patency: patent  Anesthetic complications: No anesthetic complications  PONV Status: none  Cardiovascular status: acceptable  Respiratory status: acceptable  Hydration status: acceptable    Comments: Patient seen and examined postoperatively; vital signs stable; SpO2 greater than or equal to 90%; cardiopulmonary status stable; nausea/vomiting adequately controlled; pain adequately controlled; no apparent anesthesia complications; patient discharged from anesthesia care when discharge criteria were met

## 2020-05-14 NOTE — OUTREACH NOTE
General Surgery Week 1 Survey      Responses   [facility] patient discharged from?  [facility]   Does the patient have one of the following disease processes/diagnoses(primary or secondary)?  General Surgery   Is there a successful TCM telephone encounter documented?  No   Week 1 attempt successful?  Yes   Call start time  1020   Rescheduled  Rescheduled-pt requested   Call end time  1020   Discharge diagnosis  Metastatic cancer , POSTERIOR CERVICAL FUSION, C5 VERTEBRECTOMY C5 AND PLACEMENT OF ANTERIOR CAGE C3-T3 POSTERIOR FUSION T12,          Jamila Wilson RN

## 2020-05-18 NOTE — OUTREACH NOTE
General Surgery Week 1 Survey      Responses   [facility] patient discharged from?  [facility]   Does the patient have one of the following disease processes/diagnoses(primary or secondary)?  General Surgery   Is there a successful TCM telephone encounter documented?  No   Week 1 attempt successful?  Yes   Call start time  1639   Call end time  1642   Is patient permission given to speak with other caregiver?  Yes   List who call center can speak with  wife   Person spoke with today (if not patient) and relationship  wife   Meds reviewed with patient/caregiver?  Yes   Is the patient having any side effects they believe may be caused by any medication additions or changes?  No   Does the patient have all medications related to this admission filled (includes all antibiotics, pain medications, etc.)  Yes   Is the patient taking all medications as directed (includes completed medication regime)?  Yes   Does the patient have a follow up appointment scheduled with their surgeon?  Yes   Has the patient kept scheduled appointments due by today?  N/A   Comments  appts. starts Thursday   What is the Home health agency?   [facility]   Psychosocial issues?  No   Comments  Has not started tx. had port put in today, has sore in his mouth, has meds. HH comes.    Did the patient receive a copy of their discharge instructions?  Yes   Nursing interventions  Reviewed instructions with patient   What is the patient's perception of their health status since discharge?  Improving   Nursing interventions  Nurse provided patient education   Is the patient /caregiver able to teach back basic post-op care?  Practice 'cough and deep breath', No tub bath, swimming, or hot tub until instructed by MD, Keep incision areas clean,dry and protected, Do not remove steri-strips, Drive as instructed by MD in discharge instructions, Take showers only when approved by MD-sponge bathe until then, Lifting as instructed by MD in discharge instructions   Is  the patient/caregiver able to teach back signs and symptoms of incisional infection?  Increased redness, swelling or pain at the incisonal site, Increased drainage or bleeding, Incisional warmth, Pus or odor from incision, Fever   Is the patient/caregiver able to teach back steps to recovery at home?  Set small, achievable goals for return to baseline health, Rest and rebuild strength, gradually increase activity, Practice good oral hygiene, Eat a well-balance diet   Is the patient/caregiver able to teach back the hierarchy of who to call/visit for symptoms/problems? PCP, Specialist, Home health nurse, Urgent Care, ED, 911  Yes   Week 1 call completed?  Yes   Wrap up additional comments  He is not feeling well today, sores in mouth, has meds for this.           Rosa Díaz, RN

## 2020-05-18 NOTE — PROGRESS NOTES
Subjective   Clemente Salinas is a 74 y.o. male.     Chief Complaint   Patient presents with   • Post-op     There were no vitals taken for this visit.    You have chosen to receive care through a telephone visit. Do you consent to use a telephone visit for your medical care today? Yes    History of Present Illness:     Clemente Salinas is a 74-year-old male that I am seeing for a postoperative follow-up visit.  There was some confusion of visit type and day.  I did go ahead and conduct the telehealth visit and patient will come in in the next couple of days for staple removal and follow-up with Dr. Angel.. I Have reviewed his records.  Patient underwent C5 corpectomy with cage and plate, C3-T3 instrumented fusion and T12, L1 vertebroplasty on 5/7/2020 with Dr. Angel.    Patient's wife was present during the telehealth visit. Patient denies any pain at this time and is still awaiting physical therapy through home health.   Patient's incisions are clean dry and intact, with no redness, swelling noted.  Patient denies any leg swelling, or shortness of air.  Patient is complained of numerous sores in his mouth of unknown etiology.  He has been prescribed some Magic mouthwash by his oncologist.  Patient is now seeing [facility] physicians for his metastatic adenocarcinoma and saw Dr. Eliezer Gonzalez on 5/14/2020.     Patient presented  to [facility] ED with back and shoulder pain on 5/5/2020. Imaging demonstrated a newly found RLL mass. Additional imaging ordered and show numerous mets throughout spine. Pathology consistent with adenocarcinoma.  Molecular testing is pending.   Pt will undergo staged radiation and adjuvant chemotherapy.      The following portions of the patient's history were reviewed and updated as appropriate: allergies, current medications, past family history, past medical history, past social history, past surgical history and problem list.    Review of Systems   HENT: Positive for mouth sores.     Eyes: Negative.    Respiratory: Negative for cough and shortness of breath.    Cardiovascular: Negative for chest pain and leg swelling.   Gastrointestinal: Negative.    Genitourinary: Negative for urinary incontinence.   Musculoskeletal: Negative for back pain and neck pain.   Neurological: Negative for weakness and numbness.        All other systems reviewed and are negative.      The  following portions of the patient's history were reviewed and updated as appropriate:   Allergies, current medications, past family history, past medical history, past social history, past surgical history and problem list.     Past Surgical History:   Procedure Laterality Date   • BRONCHOSCOPY N/A 2020    Procedure: BRONCHOSCOPY WITH BRONCHOALVEOLAR LAVAGE AND NAVIGATION WITH FINE NEEDLE ASPIRATION, BRUSHING, AND BIOPSY X1 AREA AND ULTRASOUND WITH FINE NEEDLE ASPIRATION;  Surgeon: Jeb Velasquez MD;  Location: [facility] ENDOSCOPY;  Service: Pulmonary;  Laterality: N/A;  RLL mass   • CHOLECYSTECTOMY     • CORONARY ANGIOPLASTY WITH STENT PLACEMENT  2013    LAD   • CORONARY ARTERY BYPASS GRAFT  2001   • CORONARY STENT PLACEMENT  2013   • FEMORAL ARTERY STENT  2019     Past Medical History:   Diagnosis Date   • BPH (benign prostatic hyperplasia)    • CAD (coronary artery disease)    • Cancer (CMS/HCC)     lung ca   • DM type 2 (diabetes mellitus, type 2) (CMS/HCC)    • HLD (hyperlipidemia)    • HTN (hypertension)    • Myocardial infarction (CMS/HCC)    • Peripheral neuropathy    • Vitamin D deficiency 2010     Social History     Socioeconomic History   • Marital status:      Spouse name: Not on file   • Number of children: Not on file   • Years of education: Not on file   • Highest education level: Not on file   Tobacco Use   • Smoking status: Former Smoker     Last attempt to quit: 1981     Years since quittin.4   • Smokeless tobacco: Never Used   Substance and Sexual Activity   • Alcohol use:  No   • Drug use: No   • Sexual activity: Defer      Family History   Problem Relation Age of Onset   • Heart disease Father    • Heart attack Father 62   • Heart attack Paternal Grandmother 72   • Heart disease Paternal Grandmother             Objective:         Diagnosis and Orders:    Patient passed will call later today or tomorrow and schedule appointment for staple removal and follow-up visit in office with Dr. Angel either Thursday or Friday of this week.      Problems Addressed this Visit        Other    Metastatic cancer (CMS/HCC)    S/P cervical spinal fusion - Primary      Other Visit Diagnoses     S/P vertebroplasty        Atherosclerosis of native artery of left lower extremity with intermittent claudication (CMS/HCC)                I have spent 15 minutes in direct communication with patient discussing postoperative expectations and directions.       Marylou Voss, APRN  05/18/20  14:22

## 2020-05-22 NOTE — PROGRESS NOTES
Patient's wife called approximately 30 minutes prior to patient's scheduled 11:00 appointment for staple removal to say that they would not be making the appointment due to patient currently being on the floor due to a hypotensive syncopal episode.  Patient was reported not to have fallen but helped to floor by his grandson.  Ms. Salinas also stated that patient had recently been admitted to [facility] on 5/19/2020 per recommendations of his radiation oncology provider for low blood pressure. Patient was found to be suffering from dehydration and was treated with intravenous fluids and discharged home on yesteday with normalized blood pressure.  Ms. Salinas also stated that he had imaging on his cervical neck while at the hospital due to some redness and swelling at his incision site.  I inquired as to whether the incision site had been draining and she replied no.  She had not called the office to let us know about the findings she said because he was scheduled to come in for follow up visit with staple removal today and thought we could evaluate the swelling at this time.   A further inspection of outside facility records found that a CT of the neck was performed on 5/20/2020 and demonstrated a  large midline posterior seroma/hematoma extending from approximately the C5-T4 level with no obvious signs of infection.  I advised Ms. Salinas to take patient to the emergency room to be reevaluated immediately.  Ms. Salinas acknowledged and states that they will be going to [facility] as that is where patient's oncology and rad/onc team is located.   I also told Ms. Salinas that we would be in contact with her regarding patient's status.  Ms. Salinas acknowledged.

## 2020-05-25 NOTE — ED PROVIDER NOTES
Subjective   Chief complaint: Patient is a 74-year-old who presented in full cardiopulmonary arrest.  He was witnessed by his wife somewhere between 630 and 640 to wake up holding his hands up in the air clenching his fists.  He took for deep breaths and then went unresponsive.  Wife called EMS.  Patient was transported in route by EMS they were on the scene at 6:41 AM starting ACLS.  Patient has had I gel airway in place and multiple epinephrine in route.  There was first seen to be V. fib and the patient received 1 defibrillation.  After that it is been asystole in the hallway.  Patient was recently diagnosed with metastatic lung cancer.  He also had pulmonary emboli recently diagnosed here couple weeks ago.    Context: Patient is a cancer patient.  He has history of pulmonary embolism.  He recently had a spine surgery a few weeks ago.    Duration: As above.  We are over 30 minutes into ACLS here in the trauma bay    Timing: As above    Severity: Severe    Associated Symptoms: Unable to obtain.  Appropriate PPE was used.        PCP:  LMP:          Review of Systems   Unable to perform ROS: Patient unresponsive       Past Medical History:   Diagnosis Date   • BPH (benign prostatic hyperplasia)    • CAD (coronary artery disease)    • Cancer (CMS/HCC)     lung ca   • DM type 2 (diabetes mellitus, type 2) (CMS/HCC) 2001   • HLD (hyperlipidemia)    • HTN (hypertension) 2003   • Myocardial infarction (CMS/HCC)    • Peripheral neuropathy    • Vitamin D deficiency 12/2010       No Known Allergies    Past Surgical History:   Procedure Laterality Date   • ANTERIOR CERVICAL DISCECTOMY W/ FUSION N/A 5/7/2020    Procedure: CERVICAL DISCECTOMY ANTERIOR WITH FUSION;  Surgeon: Pawel Angel MD;  Location: [facility] MAIN OR;  Service: Neurosurgery;  Laterality: N/A;   • BRONCHOSCOPY N/A 5/4/2020    Procedure: BRONCHOSCOPY WITH BRONCHOALVEOLAR LAVAGE AND NAVIGATION WITH FINE NEEDLE ASPIRATION, BRUSHING, AND BIOPSY X1 AREA AND  ULTRASOUND WITH FINE NEEDLE ASPIRATION;  Surgeon: Jeb Velasquez MD;  Location: [facility] ENDOSCOPY;  Service: Pulmonary;  Laterality: N/A;  RLL mass   • CHOLECYSTECTOMY     • CORONARY ANGIOPLASTY WITH STENT PLACEMENT  2013    LAD   • CORONARY ARTERY BYPASS GRAFT  2001   • CORONARY STENT PLACEMENT  2013   • FEMORAL ARTERY STENT  2019   • POSTERIOR CERVICAL FUSION N/A 2020    Procedure: POSTERIOR CERVICAL FUSION, C5 VERTEBRECTOMY C5 AND PLACEMENT OF ANTERIOR CAGE C3-T3 POSTERIOR FUSION T12,;  Surgeon: Pawel Angel MD;  Location: [facility] MAIN OR;  Service: Neurosurgery;  Laterality: N/A;   • VERTEBROPLASTY N/A 2020    Procedure: VERTEBROPLASTY LUMBAR 1;  Surgeon: Pawel Angel MD;  Location: [facility] MAIN OR;  Service: Neurosurgery;  Laterality: N/A;       Family History   Problem Relation Age of Onset   • Heart disease Father    • Heart attack Father 62   • Heart attack Paternal Grandmother 72   • Heart disease Paternal Grandmother        Social History     Socioeconomic History   • Marital status:      Spouse name: Not on file   • Number of children: Not on file   • Years of education: Not on file   • Highest education level: Not on file   Tobacco Use   • Smoking status: Former Smoker     Last attempt to quit: 1981     Years since quittin.4   • Smokeless tobacco: Never Used   Substance and Sexual Activity   • Alcohol use: No   • Drug use: No   • Sexual activity: Defer           Objective   Physical Exam   Constitutional:   Cachectic   HENT:   Head: Normocephalic and atraumatic.   Eyes:   Fixed dilated pupils.   Cardiovascular:   Patient has pulse only with CPR.   Pulmonary/Chest:   Patient is being bagged through an eye gel.   Abdominal: Soft.   Musculoskeletal:   I/O placed in left upper extremity   Skin:   Pallor   Nursing note and vitals reviewed.      Procedures           ED Course         See note.  ACLS was continued in route.  Never obtained pulse.  No blood test or  further testing was indicated this time of death was called at 7:16 AM.                                  MDM  Number of Diagnoses or Management Options  Diagnosis management comments: Patient has had over 35 minutes of ACLS.  Here in the trauma bay he remains asystole.  He has had multiple bicarb here epinephrine given CPR continued.  He has been in asystole the whole time.  He remains pulseless with no respirations and no heartbeat.  His eyes were fixed.  At 7:16AM time of death was pronounced.  I did discuss with the wife the patient's death.    Patient Progress  Patient progress: other (comment)      Final diagnoses:   None     Cardiopulmonary arrest unsuccessful       Orlando Grant, DO  05/25/20 0747       Orlando Grant, DO  05/25/20 0749

## 2020-05-26 ENCOUNTER — READMISSION MANAGEMENT (OUTPATIENT)
Dept: CALL CENTER | Facility: HOSPITAL | Age: 75
End: 2020-05-26

## 2020-05-26 NOTE — OUTREACH NOTE
General Surgery Week 2 Survey      Responses   [facility] patient discharged from?  [facility]   Does the patient have one of the following disease processes/diagnoses(primary or secondary)?  General Surgery   Week 2 attempt successful?  No   Revoke  Patient           Birdie Kim RN

## 2020-05-27 LAB
LAB AP CASE REPORT: NORMAL
LAB AP FOUNDATION ONE, ADDENDUM: NORMAL
PATH REPORT.FINAL DX SPEC: NORMAL
PATH REPORT.GROSS SPEC: NORMAL

## 2020-06-01 LAB — FUNGUS WND CULT: NORMAL

## 2020-06-15 LAB
MYCOBACTERIUM SPEC CULT: NORMAL
NIGHT BLUE STAIN TISS: NORMAL

## (undated) DEVICE — Device: Brand: BALLOON

## (undated) DEVICE — CODMAN® SURGICAL PATTIES 1/2" X 1/2" (1.27CM X 1.27CM): Brand: CODMAN®

## (undated) DEVICE — UNDYED BRAIDED (POLYGLACTIN 910), SYNTHETIC ABSORBABLE SUTURE: Brand: COATED VICRYL

## (undated) DEVICE — BRSH CYTO INREACH SHEATH 1.8X2MM 130CM

## (undated) DEVICE — SYR LL TP 10ML STRL

## (undated) DEVICE — CUFF SCD HEMOFORCE SEQ CALF STD MD

## (undated) DEVICE — 3M™ IOBAN™ 2 ANTIMICROBIAL INCISE DRAPE 6650EZ: Brand: IOBAN™ 2

## (undated) DEVICE — GLV SURG DERMASSURE GRN LF PF 8.5

## (undated) DEVICE — C-ARM: Brand: UNBRANDED

## (undated) DEVICE — NDL HYPO PRECISIONGLIDE REG 22G 1 1/2

## (undated) DEVICE — ACCESS CANNULA: Brand: IVAS

## (undated) DEVICE — DRSNG SLVR/ANTIBAC PRIMASEAL POST/OP ADHS 3.5X10IN

## (undated) DEVICE — GLV SURG SENSICARE PI PF LF 7 GRN STRL

## (undated) DEVICE — GAUZE,SPONGE,4"X4",16PLY,XRAY,STRL,LF: Brand: MEDLINE

## (undated) DEVICE — SPONGE: SPECIALTY CHERRY DISS XR 100/CS: Brand: MEDICAL ACTION INDUSTRIES

## (undated) DEVICE — PK MINOR LAPAROTOMY 50

## (undated) DEVICE — PROXIMATE RH ROTATING HEAD SKIN STAPLERS (35 WIDE) CONTAINS 35 STAINLESS STEEL STAPLES: Brand: PROXIMATE

## (undated) DEVICE — GLV SURG SIGNATURE ESSENTIAL PF LTX SZ6.5

## (undated) DEVICE — PK BASIC SPINE 50

## (undated) DEVICE — DECANTER: Brand: UNBRANDED

## (undated) DEVICE — NDL HYPO PRECISIONGLIDE REG 25G 1 1/2

## (undated) DEVICE — Device

## (undated) DEVICE — SUT MNCRYL 3/0 Y936H

## (undated) DEVICE — SOL IRRIG NACL 9PCT 1000ML BTL

## (undated) DEVICE — 400ML COMPACT EVACUATOR KIT, 1/8" PVC WITH TROCAR: Brand: HEMOVAC® WOUND DRAINAGE SYSTEM

## (undated) DEVICE — KT ASP VIZISHOT 5SYRG 5BIOPSY/VLV 22G

## (undated) DEVICE — BAPTIST FLOYD BRONCHOSCOPY: Brand: MEDLINE INDUSTRIES, INC.

## (undated) DEVICE — DRAPE,LAPAROTOMY,PCH,STERILE: Brand: MEDLINE

## (undated) DEVICE — PRESSURE TUBING: Brand: TRUWAVE

## (undated) DEVICE — CATH IV INSYTE AUTOGARD 14G 1 1/2IN ORNG

## (undated) DEVICE — MAJ-1414 SINGLE USE ADPATER BIOPSY VALV: Brand: SINGLE USE ADAPTOR BIOPSY VALVE

## (undated) DEVICE — ELECTRD BLD EZ CLN MOD XLNG 2.75IN

## (undated) DEVICE — 3M™ STERI-STRIP™ REINFORCED ADHESIVE SKIN CLOSURES, R1547, 1/2 IN X 4 IN (12 MM X 100 MM), 6 STRIPS/ENVELOPE: Brand: 3M™ STERI-STRIP™

## (undated) DEVICE — ELECTRD BLD EZ CLN STD 6.5IN

## (undated) DEVICE — DRAPE SHEET ULTRAGARD: Brand: MEDLINE

## (undated) DEVICE — SUT VICYL 2/0 CR8 18IN DYED J726D

## (undated) DEVICE — DRP C/ARMOR

## (undated) DEVICE — GLV SURG SIGNATURE ESSENTIAL PF LTX SZ8

## (undated) DEVICE — ORG INST STRIP/TS ADHS 2X10IN YEL STRL

## (undated) DEVICE — NDL PULM SUPERDIMENSION ARCPOINT 18G

## (undated) DEVICE — MARKR SKIN W/RULR

## (undated) DEVICE — ADAPT SWVL FIBROPTIC BRONCH

## (undated) DEVICE — SUT VIC 0 CT2 CR8 18IN DYED J727D

## (undated) DEVICE — PTCH SENSR INREACH PULM GUIDANCE

## (undated) DEVICE — SUT MONOCRYL 4/0 PS2 27IN Y426H ETY426H

## (undated) DEVICE — MAYFIELD® DISPOSABLE ADULT SKULL PIN (PLASTIC BASE): Brand: MAYFIELD®

## (undated) DEVICE — KT BRONCH NAV EDGE 180D EXT WO/ ADAPT OLYMPUS

## (undated) DEVICE — CEMENT CANNULA FOR KIT: Brand: VERTEPORT

## (undated) DEVICE — FRCP BIOP SUPERDIMENSION PREMARK

## (undated) DEVICE — CONTAINER,SPECIMEN,OR STERILE,4OZ: Brand: MEDLINE

## (undated) DEVICE — 3.0MM PRECISION NEURO (MATCH HEAD)

## (undated) DEVICE — SOL IRRIG H2O 1000ML STRL

## (undated) DEVICE — INTENDED FOR TISSUE SEPARATION, AND OTHER PROCEDURES THAT REQUIRE A SHARP SURGICAL BLADE TO PUNCTURE OR CUT.: Brand: BARD-PARKER ® CARBON RIB-BACK BLADES

## (undated) DEVICE — SPNG GZ WOVN 4X4IN 12PLY 10/BX STRL

## (undated) DEVICE — PK PROC TURNOVER